# Patient Record
Sex: MALE | Race: WHITE | NOT HISPANIC OR LATINO | URBAN - METROPOLITAN AREA
[De-identification: names, ages, dates, MRNs, and addresses within clinical notes are randomized per-mention and may not be internally consistent; named-entity substitution may affect disease eponyms.]

---

## 2017-07-14 ENCOUNTER — INPATIENT (INPATIENT)
Facility: HOSPITAL | Age: 82
LOS: 3 days | Discharge: HOME HEALTH | DRG: 208 | End: 2017-07-18
Attending: INTERNAL MEDICINE | Admitting: INTERNAL MEDICINE
Payer: MEDICARE

## 2017-07-14 VITALS
DIASTOLIC BLOOD PRESSURE: 83 MMHG | HEART RATE: 92 BPM | OXYGEN SATURATION: 98 % | RESPIRATION RATE: 24 BRPM | SYSTOLIC BLOOD PRESSURE: 153 MMHG

## 2017-07-14 DIAGNOSIS — Z93.1 GASTROSTOMY STATUS: Chronic | ICD-10-CM

## 2017-07-14 DIAGNOSIS — Z98.89 OTHER SPECIFIED POSTPROCEDURAL STATES: Chronic | ICD-10-CM

## 2017-07-14 LAB — RAPID RVP RESULT: SIGNIFICANT CHANGE UP

## 2017-07-14 PROCEDURE — 99291 CRITICAL CARE FIRST HOUR: CPT | Mod: 25

## 2017-07-14 PROCEDURE — 71010: CPT | Mod: 26

## 2017-07-14 PROCEDURE — 93010 ELECTROCARDIOGRAM REPORT: CPT | Mod: 59

## 2017-07-14 PROCEDURE — 31500 INSERT EMERGENCY AIRWAY: CPT

## 2017-07-14 RX ORDER — SODIUM CHLORIDE 9 MG/ML
1000 INJECTION, SOLUTION INTRAVENOUS
Qty: 0 | Refills: 0 | Status: DISCONTINUED | OUTPATIENT
Start: 2017-07-14 | End: 2017-07-18

## 2017-07-14 RX ORDER — SUCCINYLCHOLINE CHLORIDE 100 MG/5ML
100 SYRINGE (ML) INTRAVENOUS ONCE
Qty: 0 | Refills: 0 | Status: COMPLETED | OUTPATIENT
Start: 2017-07-14 | End: 2017-07-14

## 2017-07-14 RX ORDER — IPRATROPIUM/ALBUTEROL SULFATE 18-103MCG
3 AEROSOL WITH ADAPTER (GRAM) INHALATION EVERY 6 HOURS
Qty: 0 | Refills: 0 | Status: DISCONTINUED | OUTPATIENT
Start: 2017-07-14 | End: 2017-07-18

## 2017-07-14 RX ORDER — SACCHAROMYCES BOULARDII 250 MG
1 POWDER IN PACKET (EA) ORAL
Qty: 0 | Refills: 0 | COMMUNITY

## 2017-07-14 RX ORDER — LEVETIRACETAM 250 MG/1
250 TABLET, FILM COATED ORAL
Qty: 0 | Refills: 0 | Status: DISCONTINUED | OUTPATIENT
Start: 2017-07-14 | End: 2017-07-17

## 2017-07-14 RX ORDER — INSULIN LISPRO 100/ML
VIAL (ML) SUBCUTANEOUS
Qty: 0 | Refills: 0 | Status: DISCONTINUED | OUTPATIENT
Start: 2017-07-14 | End: 2017-07-18

## 2017-07-14 RX ORDER — CEFTRIAXONE 500 MG/1
INJECTION, POWDER, FOR SOLUTION INTRAMUSCULAR; INTRAVENOUS
Qty: 0 | Refills: 0 | Status: DISCONTINUED | OUTPATIENT
Start: 2017-07-14 | End: 2017-07-15

## 2017-07-14 RX ORDER — HEPARIN SODIUM 5000 [USP'U]/ML
5000 INJECTION INTRAVENOUS; SUBCUTANEOUS EVERY 8 HOURS
Qty: 0 | Refills: 0 | Status: DISCONTINUED | OUTPATIENT
Start: 2017-07-14 | End: 2017-07-18

## 2017-07-14 RX ORDER — SODIUM CHLORIDE 9 MG/ML
1000 INJECTION INTRAMUSCULAR; INTRAVENOUS; SUBCUTANEOUS ONCE
Qty: 0 | Refills: 0 | Status: COMPLETED | OUTPATIENT
Start: 2017-07-14 | End: 2017-07-14

## 2017-07-14 RX ORDER — POTASSIUM CHLORIDE 20 MEQ
10 PACKET (EA) ORAL
Qty: 0 | Refills: 0 | Status: COMPLETED | OUTPATIENT
Start: 2017-07-14 | End: 2017-07-15

## 2017-07-14 RX ORDER — HALOPERIDOL DECANOATE 100 MG/ML
2 INJECTION INTRAMUSCULAR
Qty: 0 | Refills: 0 | Status: DISCONTINUED | OUTPATIENT
Start: 2017-07-14 | End: 2017-07-18

## 2017-07-14 RX ORDER — AZITHROMYCIN 500 MG/1
250 TABLET, FILM COATED ORAL DAILY
Qty: 0 | Refills: 0 | Status: DISCONTINUED | OUTPATIENT
Start: 2017-07-15 | End: 2017-07-15

## 2017-07-14 RX ORDER — DEXTROSE 50 % IN WATER 50 %
1 SYRINGE (ML) INTRAVENOUS ONCE
Qty: 0 | Refills: 0 | Status: DISCONTINUED | OUTPATIENT
Start: 2017-07-14 | End: 2017-07-18

## 2017-07-14 RX ORDER — PROPOFOL 10 MG/ML
10 INJECTION, EMULSION INTRAVENOUS
Qty: 1000 | Refills: 0 | Status: DISCONTINUED | OUTPATIENT
Start: 2017-07-14 | End: 2017-07-15

## 2017-07-14 RX ORDER — LEVALBUTEROL 1.25 MG/.5ML
3 SOLUTION, CONCENTRATE RESPIRATORY (INHALATION)
Qty: 0 | Refills: 0 | COMMUNITY

## 2017-07-14 RX ORDER — LEVETIRACETAM 250 MG/1
1 TABLET, FILM COATED ORAL
Qty: 0 | Refills: 0 | COMMUNITY

## 2017-07-14 RX ORDER — GLUCAGON INJECTION, SOLUTION 0.5 MG/.1ML
1 INJECTION, SOLUTION SUBCUTANEOUS ONCE
Qty: 0 | Refills: 0 | Status: DISCONTINUED | OUTPATIENT
Start: 2017-07-14 | End: 2017-07-18

## 2017-07-14 RX ORDER — PANTOPRAZOLE SODIUM 20 MG/1
40 TABLET, DELAYED RELEASE ORAL EVERY 24 HOURS
Qty: 0 | Refills: 0 | Status: DISCONTINUED | OUTPATIENT
Start: 2017-07-14 | End: 2017-07-17

## 2017-07-14 RX ORDER — FUROSEMIDE 40 MG
20 TABLET ORAL
Qty: 0 | Refills: 0 | COMMUNITY

## 2017-07-14 RX ORDER — ETOMIDATE 2 MG/ML
20 INJECTION INTRAVENOUS ONCE
Qty: 0 | Refills: 0 | Status: COMPLETED | OUTPATIENT
Start: 2017-07-14 | End: 2017-07-14

## 2017-07-14 RX ORDER — QUETIAPINE FUMARATE 200 MG/1
50 TABLET, FILM COATED ORAL
Qty: 0 | Refills: 0 | Status: DISCONTINUED | OUTPATIENT
Start: 2017-07-14 | End: 2017-07-15

## 2017-07-14 RX ORDER — DEXTROSE 50 % IN WATER 50 %
12.5 SYRINGE (ML) INTRAVENOUS ONCE
Qty: 0 | Refills: 0 | Status: DISCONTINUED | OUTPATIENT
Start: 2017-07-14 | End: 2017-07-18

## 2017-07-14 RX ORDER — FUROSEMIDE 40 MG
20 TABLET ORAL
Qty: 0 | Refills: 0 | Status: DISCONTINUED | OUTPATIENT
Start: 2017-07-14 | End: 2017-07-15

## 2017-07-14 RX ORDER — SODIUM CHLORIDE 9 MG/ML
1000 INJECTION INTRAMUSCULAR; INTRAVENOUS; SUBCUTANEOUS
Qty: 0 | Refills: 0 | Status: DISCONTINUED | OUTPATIENT
Start: 2017-07-14 | End: 2017-07-17

## 2017-07-14 RX ORDER — HALOPERIDOL DECANOATE 100 MG/ML
1 INJECTION INTRAMUSCULAR
Qty: 0 | Refills: 0 | COMMUNITY

## 2017-07-14 RX ORDER — CEFTRIAXONE 500 MG/1
1 INJECTION, POWDER, FOR SOLUTION INTRAMUSCULAR; INTRAVENOUS ONCE
Qty: 0 | Refills: 0 | Status: COMPLETED | OUTPATIENT
Start: 2017-07-14 | End: 2017-07-14

## 2017-07-14 RX ORDER — CEFTRIAXONE 500 MG/1
1 INJECTION, POWDER, FOR SOLUTION INTRAMUSCULAR; INTRAVENOUS EVERY 24 HOURS
Qty: 0 | Refills: 0 | Status: DISCONTINUED | OUTPATIENT
Start: 2017-07-15 | End: 2017-07-15

## 2017-07-14 RX ORDER — POTASSIUM CHLORIDE 20 MEQ
40 PACKET (EA) ORAL ONCE
Qty: 0 | Refills: 0 | Status: COMPLETED | OUTPATIENT
Start: 2017-07-14 | End: 2017-07-14

## 2017-07-14 RX ORDER — OXCARBAZEPINE 300 MG/1
1 TABLET, FILM COATED ORAL
Qty: 0 | Refills: 0 | COMMUNITY

## 2017-07-14 RX ORDER — IPRATROPIUM BROMIDE 0.2 MG/ML
2.5 SOLUTION, NON-ORAL INHALATION
Qty: 0 | Refills: 0 | COMMUNITY

## 2017-07-14 RX ORDER — GABAPENTIN 400 MG/1
300 CAPSULE ORAL
Qty: 0 | Refills: 0 | COMMUNITY

## 2017-07-14 RX ORDER — AZITHROMYCIN 500 MG/1
500 TABLET, FILM COATED ORAL ONCE
Qty: 0 | Refills: 0 | Status: COMPLETED | OUTPATIENT
Start: 2017-07-14 | End: 2017-07-14

## 2017-07-14 RX ORDER — BUDESONIDE, MICRONIZED 100 %
2 POWDER (GRAM) MISCELLANEOUS
Qty: 0 | Refills: 0 | COMMUNITY

## 2017-07-14 RX ORDER — OXCARBAZEPINE 300 MG/1
300 TABLET, FILM COATED ORAL DAILY
Qty: 0 | Refills: 0 | Status: DISCONTINUED | OUTPATIENT
Start: 2017-07-14 | End: 2017-07-18

## 2017-07-14 RX ORDER — BUDESONIDE, MICRONIZED 100 %
1 POWDER (GRAM) MISCELLANEOUS
Qty: 0 | Refills: 0 | Status: DISCONTINUED | OUTPATIENT
Start: 2017-07-14 | End: 2017-07-15

## 2017-07-14 RX ORDER — OXCARBAZEPINE 300 MG/1
450 TABLET, FILM COATED ORAL AT BEDTIME
Qty: 0 | Refills: 0 | Status: DISCONTINUED | OUTPATIENT
Start: 2017-07-14 | End: 2017-07-18

## 2017-07-14 RX ORDER — GABAPENTIN 400 MG/1
300 CAPSULE ORAL
Qty: 0 | Refills: 0 | Status: DISCONTINUED | OUTPATIENT
Start: 2017-07-14 | End: 2017-07-18

## 2017-07-14 RX ADMIN — SODIUM CHLORIDE 500 MILLILITER(S): 9 INJECTION INTRAMUSCULAR; INTRAVENOUS; SUBCUTANEOUS at 21:07

## 2017-07-14 RX ADMIN — ETOMIDATE 20 MILLIGRAM(S): 2 INJECTION INTRAVENOUS at 19:53

## 2017-07-14 RX ADMIN — PROPOFOL 5.15 MICROGRAM(S)/KG/MIN: 10 INJECTION, EMULSION INTRAVENOUS at 21:23

## 2017-07-14 RX ADMIN — Medication 100 MILLIGRAM(S): at 19:54

## 2017-07-14 NOTE — ED ADULT NURSE REASSESSMENT NOTE - NS ED NURSE REASSESS COMMENT FT1
Pt intubated 1955, 7.5 23 at lip.  Color verified by MD Bettencourt.
20mg etomidate 1953  100mg Succs 1954
Per MD Bettencourt, propofol dose increased to 10mg/kg.  40mg bolus given by MD Bettencourt.  Pt is now calm, resting comfortably.
Propofol 7.5mg /kg IV started at 2000.
Skin integrity intact. 24g IV in right hand prior to arrival to ED.
Vent settings (7.0 ET tube, 22 at the lips): Tidal volume 500, FIO2 80%, PEEP 5, Rate 12. Patient also presents with serna catheter in place (unable to determine Albanian size) and PEG tube. Was given on field by EMS: lasix, nitroglycerin, amikacin, rocephin, solumedrol, and vancomycin.

## 2017-07-14 NOTE — ED PROVIDER NOTE - PHYSICAL EXAMINATION
CON: obtunded, responsive to painful stimuli, HENMT: respiratory distress, clear oropharynx, no vomitus, HEAD: atraumatic, CV: rrr, RESP: dec BS throughout, labored breathing, respiratory distress noted, GI: soft abd, +peg tube noted, no distension, SKIN: no rash, MSK: no edema, moving all extremities spontaneously, NEURO: ams, limited neuro exam

## 2017-07-14 NOTE — ED ADULT NURSE NOTE - PMH
Atherosclerosis of coronary artery  CAD (coronary artery disease)  Cerebral artery occlusion with cerebral infarction  CVA (cerebral vascular accident)  Essential hypertension  HTN (hypertension)  Hyperlipidemia  HLD (hyperlipidemia)  Iron deficiency anemia  Iron deficiency anemia  Legally blind    Prostate hyperplasia with urinary obstruction    Seizure disorder Atherosclerosis of coronary artery  CAD (coronary artery disease)  Cardiomegaly    Cerebral artery occlusion with cerebral infarction  CVA (cerebral vascular accident)  Essential hypertension  HTN (hypertension)  Hyperlipidemia  HLD (hyperlipidemia)  Iron deficiency anemia  Iron deficiency anemia  Legally blind    Prostate hyperplasia with urinary obstruction    Seizure disorder

## 2017-07-14 NOTE — CONSULT NOTE ADULT - SUBJECTIVE AND OBJECTIVE BOX
HPI:     89 year old bedbound M w/ PMH of Alzheimer's dementia, HTN, CAD, CVA w/ left sided deficits, GI bleed, aspiration pneumonitis, epilepsy, w/ PEG, presenting to [facility] ED with respiratory distress and altered mental status. At baseline, patient is AAOx3 and prays everyday. Per family (Stephane Turpin), patient was not feeling well for the last few days with increasing respiratory distress and shortness of breath. He was suctioned at home with increased sputum production. They called their PMD who ordered a chest x-ray which showed bilateral infiltrates. Patient did not desaturate or have fevers. At home, peripheral IV lines were placed and his PMD ordered 1g vancomycin, 2 g ceftriaxone, and 250 mg of amikacin. He was also given solumedrol 125 mg and lasix 80 mg. Upon presentation to the ED, T 97.9, HR 92, /83, RR 24, Sat 98%. Patient was found to have increasing respiratory distress, was not able to protect his airway, had stridor and was intubated for airway protection. Per family, patient has an acute respiratory distress episode every 2 weeks, presumed to be stridor from deviated trachea (per family), and is given atropine and solumedrol with relief of symptoms. In the ED, he was given a 1L bolus and started on propofol.     PMH:   CAD  HTN  Dementia  GI bleed  Blind  Anemia  BPH  Epilepsy    Allergies:  NKDA    Meds:   Trileptal  Haldol  Duoneb  Prednisone  Keppra  Buspar  Gabapentin  Lasix  Seroquel    VITAL SIGNS:  Vital Signs Last 24 Hrs  T(C): 36.8 (14 Jul 2017 22:00), Max: 36.8 (14 Jul 2017 22:00)  T(F): 98.2 (14 Jul 2017 22:00), Max: 98.2 (14 Jul 2017 22:00)  HR: 80 (14 Jul 2017 23:00) (80 - 92)  BP: 134/76 (14 Jul 2017 23:00) (104/76 - 169/94)  BP(mean): 100 (14 Jul 2017 22:00) (100 - 100)  RR: 11 (14 Jul 2017 23:00) (11 - 24)  SpO2: 99% (14 Jul 2017 23:00) (98% - 100%)    PHYSICAL EXAM:    General: intubated, sedated, R hand tremor  HEENT: R eye cloudy, NC/AT  Cardiovascular: +S1/S2; RRR,   Respiratory: CTA b/l; no W/R/R, no stridor appreciated  Gastrointestinal: +PEG, distended,   Extremities: WWP; no edema, clubbing or cyanosis  Neurological: unable to assess    MEDICATIONS:  MEDICATIONS  (STANDING):  propofol Infusion 10 MICROgram(s)/kG/Min (5.148 mL/Hr) IV Continuous <Continuous>  heparin  Injectable 5000 Unit(s) SubCutaneous every 8 hours  insulin lispro (HumaLOG) corrective regimen sliding scale   SubCutaneous Before meals and at bedtime  dextrose 5%. 1000 milliLiter(s) (50 mL/Hr) IV Continuous <Continuous>  dextrose 50% Injectable 12.5 Gram(s) IV Push once  potassium chloride    Tablet ER 40 milliEquivalent(s) Oral once  potassium chloride  10 mEq/100 mL IVPB 10 milliEquivalent(s) IV Intermittent every 1 hour  cefTRIAXone   IVPB   IV Intermittent   azithromycin   Tablet 500 milliGRAM(s) Oral once  sodium chloride 0.9%. 1000 milliLiter(s) (110 mL/Hr) IV Continuous <Continuous>  cefTRIAXone   IVPB 1 Gram(s) IV Intermittent once  OXcarbazepine 300 milliGRAM(s) Oral two times a day  OXcarbazepine 150 milliGRAM(s) Oral at bedtime  gabapentin 300 milliGRAM(s) Oral two times a day  levETIRAcetam 250 milliGRAM(s) Oral two times a day  QUEtiapine 50 milliGRAM(s) Oral two times a day  buDESOnide  180 MICROgram(s) Inhaler 1 Puff(s) Inhalation two times a day  furosemide    Tablet 20 milliGRAM(s) Oral two times a day  pantoprazole  Injectable 40 milliGRAM(s) IV Push every 12 hours    MEDICATIONS  (PRN):  dextrose Gel 1 Dose(s) Oral once PRN Blood Glucose LESS THAN 70 milliGRAM(s)/deciliter  glucagon  Injectable 1 milliGRAM(s) IntraMuscular once PRN Glucose LESS THAN 70 milligrams/deciliter  haloperidol     Tablet 2 milliGRAM(s) Oral two times a day PRN agitation  ALBUTerol/ipratropium for Nebulization 3 milliLiter(s) Nebulizer every 6 hours PRN Shortness of Breath and/or Wheezing      ALLERGIES:  Allergies    No Known Allergies    Intolerances        LABS:                        12.0   5.4   )-----------( 168      ( 14 Jul 2017 19:59 )             36.1     07-14    134<L>  |  90<L>  |  40<H>  ----------------------------<  207<H>  3.3<L>   |  27  |  0.60    Ca    8.7      14 Jul 2017 19:59    TPro  7.6  /  Alb  3.7  /  TBili  0.2  /  DBili  x   /  AST  33  /  ALT  37  /  AlkPhos  125<H>  07-14    PT/INR - ( 14 Jul 2017 20:14 )   PT: 10.6 sec;   INR: 0.96          PTT - ( 14 Jul 2017 20:14 )  PTT:27.9 sec    CAPILLARY BLOOD GLUCOSE          RADIOLOGY & ADDITIONAL TESTS: Reviewed.    ASSESSMENT:    PLAN: HPI:     89 year old bedbound M w/ PMH of Alzheimer's dementia, HTN, CAD, CVA w/ left sided deficits, GI bleed, aspiration pneumonitis, epilepsy, w/ PEG, presenting to [facility] ED with respiratory distress and altered mental status. At baseline, patient is AAOx3 and prays everyday. Per family (Stephane Turpin), patient was not feeling well for the last few days with increasing respiratory distress and shortness of breath. He was suctioned at home with increased sputum production. They called their PMD who ordered a chest x-ray which showed bilateral infiltrates. Patient did not desaturate or have fevers. At home, peripheral IV lines were placed and his PMD ordered 1g vancomycin, 2 g ceftriaxone, and 250 mg of amikacin. He was also given solumedrol 125 mg and lasix 80 mg. Upon presentation to the ED, T 97.9, HR 92, /83, RR 24, Sat 98%. Patient was found to have increasing respiratory distress, was not able to protect his airway, had stridor and was intubated for airway protection. Per family, patient has an acute respiratory distress episode every 2 weeks, presumed to be stridor from deviated trachea (per family), and is given atropine and solumedrol with relief of symptoms. In the ED, he was given a 1L bolus and started on propofol.     PMH:   CAD  HTN  Dementia  GI bleed  Blind  Anemia  BPH  Epilepsy    Allergies:  NKDA    Meds:   Trileptal  Haldol  Duoneb  Prednisone  Keppra  Buspar  Gabapentin  Lasix  Seroquel    VITAL SIGNS:  Vital Signs Last 24 Hrs  T(C): 36.8 (14 Jul 2017 22:00), Max: 36.8 (14 Jul 2017 22:00)  T(F): 98.2 (14 Jul 2017 22:00), Max: 98.2 (14 Jul 2017 22:00)  HR: 80 (14 Jul 2017 23:00) (80 - 92)  BP: 134/76 (14 Jul 2017 23:00) (104/76 - 169/94)  BP(mean): 100 (14 Jul 2017 22:00) (100 - 100)  RR: 11 (14 Jul 2017 23:00) (11 - 24)  SpO2: 99% (14 Jul 2017 23:00) (98% - 100%)    PHYSICAL EXAM:    General: intubated, sedated, R hand tremor  HEENT: R eye cloudy, NC/AT  Cardiovascular: +S1/S2; RRR,   Respiratory: CTA b/l; no W/R/R, no stridor appreciated  Gastrointestinal: +PEG, distended,   Extremities: WWP; no edema, clubbing or cyanosis  Neurological: unable to assess    MEDICATIONS:  MEDICATIONS  (STANDING):  propofol Infusion 10 MICROgram(s)/kG/Min (5.148 mL/Hr) IV Continuous <Continuous>  heparin  Injectable 5000 Unit(s) SubCutaneous every 8 hours  insulin lispro (HumaLOG) corrective regimen sliding scale   SubCutaneous Before meals and at bedtime  dextrose 5%. 1000 milliLiter(s) (50 mL/Hr) IV Continuous <Continuous>  dextrose 50% Injectable 12.5 Gram(s) IV Push once  potassium chloride    Tablet ER 40 milliEquivalent(s) Oral once  potassium chloride  10 mEq/100 mL IVPB 10 milliEquivalent(s) IV Intermittent every 1 hour  cefTRIAXone   IVPB   IV Intermittent   azithromycin   Tablet 500 milliGRAM(s) Oral once  sodium chloride 0.9%. 1000 milliLiter(s) (110 mL/Hr) IV Continuous <Continuous>  cefTRIAXone   IVPB 1 Gram(s) IV Intermittent once  OXcarbazepine 300 milliGRAM(s) Oral two times a day  OXcarbazepine 150 milliGRAM(s) Oral at bedtime  gabapentin 300 milliGRAM(s) Oral two times a day  levETIRAcetam 250 milliGRAM(s) Oral two times a day  QUEtiapine 50 milliGRAM(s) Oral two times a day  buDESOnide  180 MICROgram(s) Inhaler 1 Puff(s) Inhalation two times a day  furosemide    Tablet 20 milliGRAM(s) Oral two times a day  pantoprazole  Injectable 40 milliGRAM(s) IV Push every 12 hours    MEDICATIONS  (PRN):  dextrose Gel 1 Dose(s) Oral once PRN Blood Glucose LESS THAN 70 milliGRAM(s)/deciliter  glucagon  Injectable 1 milliGRAM(s) IntraMuscular once PRN Glucose LESS THAN 70 milligrams/deciliter  haloperidol     Tablet 2 milliGRAM(s) Oral two times a day PRN agitation  ALBUTerol/ipratropium for Nebulization 3 milliLiter(s) Nebulizer every 6 hours PRN Shortness of Breath and/or Wheezing      ALLERGIES:  Allergies    No Known Allergies    Intolerances        LABS:                        12.0   5.4   )-----------( 168      ( 14 Jul 2017 19:59 )             36.1     07-14    134<L>  |  90<L>  |  40<H>  ----------------------------<  207<H>  3.3<L>   |  27  |  0.60    Ca    8.7      14 Jul 2017 19:59    TPro  7.6  /  Alb  3.7  /  TBili  0.2  /  DBili  x   /  AST  33  /  ALT  37  /  AlkPhos  125<H>  07-14    PT/INR - ( 14 Jul 2017 20:14 )   PT: 10.6 sec;   INR: 0.96          PTT - ( 14 Jul 2017 20:14 )  PTT:27.9 sec    CAPILLARY BLOOD GLUCOSE          RADIOLOGY & ADDITIONAL TESTS: Reviewed.    ASSESSMENT: 89 year old bedbound M w/ PMH of Alzheimer's dementia, HTN, CAD, CVA w/ left sided deficits, GI bleed, aspiration pneumonitis, epilepsy, w/ PEG, presenting to [facility] ED with respiratory distress and altered mental status.     #Neuro: currently sedated  -on high doses of AED and anti-psychotics at home: will continue at normal doses  -c/w Keppra, Trileptal, Seroquel and haldol prn  -c/w propofol for sedation    #Pulm: currently intubated and ventilated  -etiology of acute on chronic respiratory failure likely multifactorial: tracheitis vs CAP   -s/p vanc, ceftriaxone, and amikacin at home, no clear infiltrates on chest x-ray, c/w Ceftriaxone and Azithromycin for CAP  -f/u ABG as patient on ventilator  -s/p 125 mg solumedrol at home, will c/w solumedrol 40 mg q12h for tracheitis or airway edema (on prednisone 20 mg at home)    #ID: not septic, was tachypneic and tachycardic however no clear source  -f/u sputum culture, c/w ceftriaxone and azithromycin as above  -f/u blood culture  -lactate 2.2, will not continue to trend    #Cardiology  -with positive troponins in ED at 0.05, likely demand, will get official EKG, trend troponin  -f/u qTC as he is on many AED  -HTN: hold anti-HTN, not requiring pressors    #Endo  -SSI    #FEN  -HSQ for DVT prophylaxis    FULL CODE    Admit to MICU HPI:     89 year old bedbound M w/ PMH of Alzheimer's dementia, HTN, CAD, CVA w/ left sided deficits, GI bleed, aspiration pneumonitis, epilepsy, w/ PEG, presenting to [facility] ED with respiratory distress and altered mental status. At baseline, patient is AAOx3 and prays everyday. Per family (Stephane Turpin), patient was not feeling well for the last few days with increasing respiratory distress and shortness of breath. He was suctioned at home with increased sputum production. They called their PMD who ordered a chest x-ray which showed bilateral infiltrates. Patient did not desaturate or have fevers. At home, peripheral IV lines were placed and his PMD ordered 1g vancomycin, 2 g ceftriaxone, and 250 mg of amikacin. He was also given solumedrol 125 mg and lasix 80 mg. Upon presentation to the ED, T 97.9, HR 92, /83, RR 24, Sat 98%. Patient was found to have increasing respiratory distress, was not able to protect his airway, had stridor and was intubated for airway protection. Per family, patient has an acute respiratory distress episode every 2 weeks, presumed to be stridor from deviated trachea (per family), and is given atropine and solumedrol with relief of symptoms. In the ED, he was given a 1L bolus and started on propofol.     PMH:   CAD  HTN  Dementia  GI bleed  Blind  Anemia  BPH  Epilepsy    Allergies:  NKDA    Meds:   Trileptal  Haldol  Duoneb  Prednisone  Keppra  Buspar  Gabapentin  Lasix  Seroquel    VITAL SIGNS:  Vital Signs Last 24 Hrs  T(C): 36.8 (14 Jul 2017 22:00), Max: 36.8 (14 Jul 2017 22:00)  T(F): 98.2 (14 Jul 2017 22:00), Max: 98.2 (14 Jul 2017 22:00)  HR: 80 (14 Jul 2017 23:00) (80 - 92)  BP: 134/76 (14 Jul 2017 23:00) (104/76 - 169/94)  BP(mean): 100 (14 Jul 2017 22:00) (100 - 100)  RR: 11 (14 Jul 2017 23:00) (11 - 24)  SpO2: 99% (14 Jul 2017 23:00) (98% - 100%)    PHYSICAL EXAM:    General: intubated, sedated, R hand tremor  HEENT: R eye cloudy, NC/AT  Cardiovascular: +S1/S2; RRR,   Respiratory: CTA b/l; no W/R/R, no stridor appreciated  Gastrointestinal: +PEG, distended,   Extremities: WWP; no edema, clubbing or cyanosis  Neurological: unable to assess    MEDICATIONS:  MEDICATIONS  (STANDING):  propofol Infusion 10 MICROgram(s)/kG/Min (5.148 mL/Hr) IV Continuous <Continuous>  heparin  Injectable 5000 Unit(s) SubCutaneous every 8 hours  insulin lispro (HumaLOG) corrective regimen sliding scale   SubCutaneous Before meals and at bedtime  dextrose 5%. 1000 milliLiter(s) (50 mL/Hr) IV Continuous <Continuous>  dextrose 50% Injectable 12.5 Gram(s) IV Push once  potassium chloride    Tablet ER 40 milliEquivalent(s) Oral once  potassium chloride  10 mEq/100 mL IVPB 10 milliEquivalent(s) IV Intermittent every 1 hour  cefTRIAXone   IVPB   IV Intermittent   azithromycin   Tablet 500 milliGRAM(s) Oral once  sodium chloride 0.9%. 1000 milliLiter(s) (110 mL/Hr) IV Continuous <Continuous>  cefTRIAXone   IVPB 1 Gram(s) IV Intermittent once  OXcarbazepine 300 milliGRAM(s) Oral two times a day  OXcarbazepine 150 milliGRAM(s) Oral at bedtime  gabapentin 300 milliGRAM(s) Oral two times a day  levETIRAcetam 250 milliGRAM(s) Oral two times a day  QUEtiapine 50 milliGRAM(s) Oral two times a day  buDESOnide  180 MICROgram(s) Inhaler 1 Puff(s) Inhalation two times a day  furosemide    Tablet 20 milliGRAM(s) Oral two times a day  pantoprazole  Injectable 40 milliGRAM(s) IV Push every 12 hours    MEDICATIONS  (PRN):  dextrose Gel 1 Dose(s) Oral once PRN Blood Glucose LESS THAN 70 milliGRAM(s)/deciliter  glucagon  Injectable 1 milliGRAM(s) IntraMuscular once PRN Glucose LESS THAN 70 milligrams/deciliter  haloperidol     Tablet 2 milliGRAM(s) Oral two times a day PRN agitation  ALBUTerol/ipratropium for Nebulization 3 milliLiter(s) Nebulizer every 6 hours PRN Shortness of Breath and/or Wheezing      ALLERGIES:  Allergies    No Known Allergies    Intolerances        LABS:                        12.0   5.4   )-----------( 168      ( 14 Jul 2017 19:59 )             36.1     07-14    134<L>  |  90<L>  |  40<H>  ----------------------------<  207<H>  3.3<L>   |  27  |  0.60    Ca    8.7      14 Jul 2017 19:59    TPro  7.6  /  Alb  3.7  /  TBili  0.2  /  DBili  x   /  AST  33  /  ALT  37  /  AlkPhos  125<H>  07-14    PT/INR - ( 14 Jul 2017 20:14 )   PT: 10.6 sec;   INR: 0.96          PTT - ( 14 Jul 2017 20:14 )  PTT:27.9 sec    CAPILLARY BLOOD GLUCOSE          RADIOLOGY & ADDITIONAL TESTS: Reviewed.    ASSESSMENT: 89 year old bedbound M w/ PMH of Alzheimer's dementia, HTN, CAD, CVA w/ left sided deficits, GI bleed, aspiration pneumonitis, epilepsy, w/ PEG, presenting to [facility] ED with respiratory distress and altered mental status.     #Neuro: currently sedated  -on high doses of AED and anti-psychotics at home: will continue at normal doses  -c/w Keppra, Trileptal, Seroquel and haldol prn  -c/w propofol for sedation    #Pulm: currently intubated and ventilated  -etiology of acute on chronic respiratory failure likely multifactorial: tracheitis vs CAP   -s/p vanc, ceftriaxone, and amikacin at home, no clear infiltrates on chest x-ray, c/w Ceftriaxone and Azithromycin for CAP  -f/u ABG as patient on ventilator  -s/p 125 mg solumedrol at home, will c/w solumedrol 40 mg q12h for tracheitis or airway edema (on prednisone 20 mg at home)    #ID: not septic, was tachypneic and tachycardic however no clear source  -f/u sputum culture, c/w ceftriaxone and azithromycin as above  -f/u blood culture  -lactate 2.2, will not continue to trend    #Cardiology  -with positive troponins in ED at 0.05, likely demand, will get official EKG, trend troponin  -f/u qTC as he is on many AED  -HTN: hold anti-HTN, not requiring pressors    #Endo  -SSI    #Heme  -anemia: Hgb 12, higher than in past, likely concentrated, hx of MEGHA, will continue to trend  -maintain active type and screen    #FEN  -HSQ for DVT prophylaxis    FULL CODE    Admit to MICU

## 2017-07-14 NOTE — ED ADULT NURSE NOTE - OBJECTIVE STATEMENT
Pt BIBA for SOB, Per EMS current SOB started approx 4 hours ago. Per EMS pt received. 1 gm of Vancomycin, 125mg solumedrol, 80mg Lasix IV and 2mg rocephin prior to EMS arrival.  Pt currently has stridor, and using accessory muscles to breath.  Pt unable to speak in complete, clear sentences. NRB by EMS.

## 2017-07-14 NOTE — ED PROVIDER NOTE - MEDICAL DECISION MAKING DETAILS
respiratory distress, full code (verified w/ family), on NRB upon arrival, concern for potentially rapid deterioration of airway, will need emergent intubation, pt rec'd abx PTA w/ steroids and lasix, will obtain sepsis labs, xray chest, UA, MICU evaluation

## 2017-07-14 NOTE — ED PROVIDER NOTE - DIAGNOSTIC INTERPRETATION
ER Physician: Sebastien Bettencourt  CHEST XRAY INTERPRETATION: RUL opacity noted, heart shadow normal, bony structures intact

## 2017-07-14 NOTE — ED PROVIDER NOTE - PMH
Atherosclerosis of coronary artery  CAD (coronary artery disease)  Cerebral artery occlusion with cerebral infarction  CVA (cerebral vascular accident)  Essential hypertension  HTN (hypertension)  Hyperlipidemia  HLD (hyperlipidemia)  Iron deficiency anemia  Iron deficiency anemia  Legally blind    Prostate hyperplasia with urinary obstruction    Seizure disorder

## 2017-07-14 NOTE — ED ADULT TRIAGE NOTE - CHIEF COMPLAINT QUOTE
pt in resp distress. as per medics pt has confirmed bilateral lobe pneumonia. pt given Solumedrol 125mg, Lasix 80mg, Vanco 1gm, Rocephin 2gm.

## 2017-07-14 NOTE — ED PROVIDER NOTE - OBJECTIVE STATEMENT
89 yom bibems for sob and respiratory distress.  hx of HTN, CAD w/ stents, sz, hx of CVA w/ L deficit, PEG tube, serna for urinary retention, per family, baseline he's engaging and ao x 3, yesterday w/ cough, today 6-7hr ago, sx worsened.  per EMS, pt had xray done at home, w/ b/l PNA, rec'd ceftriaxone, vanco, and amikacin PTA, also 80mg steroids, and 20mg lasix via PMD. 89 yom bibems for sob and respiratory distress.  hx of HTN, CAD w/ stents, sz, hx of CVA w/ L deficit, PEG tube, serna for urinary retention, per family, baseline he's engaging and ao x 3, yesterday w/ cough, today 6-7hr ago, sx worsened.  per EMS, pt had xray done at home, w/ b/l PNA, rec'd ceftriaxone, vanco, and amikacin PTA, also 125mg steroids, and 80mg lasix via PMD.

## 2017-07-15 LAB
ANION GAP SERPL CALC-SCNC: 15 MMOL/L — SIGNIFICANT CHANGE UP (ref 5–17)
APTT BLD: 24.2 SEC — LOW (ref 27.5–37.4)
BASE EXCESS BLDA CALC-SCNC: 7.1 MMOL/L — HIGH (ref -2–3)
BUN SERPL-MCNC: 33 MG/DL — HIGH (ref 7–23)
CALCIUM SERPL-MCNC: 7.7 MG/DL — LOW (ref 8.4–10.5)
CHLORIDE SERPL-SCNC: 97 MMOL/L — SIGNIFICANT CHANGE UP (ref 96–108)
CO2 SERPL-SCNC: 26 MMOL/L — SIGNIFICANT CHANGE UP (ref 22–31)
CREAT SERPL-MCNC: 0.6 MG/DL — SIGNIFICANT CHANGE UP (ref 0.5–1.3)
CULTURE RESULTS: SIGNIFICANT CHANGE UP
GAS PNL BLDA: SIGNIFICANT CHANGE UP
GLUCOSE SERPL-MCNC: 150 MG/DL — HIGH (ref 70–99)
GRAM STN FLD: SIGNIFICANT CHANGE UP
HCO3 BLDA-SCNC: 32 MMOL/L — HIGH (ref 21–28)
HCT VFR BLD CALC: 27.9 % — LOW (ref 39–50)
HGB BLD-MCNC: 9.2 G/DL — LOW (ref 13–17)
INR BLD: 1.09 — SIGNIFICANT CHANGE UP (ref 0.88–1.16)
MAGNESIUM SERPL-MCNC: 2.2 MG/DL — SIGNIFICANT CHANGE UP (ref 1.6–2.6)
MCHC RBC-ENTMCNC: 29.9 PG — SIGNIFICANT CHANGE UP (ref 27–34)
MCHC RBC-ENTMCNC: 33 G/DL — SIGNIFICANT CHANGE UP (ref 32–36)
MCV RBC AUTO: 90.6 FL — SIGNIFICANT CHANGE UP (ref 80–100)
PCO2 BLDA: 45 MMHG — SIGNIFICANT CHANGE UP (ref 35–48)
PH BLDA: 7.46 — HIGH (ref 7.35–7.45)
PHOSPHATE SERPL-MCNC: 2.1 MG/DL — LOW (ref 2.5–4.5)
PLATELET # BLD AUTO: 141 K/UL — LOW (ref 150–400)
PO2 BLDA: 175 MMHG — HIGH (ref 83–108)
POTASSIUM SERPL-MCNC: 3.1 MMOL/L — LOW (ref 3.5–5.3)
POTASSIUM SERPL-SCNC: 3.1 MMOL/L — LOW (ref 3.5–5.3)
PROTHROM AB SERPL-ACNC: 12.1 SEC — SIGNIFICANT CHANGE UP (ref 9.8–12.7)
RBC # BLD: 3.08 M/UL — LOW (ref 4.2–5.8)
RBC # FLD: 14.4 % — SIGNIFICANT CHANGE UP (ref 10.3–16.9)
SAO2 % BLDA: 99 % — SIGNIFICANT CHANGE UP (ref 95–100)
SODIUM SERPL-SCNC: 138 MMOL/L — SIGNIFICANT CHANGE UP (ref 135–145)
SPECIMEN SOURCE: SIGNIFICANT CHANGE UP
TROPONIN T SERPL-MCNC: 0.04 NG/ML — HIGH (ref 0–0.01)
WBC # BLD: 5.6 K/UL — SIGNIFICANT CHANGE UP (ref 3.8–10.5)
WBC # FLD AUTO: 5.6 K/UL — SIGNIFICANT CHANGE UP (ref 3.8–10.5)

## 2017-07-15 PROCEDURE — 99233 SBSQ HOSP IP/OBS HIGH 50: CPT | Mod: GC

## 2017-07-15 PROCEDURE — 71010: CPT | Mod: 26

## 2017-07-15 PROCEDURE — 93010 ELECTROCARDIOGRAM REPORT: CPT

## 2017-07-15 PROCEDURE — 74000: CPT | Mod: 26

## 2017-07-15 RX ORDER — POTASSIUM PHOSPHATE, MONOBASIC POTASSIUM PHOSPHATE, DIBASIC 236; 224 MG/ML; MG/ML
15 INJECTION, SOLUTION INTRAVENOUS ONCE
Qty: 0 | Refills: 0 | Status: COMPLETED | OUTPATIENT
Start: 2017-07-15 | End: 2017-07-15

## 2017-07-15 RX ORDER — QUETIAPINE FUMARATE 200 MG/1
150 TABLET, FILM COATED ORAL THREE TIMES A DAY
Qty: 0 | Refills: 0 | Status: DISCONTINUED | OUTPATIENT
Start: 2017-07-15 | End: 2017-07-15

## 2017-07-15 RX ORDER — SODIUM CHLORIDE 9 MG/ML
1000 INJECTION INTRAMUSCULAR; INTRAVENOUS; SUBCUTANEOUS ONCE
Qty: 0 | Refills: 0 | Status: COMPLETED | OUTPATIENT
Start: 2017-07-15 | End: 2017-07-15

## 2017-07-15 RX ORDER — BUDESONIDE, MICRONIZED 100 %
0.5 POWDER (GRAM) MISCELLANEOUS
Qty: 0 | Refills: 0 | Status: DISCONTINUED | OUTPATIENT
Start: 2017-07-15 | End: 2017-07-18

## 2017-07-15 RX ORDER — QUETIAPINE FUMARATE 200 MG/1
100 TABLET, FILM COATED ORAL ONCE
Qty: 0 | Refills: 0 | Status: COMPLETED | OUTPATIENT
Start: 2017-07-15 | End: 2017-07-15

## 2017-07-15 RX ORDER — QUETIAPINE FUMARATE 200 MG/1
50 TABLET, FILM COATED ORAL
Qty: 0 | Refills: 0 | Status: DISCONTINUED | OUTPATIENT
Start: 2017-07-15 | End: 2017-07-16

## 2017-07-15 RX ORDER — POTASSIUM CHLORIDE 20 MEQ
40 PACKET (EA) ORAL EVERY 4 HOURS
Qty: 0 | Refills: 0 | Status: COMPLETED | OUTPATIENT
Start: 2017-07-15 | End: 2017-07-15

## 2017-07-15 RX ORDER — NOREPINEPHRINE BITARTRATE/D5W 8 MG/250ML
0.01 PLASTIC BAG, INJECTION (ML) INTRAVENOUS
Qty: 8 | Refills: 0 | Status: DISCONTINUED | OUTPATIENT
Start: 2017-07-15 | End: 2017-07-16

## 2017-07-15 RX ORDER — NOREPINEPHRINE BITARTRATE/D5W 8 MG/250ML
0.01 PLASTIC BAG, INJECTION (ML) INTRAVENOUS
Qty: 16 | Refills: 0 | Status: DISCONTINUED | OUTPATIENT
Start: 2017-07-15 | End: 2017-07-15

## 2017-07-15 RX ADMIN — Medication 100 MILLIEQUIVALENT(S): at 01:59

## 2017-07-15 RX ADMIN — PANTOPRAZOLE SODIUM 40 MILLIGRAM(S): 20 TABLET, DELAYED RELEASE ORAL at 22:57

## 2017-07-15 RX ADMIN — Medication 100 MILLIEQUIVALENT(S): at 00:37

## 2017-07-15 RX ADMIN — Medication 40 MILLIGRAM(S): at 21:49

## 2017-07-15 RX ADMIN — POTASSIUM PHOSPHATE, MONOBASIC POTASSIUM PHOSPHATE, DIBASIC 62.5 MILLIMOLE(S): 236; 224 INJECTION, SOLUTION INTRAVENOUS at 06:11

## 2017-07-15 RX ADMIN — QUETIAPINE FUMARATE 150 MILLIGRAM(S): 200 TABLET, FILM COATED ORAL at 17:18

## 2017-07-15 RX ADMIN — HEPARIN SODIUM 5000 UNIT(S): 5000 INJECTION INTRAVENOUS; SUBCUTANEOUS at 21:50

## 2017-07-15 RX ADMIN — OXCARBAZEPINE 300 MILLIGRAM(S): 300 TABLET, FILM COATED ORAL at 11:54

## 2017-07-15 RX ADMIN — Medication 40 MILLIEQUIVALENT(S): at 10:00

## 2017-07-15 RX ADMIN — PROPOFOL 5.15 MICROGRAM(S)/KG/MIN: 10 INJECTION, EMULSION INTRAVENOUS at 00:11

## 2017-07-15 RX ADMIN — GABAPENTIN 300 MILLIGRAM(S): 400 CAPSULE ORAL at 06:10

## 2017-07-15 RX ADMIN — SODIUM CHLORIDE 110 MILLILITER(S): 9 INJECTION INTRAMUSCULAR; INTRAVENOUS; SUBCUTANEOUS at 00:36

## 2017-07-15 RX ADMIN — LEVETIRACETAM 250 MILLIGRAM(S): 250 TABLET, FILM COATED ORAL at 17:17

## 2017-07-15 RX ADMIN — Medication 4: at 00:11

## 2017-07-15 RX ADMIN — QUETIAPINE FUMARATE 50 MILLIGRAM(S): 200 TABLET, FILM COATED ORAL at 06:09

## 2017-07-15 RX ADMIN — HEPARIN SODIUM 5000 UNIT(S): 5000 INJECTION INTRAVENOUS; SUBCUTANEOUS at 14:36

## 2017-07-15 RX ADMIN — GABAPENTIN 300 MILLIGRAM(S): 400 CAPSULE ORAL at 17:18

## 2017-07-15 RX ADMIN — CEFTRIAXONE 100 GRAM(S): 500 INJECTION, POWDER, FOR SOLUTION INTRAMUSCULAR; INTRAVENOUS at 00:11

## 2017-07-15 RX ADMIN — SODIUM CHLORIDE 2000 MILLILITER(S): 9 INJECTION INTRAMUSCULAR; INTRAVENOUS; SUBCUTANEOUS at 20:23

## 2017-07-15 RX ADMIN — LEVETIRACETAM 250 MILLIGRAM(S): 250 TABLET, FILM COATED ORAL at 00:36

## 2017-07-15 RX ADMIN — Medication 40 MILLIEQUIVALENT(S): at 00:11

## 2017-07-15 RX ADMIN — HEPARIN SODIUM 5000 UNIT(S): 5000 INJECTION INTRAVENOUS; SUBCUTANEOUS at 06:10

## 2017-07-15 RX ADMIN — Medication 100 MILLIEQUIVALENT(S): at 00:10

## 2017-07-15 RX ADMIN — QUETIAPINE FUMARATE 100 MILLIGRAM(S): 200 TABLET, FILM COATED ORAL at 11:55

## 2017-07-15 RX ADMIN — Medication 10 MILLIGRAM(S): at 11:55

## 2017-07-15 RX ADMIN — OXCARBAZEPINE 450 MILLIGRAM(S): 300 TABLET, FILM COATED ORAL at 00:36

## 2017-07-15 RX ADMIN — Medication 40 MILLIGRAM(S): at 10:52

## 2017-07-15 RX ADMIN — AZITHROMYCIN 500 MILLIGRAM(S): 500 TABLET, FILM COATED ORAL at 00:11

## 2017-07-15 RX ADMIN — LEVETIRACETAM 250 MILLIGRAM(S): 250 TABLET, FILM COATED ORAL at 06:10

## 2017-07-15 RX ADMIN — PANTOPRAZOLE SODIUM 40 MILLIGRAM(S): 20 TABLET, DELAYED RELEASE ORAL at 00:10

## 2017-07-15 RX ADMIN — OXCARBAZEPINE 450 MILLIGRAM(S): 300 TABLET, FILM COATED ORAL at 21:49

## 2017-07-15 RX ADMIN — Medication 40 MILLIEQUIVALENT(S): at 06:10

## 2017-07-15 NOTE — CONSULT NOTE ADULT - SUBJECTIVE AND OBJECTIVE BOX
89 year old bedbound M w/ multiple co-morbidites presenting to ED with respiratory distress and altered mental status.  As per family, PMD was notified a few days prior and patient had CXR consistent with pneumonia and was started on IV antibiotics w/o improvement. From chart review, patient developed stridor in ED and was unable to tolerate secretions or protect airway and was intubated and transferred to MICU.  As per family patient has history of similar episodes, which occasionally require intubation but often respond well to atropine and steroids.  ENT consulted to be available for extubation and r/o tracheal or laryngeal pathology.  As per MICU team, intubation was not difficult.    PMH:   CAD  HTN  Dementia  GI bleed  Blind  Anemia  BPH  Epilepsy    PE:  Gen: elderly male, minimally responsive, opens eyes spontaneously but does follow commands  Resp: orotracheally intubated w/ 7.0 cuffed tube on mechanical ventilation, minimal vent settings, no leak  Neck: soft and flat, no evidence of prior tracheostomy placement    A/P:  89M w/ multiple co-morbidites including recurrent aspiration pneumonitis and recent PEG requirement, now w/ recurrent episodes of respiratory distress and stridor that respond well to steroids and atropine.  ENT consulted to be present for extubation and to rule out laryngeal or tracheal pathology as cause of respiratory distress, currently being treated for community acquired pneumonia, unable to evaluate for stridor at this time as patient remains intubated.  -agree with continuing steroids  -ENT will be present for extubation once extubation parameters for MICU have been met  -Call ENT with questions  -discussed with Chief resident

## 2017-07-15 NOTE — DIETITIAN INITIAL EVALUATION ADULT. - OTHER INFO
Respiratory  distress / NPO , intubated , no food allergys noted , Skin - intact , page scale 14 + constipated as per staff , Jevity 1.5  via peg feeds taken and kory as per staff

## 2017-07-15 NOTE — H&P ADULT - NSHPPHYSICALEXAM_GEN_ALL_CORE
VITAL SIGNS:  Vital Signs Last 24 Hrs  T(C): 36.8 (14 Jul 2017 22:00), Max: 36.8 (14 Jul 2017 22:00)  T(F): 98.2 (14 Jul 2017 22:00), Max: 98.2 (14 Jul 2017 22:00)  HR: 80 (14 Jul 2017 23:00) (80 - 92)  BP: 134/76 (14 Jul 2017 23:00) (104/76 - 169/94)  BP(mean): 100 (14 Jul 2017 22:00) (100 - 100)  RR: 11 (14 Jul 2017 23:00) (11 - 24)  SpO2: 99% (14 Jul 2017 23:00) (98% - 100%)    PHYSICAL EXAM:    General: intubated, sedated, R hand tremor  HEENT: R eye cloudy, NC/AT  Cardiovascular: +S1/S2; RRR,   Respiratory: CTA b/l; no W/R/R, no stridor appreciated  Gastrointestinal: +PEG, distended,   Extremities: WWP; no edema, clubbing or cyanosis  Neurological: unable to assess

## 2017-07-15 NOTE — H&P ADULT - NSHPLABSRESULTS_GEN_ALL_CORE
LABS:                        12.0   5.4   )-----------( 168      ( 14 Jul 2017 19:59 )             36.1     07-14    134<L>  |  90<L>  |  40<H>  ----------------------------<  207<H>  3.3<L>   |  27  |  0.60    Ca    8.7      14 Jul 2017 19:59    TPro  7.6  /  Alb  3.7  /  TBili  0.2  /  DBili  x   /  AST  33  /  ALT  37  /  AlkPhos  125<H>  07-14    PT/INR - ( 14 Jul 2017 20:14 )   PT: 10.6 sec;   INR: 0.96          PTT - ( 14 Jul 2017 20:14 )  PTT:27.9 sec    CAPILLARY BLOOD GLUCOSE    ASSESSMENT: 89 year old bedbound M w/ PMH of Alzheimer's dementia, HTN, CAD, CVA w/ left sided deficits, GI bleed, aspiration pneumonitis, epilepsy, w/ PEG, presenting to [facility] ED with respiratory distress and altered mental status.     #Neuro: currently sedated  -on high doses of AED and anti-psychotics at home: will continue at normal doses  -c/w Keppra, Trileptal, Seroquel and haldol prn  -c/w propofol for sedation    #Pulm: currently intubated and ventilated  -etiology of acute on chronic respiratory failure likely multifactorial: tracheitis vs CAP   -s/p vanc, ceftriaxone, and amikacin at home, no clear infiltrates on chest x-ray, c/w Ceftriaxone and Azithromycin for CAP  -f/u ABG as patient on ventilator  -s/p 125 mg solumedrol at home, will c/w solumedrol 40 mg q12h for tracheitis or airway edema (on prednisone 20 mg at home)    #ID: not septic, was tachypneic and tachycardic however no clear source  -f/u sputum culture, c/w ceftriaxone and azithromycin as above  -f/u blood culture  -lactate 2.2, will not continue to trend    #Cardiology  -with positive troponins in ED at 0.05, likely demand, will get official EKG, trend troponin  -f/u qTC as he is on many AED  -HTN: hold anti-HTN, not requiring pressors    #Endo  -SSI    #Heme  -anemia: Hgb 12, higher than in past, likely concentrated, hx of MEGHA, will continue to trend  -maintain active type and screen    #FEN  -HSQ for DVT prophylaxis    FULL CODE    Admit to MICU

## 2017-07-15 NOTE — H&P ADULT - ATTENDING COMMENTS
Patient seen and examined with house-staff during bedside rounds.  Resident note read, including vitals, physical findings, laboratory data, and radiological reports.   Revisions included below.  Direct personal management at bed side and extensive interpretation of the data.  Plan was outlined and discussed in details with the housestaff.  Decision making of high complexity  I discussed the case in detail with the family. Patient has respiratory failure secondary to tracheobronchitis, stool impaction/ileus, CAD, HTN.  the patient is not a candidate for weaning. Try her pressure support ventilation was unsuccessful. The patient started to become tachypneic within seconds from being on pressure support support.  abdominal x-ray revealed ileus with fecal impaction associated with dilated bowel loops.  patient will require manual disimpaction followed by laxative. Patient is paced on Rocephin and azithromycin for community-acquired pneumonia. Patient most likely is aspiration with tracheobronchitis. The chest x-ray does not reveal any infiltrate suggestive of pneumonia. Send sputum culture, follow the Gram stain, and adjust antibiotic.  hold tube feeds for now.

## 2017-07-15 NOTE — H&P ADULT - PMH
Atherosclerosis of coronary artery  CAD (coronary artery disease)  Cardiomegaly    Cerebral artery occlusion with cerebral infarction  CVA (cerebral vascular accident)  Essential hypertension  HTN (hypertension)  Hyperlipidemia  HLD (hyperlipidemia)  Iron deficiency anemia  Iron deficiency anemia  Legally blind    Prostate hyperplasia with urinary obstruction    Seizure disorder

## 2017-07-16 LAB
ANION GAP SERPL CALC-SCNC: 14 MMOL/L — SIGNIFICANT CHANGE UP (ref 5–17)
APTT BLD: 27.5 SEC — SIGNIFICANT CHANGE UP (ref 27.5–37.4)
BUN SERPL-MCNC: 26 MG/DL — HIGH (ref 7–23)
CALCIUM SERPL-MCNC: 8.5 MG/DL — SIGNIFICANT CHANGE UP (ref 8.4–10.5)
CHLORIDE SERPL-SCNC: 105 MMOL/L — SIGNIFICANT CHANGE UP (ref 96–108)
CK MB CFR SERPL CALC: 2.7 NG/ML — SIGNIFICANT CHANGE UP (ref 0–6.7)
CK SERPL-CCNC: 78 U/L — SIGNIFICANT CHANGE UP (ref 30–200)
CO2 SERPL-SCNC: 21 MMOL/L — LOW (ref 22–31)
CREAT SERPL-MCNC: 0.6 MG/DL — SIGNIFICANT CHANGE UP (ref 0.5–1.3)
GLUCOSE SERPL-MCNC: 126 MG/DL — HIGH (ref 70–99)
GRAM STN FLD: SIGNIFICANT CHANGE UP
HCT VFR BLD CALC: 32.6 % — LOW (ref 39–50)
HGB BLD-MCNC: 10.9 G/DL — LOW (ref 13–17)
MAGNESIUM SERPL-MCNC: 2.4 MG/DL — SIGNIFICANT CHANGE UP (ref 1.6–2.6)
MCHC RBC-ENTMCNC: 30.6 PG — SIGNIFICANT CHANGE UP (ref 27–34)
MCHC RBC-ENTMCNC: 33.4 G/DL — SIGNIFICANT CHANGE UP (ref 32–36)
MCV RBC AUTO: 91.6 FL — SIGNIFICANT CHANGE UP (ref 80–100)
PLATELET # BLD AUTO: 154 K/UL — SIGNIFICANT CHANGE UP (ref 150–400)
POTASSIUM SERPL-MCNC: 4.1 MMOL/L — SIGNIFICANT CHANGE UP (ref 3.5–5.3)
POTASSIUM SERPL-SCNC: 4.1 MMOL/L — SIGNIFICANT CHANGE UP (ref 3.5–5.3)
PROCALCITONIN SERPL-MCNC: 0.54 NG/ML — HIGH (ref 0–0.04)
RBC # BLD: 3.56 M/UL — LOW (ref 4.2–5.8)
RBC # FLD: 15.3 % — SIGNIFICANT CHANGE UP (ref 10.3–16.9)
SODIUM SERPL-SCNC: 140 MMOL/L — SIGNIFICANT CHANGE UP (ref 135–145)
SPECIMEN SOURCE: SIGNIFICANT CHANGE UP
TROPONIN T SERPL-MCNC: 0.07 NG/ML — CRITICAL HIGH (ref 0–0.01)
WBC # BLD: 5.1 K/UL — SIGNIFICANT CHANGE UP (ref 3.8–10.5)
WBC # FLD AUTO: 5.1 K/UL — SIGNIFICANT CHANGE UP (ref 3.8–10.5)

## 2017-07-16 PROCEDURE — 99233 SBSQ HOSP IP/OBS HIGH 50: CPT | Mod: GC

## 2017-07-16 PROCEDURE — 74000: CPT | Mod: 26

## 2017-07-16 RX ORDER — ACETAMINOPHEN 500 MG
650 TABLET ORAL EVERY 6 HOURS
Qty: 0 | Refills: 0 | Status: DISCONTINUED | OUTPATIENT
Start: 2017-07-16 | End: 2017-07-18

## 2017-07-16 RX ORDER — LACTULOSE 10 G/15ML
10 SOLUTION ORAL
Qty: 0 | Refills: 0 | Status: DISCONTINUED | OUTPATIENT
Start: 2017-07-16 | End: 2017-07-18

## 2017-07-16 RX ORDER — QUETIAPINE FUMARATE 200 MG/1
150 TABLET, FILM COATED ORAL
Qty: 0 | Refills: 0 | Status: DISCONTINUED | OUTPATIENT
Start: 2017-07-16 | End: 2017-07-17

## 2017-07-16 RX ORDER — POLYETHYLENE GLYCOL 3350 17 G/17G
17 POWDER, FOR SOLUTION ORAL
Qty: 0 | Refills: 0 | Status: DISCONTINUED | OUTPATIENT
Start: 2017-07-16 | End: 2017-07-18

## 2017-07-16 RX ORDER — SENNA PLUS 8.6 MG/1
2 TABLET ORAL AT BEDTIME
Qty: 0 | Refills: 0 | Status: DISCONTINUED | OUTPATIENT
Start: 2017-07-16 | End: 2017-07-18

## 2017-07-16 RX ORDER — QUETIAPINE FUMARATE 200 MG/1
100 TABLET, FILM COATED ORAL ONCE
Qty: 0 | Refills: 0 | Status: COMPLETED | OUTPATIENT
Start: 2017-07-16 | End: 2017-07-16

## 2017-07-16 RX ORDER — PROPOFOL 10 MG/ML
5 INJECTION, EMULSION INTRAVENOUS
Qty: 1000 | Refills: 0 | Status: DISCONTINUED | OUTPATIENT
Start: 2017-07-16 | End: 2017-07-16

## 2017-07-16 RX ADMIN — GABAPENTIN 300 MILLIGRAM(S): 400 CAPSULE ORAL at 06:04

## 2017-07-16 RX ADMIN — Medication 650 MILLIGRAM(S): at 06:04

## 2017-07-16 RX ADMIN — LACTULOSE 10 GRAM(S): 10 SOLUTION ORAL at 10:40

## 2017-07-16 RX ADMIN — HEPARIN SODIUM 5000 UNIT(S): 5000 INJECTION INTRAVENOUS; SUBCUTANEOUS at 21:07

## 2017-07-16 RX ADMIN — LACTULOSE 10 GRAM(S): 10 SOLUTION ORAL at 17:28

## 2017-07-16 RX ADMIN — LEVETIRACETAM 250 MILLIGRAM(S): 250 TABLET, FILM COATED ORAL at 06:05

## 2017-07-16 RX ADMIN — PANTOPRAZOLE SODIUM 40 MILLIGRAM(S): 20 TABLET, DELAYED RELEASE ORAL at 22:39

## 2017-07-16 RX ADMIN — LEVETIRACETAM 250 MILLIGRAM(S): 250 TABLET, FILM COATED ORAL at 17:28

## 2017-07-16 RX ADMIN — Medication 3 MILLILITER(S): at 16:38

## 2017-07-16 RX ADMIN — HALOPERIDOL DECANOATE 2 MILLIGRAM(S): 100 INJECTION INTRAMUSCULAR at 14:27

## 2017-07-16 RX ADMIN — HEPARIN SODIUM 5000 UNIT(S): 5000 INJECTION INTRAVENOUS; SUBCUTANEOUS at 06:04

## 2017-07-16 RX ADMIN — Medication 650 MILLIGRAM(S): at 14:27

## 2017-07-16 RX ADMIN — Medication 0.5 MILLIGRAM(S): at 05:41

## 2017-07-16 RX ADMIN — Medication 40 MILLIGRAM(S): at 09:23

## 2017-07-16 RX ADMIN — Medication 650 MILLIGRAM(S): at 15:00

## 2017-07-16 RX ADMIN — OXCARBAZEPINE 300 MILLIGRAM(S): 300 TABLET, FILM COATED ORAL at 10:40

## 2017-07-16 RX ADMIN — Medication 10 MILLIGRAM(S): at 10:40

## 2017-07-16 RX ADMIN — Medication 0.5 MILLIGRAM(S): at 16:38

## 2017-07-16 RX ADMIN — POLYETHYLENE GLYCOL 3350 17 GRAM(S): 17 POWDER, FOR SOLUTION ORAL at 17:28

## 2017-07-16 RX ADMIN — OXCARBAZEPINE 450 MILLIGRAM(S): 300 TABLET, FILM COATED ORAL at 21:08

## 2017-07-16 RX ADMIN — SENNA PLUS 2 TABLET(S): 8.6 TABLET ORAL at 21:07

## 2017-07-16 RX ADMIN — GABAPENTIN 300 MILLIGRAM(S): 400 CAPSULE ORAL at 17:28

## 2017-07-16 RX ADMIN — Medication 650 MILLIGRAM(S): at 08:59

## 2017-07-16 RX ADMIN — QUETIAPINE FUMARATE 150 MILLIGRAM(S): 200 TABLET, FILM COATED ORAL at 21:13

## 2017-07-16 RX ADMIN — SODIUM CHLORIDE 110 MILLILITER(S): 9 INJECTION INTRAMUSCULAR; INTRAVENOUS; SUBCUTANEOUS at 22:39

## 2017-07-16 RX ADMIN — QUETIAPINE FUMARATE 100 MILLIGRAM(S): 200 TABLET, FILM COATED ORAL at 10:41

## 2017-07-16 RX ADMIN — HEPARIN SODIUM 5000 UNIT(S): 5000 INJECTION INTRAVENOUS; SUBCUTANEOUS at 14:27

## 2017-07-16 RX ADMIN — SODIUM CHLORIDE 110 MILLILITER(S): 9 INJECTION INTRAMUSCULAR; INTRAVENOUS; SUBCUTANEOUS at 13:25

## 2017-07-16 RX ADMIN — QUETIAPINE FUMARATE 50 MILLIGRAM(S): 200 TABLET, FILM COATED ORAL at 06:05

## 2017-07-16 NOTE — PROGRESS NOTE ADULT - ATTENDING COMMENTS
Patient seen and examined with house-staff during bedside rounds.  Resident note read, including vitals, physical findings, laboratory data, and radiological reports.   Revisions included below.  Direct personal management at bed side and extensive interpretation of the data.  Plan was outlined and discussed in details with the housestaff.  Decision making of high complexity  respiratory failure aspiration pneumonia coronary artery disease patient is clinically stable. Patient was extubated with ENT at the bedside. Postextubation pulmonary status is adequate. Increase Seroquel to decrease agitation.  continue antibiotic. Continue to feed. I discussed the case in details with the family

## 2017-07-16 NOTE — PROGRESS NOTE ADULT - SUBJECTIVE AND OBJECTIVE BOX
INTERVAL HPI/OVERNIGHT EVENTS:    SUBJECTIVE: Patient seen and examined at bedside.    OBJECTIVE:    VITAL SIGNS:  ICU Vital Signs Last 24 Hrs  T(C): 36.5 (16 Jul 2017 10:45), Max: 37.4 (15 Jul 2017 15:15)  T(F): 97.7 (16 Jul 2017 10:45), Max: 99.3 (15 Jul 2017 15:15)  HR: 64 (16 Jul 2017 13:00) (42 - 84)  BP: 162/81 (16 Jul 2017 13:00) (81/61 - 183/86)  BP(mean): 116 (16 Jul 2017 13:00) (58 - 129)  ABP: --  ABP(mean): --  RR: 25 (16 Jul 2017 13:00) (11 - 41)  SpO2: 99% (16 Jul 2017 13:00) (96% - 100%)    Mode: CPAP with PS, FiO2: 40, PEEP: 5, PS: 10, MAP: 8, PIP: 16    07-15 @ 07:01  -  07-16 @ 07:00  --------------------------------------------------------  IN: 2680.2 mL / OUT: 726 mL / NET: 1954.2 mL    07-16 @ 07:01  -  07-16 @ 13:51  --------------------------------------------------------  IN: 860 mL / OUT: 240 mL / NET: 620 mL      CAPILLARY BLOOD GLUCOSE  150 (15 Jul 2017 16:51)          PHYSICAL EXAM:    General: NAD  HEENT: NC/AT; PERRL, clear conjunctiva  Neck: supple  Respiratory: CTA b/l  Cardiovascular: +S1/S2; RRR  Abdomen: soft, NT/ND; +BS x4  Extremities: WWP, 2+ peripheral pulses b/l; no LE edema  Skin: normal color and turgor; no rash  Neurological:     MEDICATIONS:  MEDICATIONS  (STANDING):  heparin  Injectable 5000 Unit(s) SubCutaneous every 8 hours  insulin lispro (HumaLOG) corrective regimen sliding scale   SubCutaneous Before meals and at bedtime  dextrose 5%. 1000 milliLiter(s) (50 mL/Hr) IV Continuous <Continuous>  dextrose 50% Injectable 12.5 Gram(s) IV Push once  sodium chloride 0.9%. 1000 milliLiter(s) (110 mL/Hr) IV Continuous <Continuous>  OXcarbazepine 300 milliGRAM(s) Oral daily  OXcarbazepine 450 milliGRAM(s) Oral at bedtime  gabapentin 300 milliGRAM(s) Oral two times a day  levETIRAcetam 250 milliGRAM(s) Oral two times a day  busPIRone 10 milliGRAM(s) Oral daily  pantoprazole  Injectable 40 milliGRAM(s) IV Push every 24 hours  buDESOnide   0.5 milliGRAM(s) Respule 0.5 milliGRAM(s) Nebulizer two times a day  senna 2 Tablet(s) Oral at bedtime  polyethylene glycol 3350 17 Gram(s) Oral two times a day  lactulose Syrup 10 Gram(s) Oral two times a day  QUEtiapine 150 milliGRAM(s) Oral two times a day    MEDICATIONS  (PRN):  dextrose Gel 1 Dose(s) Oral once PRN Blood Glucose LESS THAN 70 milliGRAM(s)/deciliter  glucagon  Injectable 1 milliGRAM(s) IntraMuscular once PRN Glucose LESS THAN 70 milligrams/deciliter  haloperidol     Tablet 2 milliGRAM(s) Oral two times a day PRN agitation  ALBUTerol/ipratropium for Nebulization 3 milliLiter(s) Nebulizer every 6 hours PRN Shortness of Breath and/or Wheezing  acetaminophen   Tablet. 650 milliGRAM(s) Oral every 6 hours PRN Moderate Pain (4 - 6)      ALLERGIES:  Allergies    No Known Allergies    Intolerances        LABS:                        10.9   5.1   )-----------( 154      ( 16 Jul 2017 05:00 )             32.6     07-16    140  |  105  |  26<H>  ----------------------------<  126<H>  4.1   |  21<L>  |  0.60    Ca    8.5      16 Jul 2017 05:02  Phos  2.1     07-15  Mg     2.4     07-16    TPro  7.6  /  Alb  3.7  /  TBili  0.2  /  DBili  x   /  AST  33  /  ALT  37  /  AlkPhos  125<H>  07-14    PT/INR - ( 15 Jul 2017 03:09 )   PT: 12.1 sec;   INR: 1.09          PTT - ( 16 Jul 2017 05:00 )  PTT:27.5 sec      RADIOLOGY & ADDITIONAL TESTS: Reviewed. INTERVAL HPI/OVERNIGHT EVENTS:    SUBJECTIVE: Patient seen and examined at bedside.    OBJECTIVE:    VITAL SIGNS:  ICU Vital Signs Last 24 Hrs  T(C): 36.5 (16 Jul 2017 10:45), Max: 37.4 (15 Jul 2017 15:15)  T(F): 97.7 (16 Jul 2017 10:45), Max: 99.3 (15 Jul 2017 15:15)  HR: 64 (16 Jul 2017 13:00) (42 - 84)  BP: 162/81 (16 Jul 2017 13:00) (81/61 - 183/86)  BP(mean): 116 (16 Jul 2017 13:00) (58 - 129)  ABP: --  ABP(mean): --  RR: 25 (16 Jul 2017 13:00) (11 - 41)  SpO2: 99% (16 Jul 2017 13:00) (96% - 100%)    Mode: CPAP with PS, FiO2: 40, PEEP: 5, PS: 10, MAP: 8, PIP: 16    07-15 @ 07:01  -  07-16 @ 07:00  --------------------------------------------------------  IN: 2680.2 mL / OUT: 726 mL / NET: 1954.2 mL    07-16 @ 07:01  -  07-16 @ 13:51  --------------------------------------------------------  IN: 860 mL / OUT: 240 mL / NET: 620 mL      CAPILLARY BLOOD GLUCOSE  150 (15 Jul 2017 16:51)          PHYSICAL EXAM:    General: NAD  HEENT: NC/AT; cloudy R eye  Neck: supple  Respiratory: CTA b/l  Cardiovascular: +S1/S2; RRR  Abdomen: soft, nontender +Distended abdomen; +PEG; +BS  Extremities: WWP, 2+ peripheral pulses b/l; no LE edema  Skin: normal color and turgor; no rash    MEDICATIONS:  MEDICATIONS  (STANDING):  heparin  Injectable 5000 Unit(s) SubCutaneous every 8 hours  insulin lispro (HumaLOG) corrective regimen sliding scale   SubCutaneous Before meals and at bedtime  dextrose 5%. 1000 milliLiter(s) (50 mL/Hr) IV Continuous <Continuous>  dextrose 50% Injectable 12.5 Gram(s) IV Push once  sodium chloride 0.9%. 1000 milliLiter(s) (110 mL/Hr) IV Continuous <Continuous>  OXcarbazepine 300 milliGRAM(s) Oral daily  OXcarbazepine 450 milliGRAM(s) Oral at bedtime  gabapentin 300 milliGRAM(s) Oral two times a day  levETIRAcetam 250 milliGRAM(s) Oral two times a day  busPIRone 10 milliGRAM(s) Oral daily  pantoprazole  Injectable 40 milliGRAM(s) IV Push every 24 hours  buDESOnide   0.5 milliGRAM(s) Respule 0.5 milliGRAM(s) Nebulizer two times a day  senna 2 Tablet(s) Oral at bedtime  polyethylene glycol 3350 17 Gram(s) Oral two times a day  lactulose Syrup 10 Gram(s) Oral two times a day  QUEtiapine 150 milliGRAM(s) Oral two times a day    MEDICATIONS  (PRN):  dextrose Gel 1 Dose(s) Oral once PRN Blood Glucose LESS THAN 70 milliGRAM(s)/deciliter  glucagon  Injectable 1 milliGRAM(s) IntraMuscular once PRN Glucose LESS THAN 70 milligrams/deciliter  haloperidol     Tablet 2 milliGRAM(s) Oral two times a day PRN agitation  ALBUTerol/ipratropium for Nebulization 3 milliLiter(s) Nebulizer every 6 hours PRN Shortness of Breath and/or Wheezing  acetaminophen   Tablet. 650 milliGRAM(s) Oral every 6 hours PRN Moderate Pain (4 - 6)      ALLERGIES:  Allergies    No Known Allergies    Intolerances        LABS:                        10.9   5.1   )-----------( 154      ( 16 Jul 2017 05:00 )             32.6     07-16    140  |  105  |  26<H>  ----------------------------<  126<H>  4.1   |  21<L>  |  0.60    Ca    8.5      16 Jul 2017 05:02  Phos  2.1     07-15  Mg     2.4     07-16    TPro  7.6  /  Alb  3.7  /  TBili  0.2  /  DBili  x   /  AST  33  /  ALT  37  /  AlkPhos  125<H>  07-14    PT/INR - ( 15 Jul 2017 03:09 )   PT: 12.1 sec;   INR: 1.09          PTT - ( 16 Jul 2017 05:00 )  PTT:27.5 sec      RADIOLOGY & ADDITIONAL TESTS: Reviewed.

## 2017-07-16 NOTE — PROGRESS NOTE ADULT - ASSESSMENT
89 yoM w/ PMH of Alzheimer's dementia, HTN, CAD, CVA w/ left sided deficits, GI bleed, aspiration pneumonitis, epilepsy, w/ PEG, presenting to [facility] ED with respiratory distress and altered mental status.     #Neuro: awake and alert  -on high doses of AED and anti-psychotics at home: will continue at normal doses  -c/w Keppra, Trileptal, Seroquel and haldol prn  - off propofol   - s/p extubation with ENT, no stridor  - haldol 2mg PO BID prn for agitation    #Pulm: s/p extubation  -etiology of acute on chronic respiratory failure likely multifactorial: tracheitis vs CAP   -s/p vanc, ceftriaxone, and amikacin at home, no clear infiltrates on chest x-ray, DC'd ceftriaxone and azithromycin  -DC'd solumedrol     #ID: not septic, was tachypneic and tachycardic however no clear source  -f/u sputum culture (7/14) - rejected due to contamination  -f/u blood culture (7/14) - NG at 1 day  -lactate 2.2, will not continue to trend    #Cardiology  -with positive troponins 0.07, likely demand, will not repeat  - , wnl  -HTN: hold anti-HTN, not requiring pressors    #GI  - distended bowel loops on Xray abdomen  - 1 large BM s/p enema  - PEG on suction  - rpt Xray abdomen (7/16) showing retained fecal material  - on senna, colace, lactulose      #Endo  -SSI    #Heme  -anemia: Hgb 10.9, stable at baseline  -monitor H/H, continue to trend  -maintain active type and screen    #FEN  -NS  - HSQ for DVT prophylaxis    FULL CODE    Admit to MICU

## 2017-07-16 NOTE — CONSULT NOTE ADULT - SUBJECTIVE AND OBJECTIVE BOX
Patient had positive leak test and met all MICU extubation criteria.  ENT present at bedside for extubation.     PE:  Gen: arousable, repsonsive, inconsistently following commands  Resp: +leak test, cuff deflated, ET tube removed  No stridor s/p extubation, satting 100% on face tent w/ humidified air  no tachypnea or retractions  NPL: np/op/hp clear, piriforms open- no pooling of secretions, TVC mobile bilaterally    A/P:  89M w/ respiratory distress and stridor requiring intubation, now s/p extubation, doing well, no obvious pathology on exam, no stridor s/p extubation  -recommend continued steroids x2 doses  -pulmonary toilet  -reconsult ENT for further evaluation if stridor develops   -Call ENT with questions  -ENT chief resident present throughout evaluation, agrees with plan above

## 2017-07-17 LAB
ALBUMIN SERPL ELPH-MCNC: 3.3 G/DL — SIGNIFICANT CHANGE UP (ref 3.3–5)
ALP SERPL-CCNC: 83 U/L — SIGNIFICANT CHANGE UP (ref 40–120)
ALT FLD-CCNC: 29 U/L — SIGNIFICANT CHANGE UP (ref 10–45)
ANION GAP SERPL CALC-SCNC: 12 MMOL/L — SIGNIFICANT CHANGE UP (ref 5–17)
AST SERPL-CCNC: 22 U/L — SIGNIFICANT CHANGE UP (ref 10–40)
BASE EXCESS BLDA CALC-SCNC: -2.1 MMOL/L — LOW (ref -2–3)
BILIRUB SERPL-MCNC: 0.3 MG/DL — SIGNIFICANT CHANGE UP (ref 0.2–1.2)
BUN SERPL-MCNC: 17 MG/DL — SIGNIFICANT CHANGE UP (ref 7–23)
CALCIUM SERPL-MCNC: 8.4 MG/DL — SIGNIFICANT CHANGE UP (ref 8.4–10.5)
CHLORIDE SERPL-SCNC: 108 MMOL/L — SIGNIFICANT CHANGE UP (ref 96–108)
CO2 SERPL-SCNC: 24 MMOL/L — SIGNIFICANT CHANGE UP (ref 22–31)
CREAT SERPL-MCNC: 0.5 MG/DL — SIGNIFICANT CHANGE UP (ref 0.5–1.3)
GAS PNL BLDA: SIGNIFICANT CHANGE UP
GLUCOSE SERPL-MCNC: 77 MG/DL — SIGNIFICANT CHANGE UP (ref 70–99)
HCO3 BLDA-SCNC: 24 MMOL/L — SIGNIFICANT CHANGE UP (ref 21–28)
HCT VFR BLD CALC: 32.1 % — LOW (ref 39–50)
HGB BLD-MCNC: 10.4 G/DL — LOW (ref 13–17)
MAGNESIUM SERPL-MCNC: 2.3 MG/DL — SIGNIFICANT CHANGE UP (ref 1.6–2.6)
MCHC RBC-ENTMCNC: 30.2 PG — SIGNIFICANT CHANGE UP (ref 27–34)
MCHC RBC-ENTMCNC: 32.4 G/DL — SIGNIFICANT CHANGE UP (ref 32–36)
MCV RBC AUTO: 93.3 FL — SIGNIFICANT CHANGE UP (ref 80–100)
PCO2 BLDA: 47 MMHG — SIGNIFICANT CHANGE UP (ref 35–48)
PH BLDA: 7.33 — LOW (ref 7.35–7.45)
PHOSPHATE SERPL-MCNC: 2.9 MG/DL — SIGNIFICANT CHANGE UP (ref 2.5–4.5)
PLATELET # BLD AUTO: 128 K/UL — LOW (ref 150–400)
PO2 BLDA: 187 MMHG — HIGH (ref 83–108)
POTASSIUM SERPL-MCNC: 4.3 MMOL/L — SIGNIFICANT CHANGE UP (ref 3.5–5.3)
POTASSIUM SERPL-SCNC: 4.3 MMOL/L — SIGNIFICANT CHANGE UP (ref 3.5–5.3)
PROT SERPL-MCNC: 6.3 G/DL — SIGNIFICANT CHANGE UP (ref 6–8.3)
RBC # BLD: 3.44 M/UL — LOW (ref 4.2–5.8)
RBC # FLD: 15.2 % — SIGNIFICANT CHANGE UP (ref 10.3–16.9)
SAO2 % BLDA: 100 % — SIGNIFICANT CHANGE UP (ref 95–100)
SODIUM SERPL-SCNC: 144 MMOL/L — SIGNIFICANT CHANGE UP (ref 135–145)
WBC # BLD: 6.1 K/UL — SIGNIFICANT CHANGE UP (ref 3.8–10.5)
WBC # FLD AUTO: 6.1 K/UL — SIGNIFICANT CHANGE UP (ref 3.8–10.5)

## 2017-07-17 PROCEDURE — 99233 SBSQ HOSP IP/OBS HIGH 50: CPT | Mod: GC

## 2017-07-17 PROCEDURE — 71010: CPT | Mod: 26

## 2017-07-17 RX ORDER — SODIUM CHLORIDE 9 MG/ML
1000 INJECTION, SOLUTION INTRAVENOUS
Qty: 0 | Refills: 0 | Status: DISCONTINUED | OUTPATIENT
Start: 2017-07-17 | End: 2017-07-18

## 2017-07-17 RX ORDER — LEVETIRACETAM 250 MG/1
250 TABLET, FILM COATED ORAL
Qty: 0 | Refills: 0 | Status: DISCONTINUED | OUTPATIENT
Start: 2017-07-17 | End: 2017-07-18

## 2017-07-17 RX ORDER — QUETIAPINE FUMARATE 200 MG/1
100 TABLET, FILM COATED ORAL EVERY 6 HOURS
Qty: 0 | Refills: 0 | Status: DISCONTINUED | OUTPATIENT
Start: 2017-07-17 | End: 2017-07-17

## 2017-07-17 RX ORDER — HALOPERIDOL DECANOATE 100 MG/ML
2 INJECTION INTRAMUSCULAR ONCE
Qty: 0 | Refills: 0 | Status: COMPLETED | OUTPATIENT
Start: 2017-07-17 | End: 2017-07-17

## 2017-07-17 RX ORDER — QUETIAPINE FUMARATE 200 MG/1
150 TABLET, FILM COATED ORAL EVERY 8 HOURS
Qty: 0 | Refills: 0 | Status: DISCONTINUED | OUTPATIENT
Start: 2017-07-17 | End: 2017-07-17

## 2017-07-17 RX ORDER — QUETIAPINE FUMARATE 200 MG/1
150 TABLET, FILM COATED ORAL EVERY 8 HOURS
Qty: 0 | Refills: 0 | Status: DISCONTINUED | OUTPATIENT
Start: 2017-07-17 | End: 2017-07-18

## 2017-07-17 RX ORDER — LOSARTAN POTASSIUM 100 MG/1
50 TABLET, FILM COATED ORAL DAILY
Qty: 0 | Refills: 0 | Status: DISCONTINUED | OUTPATIENT
Start: 2017-07-17 | End: 2017-07-18

## 2017-07-17 RX ORDER — PANTOPRAZOLE SODIUM 20 MG/1
40 TABLET, DELAYED RELEASE ORAL
Qty: 0 | Refills: 0 | Status: DISCONTINUED | OUTPATIENT
Start: 2017-07-17 | End: 2017-07-18

## 2017-07-17 RX ADMIN — LEVETIRACETAM 250 MILLIGRAM(S): 250 TABLET, FILM COATED ORAL at 07:09

## 2017-07-17 RX ADMIN — Medication 0.5 MILLIGRAM(S): at 06:16

## 2017-07-17 RX ADMIN — QUETIAPINE FUMARATE 150 MILLIGRAM(S): 200 TABLET, FILM COATED ORAL at 07:10

## 2017-07-17 RX ADMIN — HEPARIN SODIUM 5000 UNIT(S): 5000 INJECTION INTRAVENOUS; SUBCUTANEOUS at 14:44

## 2017-07-17 RX ADMIN — LACTULOSE 10 GRAM(S): 10 SOLUTION ORAL at 17:27

## 2017-07-17 RX ADMIN — POLYETHYLENE GLYCOL 3350 17 GRAM(S): 17 POWDER, FOR SOLUTION ORAL at 07:11

## 2017-07-17 RX ADMIN — HEPARIN SODIUM 5000 UNIT(S): 5000 INJECTION INTRAVENOUS; SUBCUTANEOUS at 07:10

## 2017-07-17 RX ADMIN — LEVETIRACETAM 250 MILLIGRAM(S): 250 TABLET, FILM COATED ORAL at 17:28

## 2017-07-17 RX ADMIN — Medication 0.5 MILLIGRAM(S): at 18:50

## 2017-07-17 RX ADMIN — HALOPERIDOL DECANOATE 2 MILLIGRAM(S): 100 INJECTION INTRAMUSCULAR at 14:45

## 2017-07-17 RX ADMIN — SODIUM CHLORIDE 80 MILLILITER(S): 9 INJECTION, SOLUTION INTRAVENOUS at 19:50

## 2017-07-17 RX ADMIN — QUETIAPINE FUMARATE 150 MILLIGRAM(S): 200 TABLET, FILM COATED ORAL at 21:23

## 2017-07-17 RX ADMIN — LACTULOSE 10 GRAM(S): 10 SOLUTION ORAL at 07:10

## 2017-07-17 RX ADMIN — SENNA PLUS 2 TABLET(S): 8.6 TABLET ORAL at 21:22

## 2017-07-17 RX ADMIN — HALOPERIDOL DECANOATE 2 MILLIGRAM(S): 100 INJECTION INTRAMUSCULAR at 01:41

## 2017-07-17 RX ADMIN — Medication 2: at 22:26

## 2017-07-17 RX ADMIN — HEPARIN SODIUM 5000 UNIT(S): 5000 INJECTION INTRAVENOUS; SUBCUTANEOUS at 21:22

## 2017-07-17 RX ADMIN — QUETIAPINE FUMARATE 100 MILLIGRAM(S): 200 TABLET, FILM COATED ORAL at 15:08

## 2017-07-17 RX ADMIN — HALOPERIDOL DECANOATE 2 MILLIGRAM(S): 100 INJECTION INTRAMUSCULAR at 07:11

## 2017-07-17 RX ADMIN — Medication 650 MILLIGRAM(S): at 01:41

## 2017-07-17 RX ADMIN — Medication 10 MILLIGRAM(S): at 12:16

## 2017-07-17 RX ADMIN — OXCARBAZEPINE 300 MILLIGRAM(S): 300 TABLET, FILM COATED ORAL at 12:16

## 2017-07-17 RX ADMIN — Medication 650 MILLIGRAM(S): at 02:36

## 2017-07-17 RX ADMIN — GABAPENTIN 300 MILLIGRAM(S): 400 CAPSULE ORAL at 07:09

## 2017-07-17 RX ADMIN — GABAPENTIN 300 MILLIGRAM(S): 400 CAPSULE ORAL at 17:27

## 2017-07-17 RX ADMIN — OXCARBAZEPINE 450 MILLIGRAM(S): 300 TABLET, FILM COATED ORAL at 21:30

## 2017-07-17 RX ADMIN — POLYETHYLENE GLYCOL 3350 17 GRAM(S): 17 POWDER, FOR SOLUTION ORAL at 17:29

## 2017-07-17 RX ADMIN — Medication 20 MILLIGRAM(S): at 14:45

## 2017-07-17 RX ADMIN — SODIUM CHLORIDE 80 MILLILITER(S): 9 INJECTION, SOLUTION INTRAVENOUS at 06:37

## 2017-07-17 RX ADMIN — PANTOPRAZOLE SODIUM 40 MILLIGRAM(S): 20 TABLET, DELAYED RELEASE ORAL at 17:29

## 2017-07-17 NOTE — PROGRESS NOTE ADULT - ASSESSMENT
89 yoM w/ PMH of Alzheimer's dementia, HTN, CAD, CVA w/ left sided deficits, GI bleed, aspiration pneumonitis, epilepsy, w/ PEG, presenting to [facility] ED with respiratory distress and altered mental status.     #Neuro: awake and alert  -on high doses of AED and anti-psychotics at home: will continue at normal doses  -c/w Keppra, Trileptal, Seroquel and haldol prn  - off propofol   - s/p extubation with ENT, no stridor  - haldol 2mg PO BID prn for agitation    #Pulm: s/p extubation  -etiology of acute on chronic respiratory failure likely multifactorial: tracheitis vs CAP   -s/p vanc, ceftriaxone, and amikacin at home, no clear infiltrates on chest x-ray, DC'd ceftriaxone and azithromycin  -DC'd solumedrol     #ID: not septic, was tachypneic and tachycardic however no clear source  -f/u sputum culture (7/14) - rejected due to contamination  -f/u blood culture (7/14) - NG at 1 day  -lactate 2.2, will not continue to trend    #Cardiology  -with positive troponins 0.07, likely demand, will not repeat  - , wnl  -HTN: hold anti-HTN, not requiring pressors    #GI  - distended bowel loops on Xray abdomen  - 1 large BM s/p enema  - PEG on suction  - rpt Xray abdomen (7/16) showing retained fecal material  - on senna, colace, lactulose      #Endo  -SSI    #Heme  -anemia: Hgb 10.9, stable at baseline  -monitor H/H, continue to trend  -maintain active type and screen    #FEN  -NS  - HSQ for DVT prophylaxis 89 yoM w/ PMH of Alzheimer's dementia, HTN, CAD, CVA w/ left sided deficits, GI bleed, aspiration pneumonitis, epilepsy, w/ PEG, presenting to [facility] ED with respiratory distress and altered mental status.     #Neuro: awake and alert  -on high doses of AED and anti-psychotics at home: will continue at normal doses  -c/w Keppra, Trileptal, Seroquel and haldol prn  - off propofol   - s/p extubation with ENT, no stridor  - haldol 2mg PO BID prn for agitation    #Pulm: s/p extubation  -etiology of acute on chronic respiratory failure likely multifactorial: tracheitis vs CAP   -s/p vanc, ceftriaxone, and amikacin at home, no clear infiltrates on chest x-ray, DC'd ceftriaxone and azithromycin  -DC'd solumedrol     #ID: not septic, was tachypneic and tachycardic however no clear source  -sputum culture shows gram negative rods  -blood cultures negative    #Cardiology  -with positive troponins 0.07, likely demand, will not repeat  - , wnl  -HTN: hold anti-HTN, not requiring pressors    #GI  - distended bowel loops on Xray abdomen  - 1 large BM s/p enema  - PEG on suction  - rpt Xray abdomen (7/16) showing retained fecal material  - on senna, miralax, lactulose      #Endo  -SSI    #Heme  -anemia: Hgb 10.9, stable at baseline  -monitor H/H, continue to trend  -maintain active type and screen    #FEN  -NS, Replete lytes K>4, Mg>2  - HSQ for DVT prophylaxis 89 yoM w/ PMH of Alzheimer's dementia, HTN, CAD, CVA w/ left sided deficits, GI bleed, aspiration pneumonitis, epilepsy, w/ PEG, presenting to [facility] ED with respiratory distress and altered mental status.     #Neuro: awake and alert  -on high doses of AED and anti-psychotics at home: will continue at normal doses  -c/w Keppra, Trileptal, Seroquel and haldol prn  - off propofol   - s/p extubation with ENT, no stridor  - haldol 2mg PO BID prn for agitation  -seroquel 100 mg q6    #Pulm: s/p extubation  -etiology of acute on chronic respiratory failure likely multifactorial: tracheitis vs CAP   -s/p vanc, ceftriaxone, and amikacin at home, no clear infiltrates on chest x-ray, DC'd ceftriaxone and azithromycin  -DC'd solumedrol   -prednisone 20 mg once daily started  #ID: not septic, was tachypneic and tachycardic however no clear source  -sputum culture shows gram negative rods  -blood cultures negative    #Cardiology  -with positive troponins 0.07, likely demand, will not repeat  - , wnl  -HTN: hold anti-HTN, not requiring pressors    #GI  - distended bowel loops on Xray abdomen  - 1 large BM s/p enema  - PEG on suction  - rpt Xray abdomen (7/16) showing retained fecal material  - on senna, miralax, lactulose      #Endo  -SSI    #Heme  -anemia: Hgb 10.4, stable at baseline  -monitor H/H, continue to trend  -maintain active type and screen    #FEN  -NS, Replete lytes K>4, Mg>2  - HSQ for DVT prophylaxis

## 2017-07-17 NOTE — PROGRESS NOTE ADULT - SUBJECTIVE AND OBJECTIVE BOX
Overnight Events :FS 80, started on D5NS @ 80. Grunting while breathing. Extubated yesterday.       Subjective: Patient seen and examined at bedside. Grunting, in distress.    [OBJECTIVE]:    Vital Signs:  T(F): , Max: 98.3 (07-17-17 @ 14:03)  HR:  (50 - 72)  BP:  (132/67 - 181/79)  BP(mean):  (90 - 132)  RR:  (19 - 65)  SpO2:  (95% - 100%)  Wt(kg): --  CVP(cm H2O): --  Mode: standby    07-16 @ 07:01  -  07-17 @ 07:00  --------------------------------------------------------  IN: 2930 mL / OUT: 1097 mL / NET: 1833 mL    07-17 @ 07:01  -  07-17 @ 14:44  --------------------------------------------------------  IN: 560 mL / OUT: 380 mL / NET: 180 mL      CAPILLARY BLOOD GLUCOSE  98 (17 Jul 2017 11:00)          Physcial Exam:  T(F): 98.3 (07-17-17 @ 14:03)  HR: 64 (07-17-17 @ 14:00)  BP: 181/79 (07-17-17 @ 14:00)  RR: 38 (07-17-17 @ 14:00)  SpO2: 97% (07-17-17 @ 14:00)  Wt(kg): --    General: grunting, in distress.  HEENT: NC/AT; cloudy R eye  Neck: supple  Respiratory: CTA b/l  Cardiovascular: +S1/S2; RRR  Abdomen: soft, nontender +Distended abdomen; +PEG; +BS  Extremities: WWP, 2+ peripheral pulses b/l; no LE edema  Skin: normal color and turgor; no rash      Medications:  MEDICATIONS  (STANDING):  heparin  Injectable 5000 Unit(s) SubCutaneous every 8 hours  insulin lispro (HumaLOG) corrective regimen sliding scale   SubCutaneous Before meals and at bedtime  dextrose 5%. 1000 milliLiter(s) (50 mL/Hr) IV Continuous <Continuous>  dextrose 50% Injectable 12.5 Gram(s) IV Push once  OXcarbazepine 300 milliGRAM(s) Oral daily  OXcarbazepine 450 milliGRAM(s) Oral at bedtime  gabapentin 300 milliGRAM(s) Oral two times a day  busPIRone 10 milliGRAM(s) Oral daily  buDESOnide   0.5 milliGRAM(s) Respule 0.5 milliGRAM(s) Nebulizer two times a day  senna 2 Tablet(s) Oral at bedtime  polyethylene glycol 3350 17 Gram(s) Oral two times a day  lactulose Syrup 10 Gram(s) Oral two times a day  dextrose 5% + sodium chloride 0.9%. 1000 milliLiter(s) (80 mL/Hr) IV Continuous <Continuous>  pantoprazole   Suspension 40 milliGRAM(s) Oral two times a day before meals  levETIRAcetam  Solution 250 milliGRAM(s) Oral two times a day  predniSONE   Tablet 20 milliGRAM(s) Oral daily  QUEtiapine 100 milliGRAM(s) Oral every 6 hours    MEDICATIONS  (PRN):  dextrose Gel 1 Dose(s) Oral once PRN Blood Glucose LESS THAN 70 milliGRAM(s)/deciliter  glucagon  Injectable 1 milliGRAM(s) IntraMuscular once PRN Glucose LESS THAN 70 milligrams/deciliter  haloperidol     Tablet 2 milliGRAM(s) Oral two times a day PRN agitation  ALBUTerol/ipratropium for Nebulization 3 milliLiter(s) Nebulizer every 6 hours PRN Shortness of Breath and/or Wheezing  acetaminophen   Tablet. 650 milliGRAM(s) Oral every 6 hours PRN Moderate Pain (4 - 6)      Allergies:  Allergies    No Known Allergies    Intolerances        Labs:                        10.4   6.1   )-----------( 128      ( 17 Jul 2017 04:55 )             32.1     07-17    144  |  108  |  17  ----------------------------<  77  4.3   |  24  |  0.50    Ca    8.4      17 Jul 2017 04:55  Phos  2.9     07-17  Mg     2.3     07-17    TPro  6.3  /  Alb  3.3  /  TBili  0.3  /  DBili  x   /  AST  22  /  ALT  29  /  AlkPhos  83  07-17    PTT - ( 16 Jul 2017 05:00 )  PTT:27.5 sec      Radiology and other tests: Reviewed

## 2017-07-18 ENCOUNTER — TRANSCRIPTION ENCOUNTER (OUTPATIENT)
Age: 82
End: 2017-07-18

## 2017-07-18 VITALS
DIASTOLIC BLOOD PRESSURE: 130 MMHG | OXYGEN SATURATION: 96 % | RESPIRATION RATE: 22 BRPM | SYSTOLIC BLOOD PRESSURE: 175 MMHG | HEART RATE: 78 BPM

## 2017-07-18 LAB
-  AMIKACIN: SIGNIFICANT CHANGE UP
-  AMPICILLIN/SULBACTAM: SIGNIFICANT CHANGE UP
-  AMPICILLIN: SIGNIFICANT CHANGE UP
-  CEFAZOLIN: SIGNIFICANT CHANGE UP
-  CEFTRIAXONE: SIGNIFICANT CHANGE UP
-  CIPROFLOXACIN: SIGNIFICANT CHANGE UP
-  GENTAMICIN: SIGNIFICANT CHANGE UP
-  IMIPENEM: SIGNIFICANT CHANGE UP
-  PIPERACILLIN/TAZOBACTAM: SIGNIFICANT CHANGE UP
-  TOBRAMYCIN: SIGNIFICANT CHANGE UP
-  TRIMETHOPRIM/SULFAMETHOXAZOLE: SIGNIFICANT CHANGE UP
ANION GAP SERPL CALC-SCNC: 12 MMOL/L — SIGNIFICANT CHANGE UP (ref 5–17)
APTT BLD: 31 SEC — SIGNIFICANT CHANGE UP (ref 27.5–37.4)
BUN SERPL-MCNC: 9 MG/DL — SIGNIFICANT CHANGE UP (ref 7–23)
CALCIUM SERPL-MCNC: 8.5 MG/DL — SIGNIFICANT CHANGE UP (ref 8.4–10.5)
CHLORIDE SERPL-SCNC: 107 MMOL/L — SIGNIFICANT CHANGE UP (ref 96–108)
CO2 SERPL-SCNC: 24 MMOL/L — SIGNIFICANT CHANGE UP (ref 22–31)
CREAT SERPL-MCNC: 0.5 MG/DL — SIGNIFICANT CHANGE UP (ref 0.5–1.3)
CULTURE RESULTS: SIGNIFICANT CHANGE UP
GLUCOSE SERPL-MCNC: 109 MG/DL — HIGH (ref 70–99)
HCT VFR BLD CALC: 34.3 % — LOW (ref 39–50)
HGB BLD-MCNC: 11.3 G/DL — LOW (ref 13–17)
MAGNESIUM SERPL-MCNC: 2.2 MG/DL — SIGNIFICANT CHANGE UP (ref 1.6–2.6)
MCHC RBC-ENTMCNC: 30.4 PG — SIGNIFICANT CHANGE UP (ref 27–34)
MCHC RBC-ENTMCNC: 32.9 G/DL — SIGNIFICANT CHANGE UP (ref 32–36)
MCV RBC AUTO: 92.2 FL — SIGNIFICANT CHANGE UP (ref 80–100)
METHOD TYPE: SIGNIFICANT CHANGE UP
ORGANISM # SPEC MICROSCOPIC CNT: SIGNIFICANT CHANGE UP
ORGANISM # SPEC MICROSCOPIC CNT: SIGNIFICANT CHANGE UP
PLATELET # BLD AUTO: 121 K/UL — LOW (ref 150–400)
POTASSIUM SERPL-MCNC: 4.1 MMOL/L — SIGNIFICANT CHANGE UP (ref 3.5–5.3)
POTASSIUM SERPL-SCNC: 4.1 MMOL/L — SIGNIFICANT CHANGE UP (ref 3.5–5.3)
RBC # BLD: 3.72 M/UL — LOW (ref 4.2–5.8)
RBC # FLD: 14.7 % — SIGNIFICANT CHANGE UP (ref 10.3–16.9)
SODIUM SERPL-SCNC: 143 MMOL/L — SIGNIFICANT CHANGE UP (ref 135–145)
SPECIMEN SOURCE: SIGNIFICANT CHANGE UP
WBC # BLD: 4.3 K/UL — SIGNIFICANT CHANGE UP (ref 3.8–10.5)
WBC # FLD AUTO: 4.3 K/UL — SIGNIFICANT CHANGE UP (ref 3.8–10.5)

## 2017-07-18 PROCEDURE — 93005 ELECTROCARDIOGRAM TRACING: CPT

## 2017-07-18 PROCEDURE — 31500 INSERT EMERGENCY AIRWAY: CPT

## 2017-07-18 PROCEDURE — 94640 AIRWAY INHALATION TREATMENT: CPT

## 2017-07-18 PROCEDURE — 87798 DETECT AGENT NOS DNA AMP: CPT

## 2017-07-18 PROCEDURE — 94003 VENT MGMT INPAT SUBQ DAY: CPT

## 2017-07-18 PROCEDURE — 93306 TTE W/DOPPLER COMPLETE: CPT

## 2017-07-18 PROCEDURE — 84100 ASSAY OF PHOSPHORUS: CPT

## 2017-07-18 PROCEDURE — 83735 ASSAY OF MAGNESIUM: CPT

## 2017-07-18 PROCEDURE — 82803 BLOOD GASES ANY COMBINATION: CPT

## 2017-07-18 PROCEDURE — 85730 THROMBOPLASTIN TIME PARTIAL: CPT

## 2017-07-18 PROCEDURE — 85025 COMPLETE CBC W/AUTO DIFF WBC: CPT

## 2017-07-18 PROCEDURE — 99291 CRITICAL CARE FIRST HOUR: CPT | Mod: 25

## 2017-07-18 PROCEDURE — 74018 RADEX ABDOMEN 1 VIEW: CPT

## 2017-07-18 PROCEDURE — 85027 COMPLETE CBC AUTOMATED: CPT

## 2017-07-18 PROCEDURE — 82553 CREATINE MB FRACTION: CPT

## 2017-07-18 PROCEDURE — 94002 VENT MGMT INPAT INIT DAY: CPT

## 2017-07-18 PROCEDURE — 84484 ASSAY OF TROPONIN QUANT: CPT

## 2017-07-18 PROCEDURE — 80048 BASIC METABOLIC PNL TOTAL CA: CPT

## 2017-07-18 PROCEDURE — 87581 M.PNEUMON DNA AMP PROBE: CPT

## 2017-07-18 PROCEDURE — 87633 RESP VIRUS 12-25 TARGETS: CPT

## 2017-07-18 PROCEDURE — 87070 CULTURE OTHR SPECIMN AEROBIC: CPT

## 2017-07-18 PROCEDURE — 80053 COMPREHEN METABOLIC PANEL: CPT

## 2017-07-18 PROCEDURE — 87040 BLOOD CULTURE FOR BACTERIA: CPT

## 2017-07-18 PROCEDURE — 83605 ASSAY OF LACTIC ACID: CPT

## 2017-07-18 PROCEDURE — 83880 ASSAY OF NATRIURETIC PEPTIDE: CPT

## 2017-07-18 PROCEDURE — 87186 SC STD MICRODIL/AGAR DIL: CPT

## 2017-07-18 PROCEDURE — 71045 X-RAY EXAM CHEST 1 VIEW: CPT

## 2017-07-18 PROCEDURE — 84145 PROCALCITONIN (PCT): CPT

## 2017-07-18 PROCEDURE — 87486 CHLMYD PNEUM DNA AMP PROBE: CPT

## 2017-07-18 PROCEDURE — 85610 PROTHROMBIN TIME: CPT

## 2017-07-18 PROCEDURE — 36415 COLL VENOUS BLD VENIPUNCTURE: CPT

## 2017-07-18 PROCEDURE — 82550 ASSAY OF CK (CPK): CPT

## 2017-07-18 RX ORDER — HALOPERIDOL DECANOATE 100 MG/ML
2 INJECTION INTRAMUSCULAR ONCE
Qty: 0 | Refills: 0 | Status: COMPLETED | OUTPATIENT
Start: 2017-07-18 | End: 2017-07-18

## 2017-07-18 RX ADMIN — LACTULOSE 10 GRAM(S): 10 SOLUTION ORAL at 05:03

## 2017-07-18 RX ADMIN — HALOPERIDOL DECANOATE 2 MILLIGRAM(S): 100 INJECTION INTRAMUSCULAR at 03:45

## 2017-07-18 RX ADMIN — Medication 10 MILLIGRAM(S): at 11:43

## 2017-07-18 RX ADMIN — SODIUM CHLORIDE 80 MILLILITER(S): 9 INJECTION, SOLUTION INTRAVENOUS at 07:31

## 2017-07-18 RX ADMIN — GABAPENTIN 300 MILLIGRAM(S): 400 CAPSULE ORAL at 05:02

## 2017-07-18 RX ADMIN — LOSARTAN POTASSIUM 50 MILLIGRAM(S): 100 TABLET, FILM COATED ORAL at 01:44

## 2017-07-18 RX ADMIN — PANTOPRAZOLE SODIUM 40 MILLIGRAM(S): 20 TABLET, DELAYED RELEASE ORAL at 06:25

## 2017-07-18 RX ADMIN — LEVETIRACETAM 250 MILLIGRAM(S): 250 TABLET, FILM COATED ORAL at 06:24

## 2017-07-18 RX ADMIN — HEPARIN SODIUM 5000 UNIT(S): 5000 INJECTION INTRAVENOUS; SUBCUTANEOUS at 05:03

## 2017-07-18 RX ADMIN — POLYETHYLENE GLYCOL 3350 17 GRAM(S): 17 POWDER, FOR SOLUTION ORAL at 05:02

## 2017-07-18 RX ADMIN — QUETIAPINE FUMARATE 150 MILLIGRAM(S): 200 TABLET, FILM COATED ORAL at 11:44

## 2017-07-18 RX ADMIN — QUETIAPINE FUMARATE 150 MILLIGRAM(S): 200 TABLET, FILM COATED ORAL at 04:44

## 2017-07-18 RX ADMIN — Medication 20 MILLIGRAM(S): at 05:02

## 2017-07-18 RX ADMIN — Medication 0.5 MILLIGRAM(S): at 06:14

## 2017-07-18 RX ADMIN — OXCARBAZEPINE 300 MILLIGRAM(S): 300 TABLET, FILM COATED ORAL at 11:43

## 2017-07-18 RX ADMIN — Medication 3 MILLILITER(S): at 03:16

## 2017-07-18 NOTE — DISCHARGE NOTE ADULT - MEDICATION SUMMARY - MEDICATIONS TO STOP TAKING
I will STOP taking the medications listed below when I get home from the hospital:  None I will STOP taking the medications listed below when I get home from the hospital:    SEROquel 25 mg oral tablet  -- 1 tab(s) by mouth 3 times a day

## 2017-07-18 NOTE — DISCHARGE NOTE ADULT - CARE PROVIDER_API CALL
Veronica Yuen), Critical Care Medicine; Pulmonary Disease  [address] [place] [address]  Phone: [phone]  Fax: [phone]

## 2017-07-18 NOTE — DISCHARGE NOTE ADULT - PATIENT PORTAL LINK FT
“You can access the [employer] Patient Portal, offered by [facility], by registering with the following website: [email or web address]”

## 2017-07-18 NOTE — DISCHARGE NOTE ADULT - HOSPITAL COURSE
89 year old bedbound M w/ PMH of Alzheimer's dementia, HTN, CAD, CVA w/ left sided deficits, GI bleed, aspiration pneumonitis, epilepsy, w/ PEG, presented to [facility] ED on 7/14/2017 with respiratory distress and altered mental status. At baseline, patient is AAOx3 and prays everyday. Per family (Stephane Turpin), patient was not feeling well for the last few days with increasing respiratory distress and shortness of breath. He was suctioned at home with increased sputum production. They called their PMD who ordered a chest x-ray which showed bilateral infiltrates. Patient did not desaturate or have fevers. At home, peripheral IV lines were placed and his PMD ordered 1g vancomycin, 2 g ceftriaxone, and 250 mg of amikacin. He was also given solumedrol 125 mg and lasix 80 mg. Upon presentation to the ED, T 97.9, HR 92, /83, RR 24, Sat 98%. Patient was found to have increasing respiratory distress, was not able to protect his airway, had stridor and was intubated for airway protection. Per family, patient has an acute respiratory distress episode every 2 weeks, presumed to be stridor from deviated trachea (per family), and is given atropine and solumedrol with relief of symptoms. In the ED, he was given a 1L bolus and started on propofol. Patient was extubated on 7/16 with ENT present at bedside, w/no abnormalities noted on exam and no stridor post extubation. Patient has been grunting since the morning of 7/17 and seems to have shallow breathing but according to the family this is his baseline condition and they would like to take him home.

## 2017-07-18 NOTE — DISCHARGE NOTE ADULT - MEDICATION SUMMARY - MEDICATIONS TO TAKE
I will START or STAY ON the medications listed below when I get home from the hospital:    predniSONE 20 mg oral tablet  -- 1 tab(s) by mouth once a day  -- Indication: For RESPIRATORY DISTRESS    budesonide 3 mg oral capsule, extended release  -- 2 cap(s) by mouth once a day  -- Indication: For RESPIRATORY DISTRESS    losartan 50 mg oral tablet  -- 1 tab(s) by mouth once a day  -- Indication: For HYPERTENSION    Trileptal 150 mg oral tablet  -- 1 tab(s) by mouth once a day (at bedtime)  -- Indication: For EPILEPSY    Trileptal 300 mg oral tablet  -- 1 tab(s) by mouth 2 times a day  -- Indication: For EPILEPSY    Keppra 250 mg oral tablet  -- 1 tab(s) by mouth 2 times a day  -- Indication: For EPILEPSY    levETIRAcetam 100 mg/mL oral solution  -- 1.25 milliliter(s) by mouth 2 times a day  -- Indication: For EPILEPSY    OXcarbazepine 300 mg oral tablet  -- 1 tab(s) by mouth 2 times a day  -- Indication: For EPILEPSY    gabapentin 300 mg oral capsule  -- 300 milligram(s) by mouth 2 times a day  -- Indication: For EPILEPSY    CeleXA 20 mg oral tablet  -- 1 tab(s) by mouth once a day  -- Indication: For DEPRESSION    Lipitor 40 mg oral tablet  -- 1 tab(s) by mouth once a day (at bedtime)  -- Indication: For HYPERLIPIDEMIA    SEROquel 25 mg oral tablet  -- 1 tab(s) by mouth 3 times a day  -- Indication: For PSYCHOSIS    haloperidol 2 mg oral tablet  -- 1 tab(s) by mouth 2 times a day  -- Indication: For PSYCHOSIS    SEROquel  -- 2 tab(s) by gastrostomy tube once a day (at bedtime)  -- Indication: For PSYCHOSIS    busPIRone 10 mg oral tablet  -- 10 milligram(s) by mouth once a day  -- Indication: For ANXIETY    levalbuterol 0.31 mg/3 mL inhalation solution  -- 3 milliliter(s) inhaled 3 times a day  -- Indication: For RESPIRATORY DISTRESS    ipratropium 500 mcg/2.5 mL inhalation solution  -- 2.5 milliliter(s) inhaled 3 times a day  -- Indication: For RESPIRATORY DISTRESS    Lasix 20 mg oral tablet  -- 20 milligram(s) by mouth 2 times a day  -- Indication: For HYPERTENSION    Florastor 250 mg oral capsule  -- 1 cap(s) by mouth once a day  -- Indication: For GASTROINTESTINAL MOTILITY    pantoprazole  -- 40 milligram(s) enteral 2 times a day  -- Indication: For GERD I will START or STAY ON the medications listed below when I get home from the hospital:    SEROquel 150 mg oral tablet  -- 150 milligram(s) by gastrostomy tube every 8 hours  -- Indication: For PSYCHOSIS    predniSONE 20 mg oral tablet  -- 1 tab(s) by mouth once a day  -- Indication: For RESPIRATORY DISTRESS    budesonide 3 mg oral capsule, extended release  -- 2 cap(s) by mouth once a day  -- Indication: For RESPIRATORY DISTRESS    losartan 50 mg oral tablet  -- 1 tab(s) by mouth once a day  -- Indication: For HYPERTENSION    levETIRAcetam 100 mg/mL oral solution  -- 1.25 milliliter(s) by mouth 2 times a day  -- Indication: For EPILEPSY    OXcarbazepine 300 mg oral tablet  -- 1 tab(s) by mouth 2 times a day  -- Indication: For EPILEPSY    Trileptal 150 mg oral tablet  -- 1 tab(s) by mouth once a day (at bedtime)  -- Indication: For EPILEPSY    Trileptal 300 mg oral tablet  -- 1 tab(s) by mouth 2 times a day  -- Indication: For EPILEPSY    Keppra 250 mg oral tablet  -- 1 tab(s) by mouth 2 times a day  -- Indication: For EPILEPSY    gabapentin 300 mg oral capsule  -- 300 milligram(s) by mouth 2 times a day  -- Indication: For EPILEPSY    CeleXA 20 mg oral tablet  -- 1 tab(s) by mouth once a day  -- Indication: For DEPRESSION    Lipitor 40 mg oral tablet  -- 1 tab(s) by mouth once a day (at bedtime)  -- Indication: For HYPERLIPIDEMIA    haloperidol 2 mg oral tablet  -- 1 tab(s) by mouth 2 times a day  -- Indication: For PSYCHOSIS    SEROquel  -- 2 tab(s) by gastrostomy tube once a day (at bedtime)  -- Indication: For PSYCHOSIS    busPIRone 10 mg oral tablet  -- 10 milligram(s) by mouth once a day  -- Indication: For ANXIETY    levalbuterol 0.31 mg/3 mL inhalation solution  -- 3 milliliter(s) inhaled 3 times a day  -- Indication: For RESPIRATORY DISTRESS    ipratropium 500 mcg/2.5 mL inhalation solution  -- 2.5 milliliter(s) inhaled 3 times a day  -- Indication: For RESPIRATORY DISTRESS    Lasix 20 mg oral tablet  -- 20 milligram(s) by mouth 2 times a day  -- Indication: For HYPERTENSION    Florastor 250 mg oral capsule  -- 1 cap(s) by mouth once a day  -- Indication: For GASTROINTESTINAL MOTILITY    pantoprazole  -- 40 milligram(s) enteral 2 times a day  -- Indication: For GERD

## 2017-07-18 NOTE — DISCHARGE NOTE ADULT - PLAN OF CARE
To achieve better control of your respiratory status - -You presented to the ED with respiratory distress and were intubated for airway protection  -According to your family you get episodes of respiratory distress every 2 weeks  -You were extubated of 7/16 with ENT present at bedside during extubation and no abnormalities were noted  -Please follow up with your pulmonologist for further management  -In case of any respiratory distress please come to the ED for evaluation

## 2017-07-18 NOTE — DISCHARGE NOTE ADULT - CARE PLAN
Principal Discharge DX:	Respiratory distress  Goal:	To achieve better control of your respiratory status  Instructions for follow-up, activity and diet:	- Principal Discharge DX:	Respiratory distress  Goal:	To achieve better control of your respiratory status  Instructions for follow-up, activity and diet:	-You presented to the ED with respiratory distress and were intubated for airway protection  -According to your family you get episodes of respiratory distress every 2 weeks  -You were extubated of 7/16 with ENT present at bedside during extubation and no abnormalities were noted  -Please follow up with your pulmonologist for further management  -In case of any respiratory distress please come to the ED for evaluation

## 2017-07-20 DIAGNOSIS — K56.41 FECAL IMPACTION: ICD-10-CM

## 2017-07-20 DIAGNOSIS — G30.9 ALZHEIMER'S DISEASE, UNSPECIFIED: ICD-10-CM

## 2017-07-20 DIAGNOSIS — Z93.1 GASTROSTOMY STATUS: ICD-10-CM

## 2017-07-20 DIAGNOSIS — G40.909 EPILEPSY, UNSPECIFIED, NOT INTRACTABLE, WITHOUT STATUS EPILEPTICUS: ICD-10-CM

## 2017-07-20 DIAGNOSIS — H54.8 LEGAL BLINDNESS, AS DEFINED IN USA: ICD-10-CM

## 2017-07-20 DIAGNOSIS — N40.0 BENIGN PROSTATIC HYPERPLASIA WITHOUT LOWER URINARY TRACT SYMPTOMS: ICD-10-CM

## 2017-07-20 DIAGNOSIS — J96.20 ACUTE AND CHRONIC RESPIRATORY FAILURE, UNSPECIFIED WHETHER WITH HYPOXIA OR HYPERCAPNIA: ICD-10-CM

## 2017-07-20 DIAGNOSIS — J40 BRONCHITIS, NOT SPECIFIED AS ACUTE OR CHRONIC: ICD-10-CM

## 2017-07-20 DIAGNOSIS — I69.354 HEMIPLEGIA AND HEMIPARESIS FOLLOWING CEREBRAL INFARCTION AFFECTING LEFT NON-DOMINANT SIDE: ICD-10-CM

## 2017-07-20 DIAGNOSIS — R06.02 SHORTNESS OF BREATH: ICD-10-CM

## 2017-07-20 DIAGNOSIS — I10 ESSENTIAL (PRIMARY) HYPERTENSION: ICD-10-CM

## 2017-07-20 DIAGNOSIS — F02.80 DEMENTIA IN OTHER DISEASES CLASSIFIED ELSEWHERE, UNSPECIFIED SEVERITY, WITHOUT BEHAVIORAL DISTURBANCE, PSYCHOTIC DISTURBANCE, MOOD DISTURBANCE, AND ANXIETY: ICD-10-CM

## 2017-07-20 DIAGNOSIS — I25.10 ATHEROSCLEROTIC HEART DISEASE OF NATIVE CORONARY ARTERY WITHOUT ANGINA PECTORIS: ICD-10-CM

## 2017-08-16 ENCOUNTER — INPATIENT (INPATIENT)
Facility: HOSPITAL | Age: 82
LOS: 7 days | Discharge: HOME HEALTH | DRG: 870 | End: 2017-08-24
Payer: MEDICARE

## 2017-08-16 VITALS
HEART RATE: 98 BPM | TEMPERATURE: 99 F | OXYGEN SATURATION: 95 % | DIASTOLIC BLOOD PRESSURE: 61 MMHG | SYSTOLIC BLOOD PRESSURE: 98 MMHG | RESPIRATION RATE: 28 BRPM

## 2017-08-16 DIAGNOSIS — R73.9 HYPERGLYCEMIA, UNSPECIFIED: ICD-10-CM

## 2017-08-16 DIAGNOSIS — J96.01 ACUTE RESPIRATORY FAILURE WITH HYPOXIA: ICD-10-CM

## 2017-08-16 DIAGNOSIS — E87.0 HYPEROSMOLALITY AND HYPERNATREMIA: ICD-10-CM

## 2017-08-16 DIAGNOSIS — Z98.89 OTHER SPECIFIED POSTPROCEDURAL STATES: Chronic | ICD-10-CM

## 2017-08-16 DIAGNOSIS — Z93.1 GASTROSTOMY STATUS: Chronic | ICD-10-CM

## 2017-08-16 DIAGNOSIS — A41.9 SEPSIS, UNSPECIFIED ORGANISM: ICD-10-CM

## 2017-08-16 LAB
ALBUMIN SERPL ELPH-MCNC: 3.2 G/DL — LOW (ref 3.3–5)
ALP SERPL-CCNC: 164 U/L — HIGH (ref 40–120)
ALT FLD-CCNC: 86 U/L — HIGH (ref 10–45)
ANION GAP SERPL CALC-SCNC: 12 MMOL/L — SIGNIFICANT CHANGE UP (ref 5–17)
APPEARANCE UR: (no result)
APPEARANCE UR: CLEAR — SIGNIFICANT CHANGE UP
APPEARANCE UR: CLEAR — SIGNIFICANT CHANGE UP
APTT BLD: 26.7 SEC — LOW (ref 27.5–37.4)
AST SERPL-CCNC: 40 U/L — SIGNIFICANT CHANGE UP (ref 10–40)
BACTERIA # UR AUTO: PRESENT /HPF
BASE EXCESS BLDV CALC-SCNC: 17.6 MMOL/L — SIGNIFICANT CHANGE UP
BASOPHILS NFR BLD AUTO: 0 % — SIGNIFICANT CHANGE UP (ref 0–2)
BILIRUB SERPL-MCNC: 0.3 MG/DL — SIGNIFICANT CHANGE UP (ref 0.2–1.2)
BILIRUB UR-MCNC: NEGATIVE — SIGNIFICANT CHANGE UP
BUN SERPL-MCNC: 71 MG/DL — HIGH (ref 7–23)
CALCIUM SERPL-MCNC: 9.7 MG/DL — SIGNIFICANT CHANGE UP (ref 8.4–10.5)
CHLORIDE SERPL-SCNC: 95 MMOL/L — LOW (ref 96–108)
CHLORIDE UR-SCNC: 41 MMOL/L — SIGNIFICANT CHANGE UP
CK MB CFR SERPL CALC: 1.2 NG/ML — SIGNIFICANT CHANGE UP (ref 0–6.7)
CK SERPL-CCNC: 23 U/L — LOW (ref 30–200)
CO2 SERPL-SCNC: 44 MMOL/L — HIGH (ref 22–31)
COLOR SPEC: YELLOW — SIGNIFICANT CHANGE UP
COMMENT - URINE: SIGNIFICANT CHANGE UP
COMMENT - URINE: SIGNIFICANT CHANGE UP
CREAT SERPL-MCNC: 1.1 MG/DL — SIGNIFICANT CHANGE UP (ref 0.5–1.3)
DIFF PNL FLD: (no result)
EOSINOPHIL NFR BLD AUTO: 0 % — SIGNIFICANT CHANGE UP (ref 0–6)
EPI CELLS # UR: SIGNIFICANT CHANGE UP /HPF
GAS PNL BLDV: SIGNIFICANT CHANGE UP
GAS PNL BLDV: SIGNIFICANT CHANGE UP
GLUCOSE SERPL-MCNC: 658 MG/DL — CRITICAL HIGH (ref 70–99)
GLUCOSE UR QL: >=1000
HCO3 BLDV-SCNC: 44 MMOL/L — HIGH (ref 20–27)
HCT VFR BLD CALC: 36 % — LOW (ref 39–50)
HGB BLD-MCNC: 11.1 G/DL — LOW (ref 13–17)
INR BLD: 1.1 — SIGNIFICANT CHANGE UP (ref 0.88–1.16)
KETONES UR-MCNC: NEGATIVE — SIGNIFICANT CHANGE UP
LACTATE SERPL-SCNC: 3.5 MMOL/L — HIGH (ref 0.5–2)
LEUKOCYTE ESTERASE UR-ACNC: (no result)
LYMPHOCYTES # BLD AUTO: 6.7 % — LOW (ref 13–44)
MCHC RBC-ENTMCNC: 29.9 PG — SIGNIFICANT CHANGE UP (ref 27–34)
MCHC RBC-ENTMCNC: 30.8 G/DL — LOW (ref 32–36)
MCV RBC AUTO: 97 FL — SIGNIFICANT CHANGE UP (ref 80–100)
MONOCYTES NFR BLD AUTO: 2.2 % — SIGNIFICANT CHANGE UP (ref 2–14)
NEUTROPHILS NFR BLD AUTO: 91.1 % — HIGH (ref 43–77)
NITRITE UR-MCNC: NEGATIVE — SIGNIFICANT CHANGE UP
OSMOLALITY UR: 451 MOSMOL/KG — SIGNIFICANT CHANGE UP (ref 100–650)
PCO2 BLDV: 67 MMHG — HIGH (ref 41–51)
PH BLDV: 7.44 — HIGH (ref 7.32–7.43)
PH UR: 6 — SIGNIFICANT CHANGE UP (ref 5–8)
PLATELET # BLD AUTO: 144 K/UL — LOW (ref 150–400)
PO2 BLDV: 76 MMHG — SIGNIFICANT CHANGE UP
POTASSIUM SERPL-MCNC: 3.4 MMOL/L — LOW (ref 3.5–5.3)
POTASSIUM SERPL-SCNC: 3.4 MMOL/L — LOW (ref 3.5–5.3)
POTASSIUM UR-SCNC: 25 MMOL/L — SIGNIFICANT CHANGE UP
PROT SERPL-MCNC: 7.6 G/DL — SIGNIFICANT CHANGE UP (ref 6–8.3)
PROT UR-MCNC: (no result) MG/DL
PROT UR-MCNC: (no result) MG/DL
PROT UR-MCNC: 30 MG/DL
PROTHROM AB SERPL-ACNC: 12.2 SEC — SIGNIFICANT CHANGE UP (ref 9.8–12.7)
RBC # BLD: 3.71 M/UL — LOW (ref 4.2–5.8)
RBC # FLD: 14.4 % — SIGNIFICANT CHANGE UP (ref 10.3–16.9)
RBC CASTS # UR COMP ASSIST: (no result) /HPF
SAO2 % BLDV: 95 % — SIGNIFICANT CHANGE UP
SODIUM SERPL-SCNC: 151 MMOL/L — HIGH (ref 135–145)
SODIUM UR-SCNC: 38 MMOL/L — SIGNIFICANT CHANGE UP
SP GR SPEC: <=1.005 — SIGNIFICANT CHANGE UP (ref 1–1.03)
TROPONIN T SERPL-MCNC: 0.12 NG/ML — CRITICAL HIGH (ref 0–0.01)
UROBILINOGEN FLD QL: 0.2 E.U./DL — SIGNIFICANT CHANGE UP
WBC # BLD: 8 K/UL — SIGNIFICANT CHANGE UP (ref 3.8–10.5)
WBC # FLD AUTO: 8 K/UL — SIGNIFICANT CHANGE UP (ref 3.8–10.5)
WBC UR QL: > 10 /HPF

## 2017-08-16 PROCEDURE — 99291 CRITICAL CARE FIRST HOUR: CPT | Mod: 25

## 2017-08-16 PROCEDURE — 99291 CRITICAL CARE FIRST HOUR: CPT

## 2017-08-16 PROCEDURE — 71010: CPT | Mod: 26

## 2017-08-16 PROCEDURE — 99292 CRITICAL CARE ADDL 30 MIN: CPT | Mod: 25

## 2017-08-16 PROCEDURE — 93010 ELECTROCARDIOGRAM REPORT: CPT | Mod: 59

## 2017-08-16 PROCEDURE — 31500 INSERT EMERGENCY AIRWAY: CPT

## 2017-08-16 RX ORDER — INSULIN HUMAN 100 [IU]/ML
7 INJECTION, SOLUTION SUBCUTANEOUS
Qty: 100 | Refills: 0 | Status: DISCONTINUED | OUTPATIENT
Start: 2017-08-16 | End: 2017-08-17

## 2017-08-16 RX ORDER — CITALOPRAM 10 MG/1
20 TABLET, FILM COATED ORAL DAILY
Qty: 0 | Refills: 0 | Status: DISCONTINUED | OUTPATIENT
Start: 2017-08-16 | End: 2017-08-19

## 2017-08-16 RX ORDER — GABAPENTIN 400 MG/1
300 CAPSULE ORAL
Qty: 0 | Refills: 0 | Status: DISCONTINUED | OUTPATIENT
Start: 2017-08-16 | End: 2017-08-24

## 2017-08-16 RX ORDER — ATORVASTATIN CALCIUM 80 MG/1
40 TABLET, FILM COATED ORAL AT BEDTIME
Qty: 0 | Refills: 0 | Status: DISCONTINUED | OUTPATIENT
Start: 2017-08-16 | End: 2017-08-24

## 2017-08-16 RX ORDER — SODIUM CHLORIDE 9 MG/ML
1000 INJECTION INTRAMUSCULAR; INTRAVENOUS; SUBCUTANEOUS ONCE
Qty: 0 | Refills: 0 | Status: COMPLETED | OUTPATIENT
Start: 2017-08-16 | End: 2017-08-16

## 2017-08-16 RX ORDER — VANCOMYCIN HCL 1 G
1000 VIAL (EA) INTRAVENOUS EVERY 24 HOURS
Qty: 0 | Refills: 0 | Status: DISCONTINUED | OUTPATIENT
Start: 2017-08-17 | End: 2017-08-18

## 2017-08-16 RX ORDER — OXCARBAZEPINE 300 MG/1
300 TABLET, FILM COATED ORAL
Qty: 0 | Refills: 0 | Status: DISCONTINUED | OUTPATIENT
Start: 2017-08-16 | End: 2017-08-17

## 2017-08-16 RX ORDER — CHLORHEXIDINE GLUCONATE 213 G/1000ML
15 SOLUTION TOPICAL
Qty: 0 | Refills: 0 | Status: DISCONTINUED | OUTPATIENT
Start: 2017-08-16 | End: 2017-08-24

## 2017-08-16 RX ORDER — LEVETIRACETAM 250 MG/1
125 TABLET, FILM COATED ORAL
Qty: 0 | Refills: 0 | Status: DISCONTINUED | OUTPATIENT
Start: 2017-08-16 | End: 2017-08-17

## 2017-08-16 RX ORDER — IMIPENEM AND CILASTATIN 250; 250 MG/100ML; MG/100ML
300 INJECTION, POWDER, FOR SOLUTION INTRAVENOUS EVERY 6 HOURS
Qty: 0 | Refills: 0 | Status: DISCONTINUED | OUTPATIENT
Start: 2017-08-17 | End: 2017-08-17

## 2017-08-16 RX ORDER — QUETIAPINE FUMARATE 200 MG/1
100 TABLET, FILM COATED ORAL EVERY 8 HOURS
Qty: 0 | Refills: 0 | Status: DISCONTINUED | OUTPATIENT
Start: 2017-08-16 | End: 2017-08-17

## 2017-08-16 RX ORDER — PROPOFOL 10 MG/ML
5 INJECTION, EMULSION INTRAVENOUS
Qty: 500 | Refills: 0 | Status: DISCONTINUED | OUTPATIENT
Start: 2017-08-16 | End: 2017-08-17

## 2017-08-16 RX ORDER — IPRATROPIUM/ALBUTEROL SULFATE 18-103MCG
3 AEROSOL WITH ADAPTER (GRAM) INHALATION EVERY 4 HOURS
Qty: 0 | Refills: 0 | Status: DISCONTINUED | OUTPATIENT
Start: 2017-08-16 | End: 2017-08-24

## 2017-08-16 RX ORDER — PANTOPRAZOLE SODIUM 20 MG/1
40 TABLET, DELAYED RELEASE ORAL DAILY
Qty: 0 | Refills: 0 | Status: DISCONTINUED | OUTPATIENT
Start: 2017-08-16 | End: 2017-08-23

## 2017-08-16 RX ORDER — SODIUM CHLORIDE 9 MG/ML
1000 INJECTION, SOLUTION INTRAVENOUS
Qty: 0 | Refills: 0 | Status: DISCONTINUED | OUTPATIENT
Start: 2017-08-16 | End: 2017-08-17

## 2017-08-16 RX ORDER — ETOMIDATE 2 MG/ML
20 INJECTION INTRAVENOUS ONCE
Qty: 0 | Refills: 0 | Status: COMPLETED | OUTPATIENT
Start: 2017-08-16 | End: 2017-08-16

## 2017-08-16 RX ORDER — IMIPENEM AND CILASTATIN 250; 250 MG/100ML; MG/100ML
500 INJECTION, POWDER, FOR SOLUTION INTRAVENOUS ONCE
Qty: 0 | Refills: 0 | Status: COMPLETED | OUTPATIENT
Start: 2017-08-16 | End: 2017-08-16

## 2017-08-16 RX ORDER — LEVETIRACETAM 250 MG/1
250 TABLET, FILM COATED ORAL
Qty: 0 | Refills: 0 | Status: DISCONTINUED | OUTPATIENT
Start: 2017-08-16 | End: 2017-08-17

## 2017-08-16 RX ORDER — HALOPERIDOL DECANOATE 100 MG/ML
2 INJECTION INTRAMUSCULAR
Qty: 0 | Refills: 0 | Status: DISCONTINUED | OUTPATIENT
Start: 2017-08-16 | End: 2017-08-21

## 2017-08-16 RX ORDER — INSULIN HUMAN 100 [IU]/ML
8 INJECTION, SOLUTION SUBCUTANEOUS ONCE
Qty: 0 | Refills: 0 | Status: COMPLETED | OUTPATIENT
Start: 2017-08-16 | End: 2017-08-16

## 2017-08-16 RX ORDER — VANCOMYCIN HCL 1 G
1000 VIAL (EA) INTRAVENOUS ONCE
Qty: 0 | Refills: 0 | Status: COMPLETED | OUTPATIENT
Start: 2017-08-16 | End: 2017-08-16

## 2017-08-16 RX ADMIN — ETOMIDATE 20 MILLIGRAM(S): 2 INJECTION INTRAVENOUS at 19:45

## 2017-08-16 RX ADMIN — PROPOFOL 2.27 MICROGRAM(S)/KG/MIN: 10 INJECTION, EMULSION INTRAVENOUS at 21:14

## 2017-08-16 RX ADMIN — INSULIN HUMAN 7 UNIT(S)/HR: 100 INJECTION, SOLUTION SUBCUTANEOUS at 23:49

## 2017-08-16 RX ADMIN — SODIUM CHLORIDE 1000 MILLILITER(S): 9 INJECTION INTRAMUSCULAR; INTRAVENOUS; SUBCUTANEOUS at 20:15

## 2017-08-16 RX ADMIN — SODIUM CHLORIDE 125 MILLILITER(S): 9 INJECTION, SOLUTION INTRAVENOUS at 21:09

## 2017-08-16 RX ADMIN — INSULIN HUMAN 8 UNIT(S): 100 INJECTION, SOLUTION SUBCUTANEOUS at 20:55

## 2017-08-16 RX ADMIN — IMIPENEM AND CILASTATIN 100 MILLIGRAM(S): 250; 250 INJECTION, POWDER, FOR SOLUTION INTRAVENOUS at 21:29

## 2017-08-16 RX ADMIN — Medication 250 MILLIGRAM(S): at 20:22

## 2017-08-16 RX ADMIN — SODIUM CHLORIDE 1000 MILLILITER(S): 9 INJECTION INTRAMUSCULAR; INTRAVENOUS; SUBCUTANEOUS at 20:00

## 2017-08-16 NOTE — H&P ADULT - ASSESSMENT
Patient is a 89 yo M with PMH of Alzheimer's disease, Epilepsy, Aspiration Pneumonitis now s/p PEG placement, CVA with L sided deficits, GIB, HTN, CAD, who presented with respiratory distress, fevers and AMS, noted to be in respiratory distress in ED, now s/p intubation for airway protection, with underlying etiology likely secondary to Pneumonia. Patient admitted to MICU for continued management. Patient is a 89 yo M with PMH of Alzheimer's disease, Epilepsy, Aspiration Pneumonitis now s/p PEG placement, CVA with L sided deficits, GIB, HTN, CAD, who presented with respiratory distress, fevers and AMS, noted to be in respiratory distress in ED, now s/p intubation for airway protection, with underlying etiology likely secondary to Pneumonia. Patient admitted to MICU for continued management.     NEURO  #AMS likely sepsis in combination with baseline Alzheimer's/CVA hx. Family reports that at baseline patient is alert and oriented.  CT not performed because of similar episodes that were due to sepeis  -treat underlying infection  #Seizures does not appear to be having seizures  -keppra  -trileptal    CARDIOVASCULAR  #Severe Sepsis 2/2 PNA/UTI, elevated lactate and AMS, afebrile, no leukocytosis.  Otherwise hemodynamically stable  -IVF  -tx infection  -trend lactate  -maintain MAP >65    PULMONARY  #Hypercapnic Respiratory Failure s/p intubation 2/2 inability to protect airway in setting of PNA.  Repeat VBG shows improvement of hypercapnia and oxygenation.    -continue on ventilator until mental status improves  -will need to evaluate ability to to protect airway with questionable hx of tracheal deviation    GASTROINTESTINAL  #Constipation/Diarrhea-patient has been constipated but has been taking miralax and is now experiencing diarrhea   -r/o c.diff    INFECTIOUS DISEASE  #PNA/UTI patient with history of aspiration pneumonitis and ESBL UTI.  CXR shows pleural effusions, urine sent for culture. No leukocytosis or fever but patient AMS with lactate  -f/u blood cx, urine cx  -imipenem because of hx of ESBL  -vancomycin for HAP coverage    ENDOCRINE  #HHS- 2/2 steroid use. Glucose of 685 on admission, BHB, no ketones, slightly alkalotic on blood gas. No diabetes medications listed on home meds.    -insulin drip   -bmp to monitor electrolytes  -IVF hydration    FLUIDS/ELECTROLYTES/NUTRITION  -1/2NS 125cc/h  -Monitor, Replete to K>4 and Mg>2  -NPO  PROPHYLAXIS  -SQH  -Protonix    DISPO MICU  FULL CODE Patient is a 89 yo M with PMH of Alzheimer's disease, Epilepsy, Aspiration Pneumonitis now s/p PEG placement, CVA with L sided deficits, GIB, HTN, CAD, who presented with respiratory distress, fevers and AMS, noted to be in respiratory distress in ED, now s/p intubation for airway protection, with underlying etiology likely secondary to Pneumonia. Patient admitted to MICU for continued management.     NEURO  #AMS likely sepsis in combination with baseline Alzheimer's/CVA hx. Family reports that at baseline patient is alert and oriented.  CT not performed because of similar episodes that were due to sepeis  -treat underlying infection  #Seizure history, does not appear to be having seizures  -keppra  -trileptal    CARDIOVASCULAR  #Severe Sepsis 2/2 PNA/UTI, elevated lactate and AMS, afebrile, no leukocytosis.  Otherwise hemodynamically stable  -IVF  -tx infection  -trend lactate  -maintain MAP >65    PULMONARY  #Hypercapnic Respiratory Failure s/p intubation 2/2 inability to protect airway in setting of PNA.  Repeat VBG shows improvement of hypercapnia and oxygenation.    -continue on ventilator until mental status improves  -will need to evaluate ability to to protect airway with questionable hx of tracheal deviation    GASTROINTESTINAL  #Constipation/Diarrhea-patient has been constipated but has been taking miralax and is now experiencing diarrhea   -r/o c.diff    INFECTIOUS DISEASE  #PNA/UTI patient with history of aspiration pneumonitis and ESBL UTI.  CXR shows pleural effusions, urine sent for culture. No leukocytosis or fever but patient AMS with lactate  -f/u blood cx, urine cx  -imipenem because of hx of ESBL  -vancomycin for HAP coverage    ENDOCRINE  #HHS- 2/2 steroid use. Glucose of 685 on admission, BHB, no ketones, slightly alkalotic on blood gas. No diabetes medications listed on home meds.    -insulin drip   -bmp to monitor electrolytes  -IVF hydration    FLUIDS/ELECTROLYTES/NUTRITION  -1/2NS 125cc/h  -Monitor, Replete to K>4 and Mg>2  -NPO  PROPHYLAXIS  -SQH  -Protonix    DISPO MICU  FULL CODE

## 2017-08-16 NOTE — ED ADULT NURSE REASSESSMENT NOTE - NS ED NURSE REASSESS COMMENT FT1
pt received in resus room from day shift, pt arrived with resp distress and fever x days, wazs intubated by MD Brand with 7.5 ETT 23 at the lip vent settings 12 over 5 o2 80 with inline suction set up. Pt with 18 g PIV to right hand infusing propfol at 2.5ml/hr and 1 L NS infusing on pressure bag 18G to left hand infusing 2nd NS liter on pressure bag. NSR is noted to continuous cardiac monitor. EKG was performed. Pt with PEG tube noted dressing clean dry intact abd soft nontender nondistended. Pt arrived with indwelling serna cath, this was pt received in resus room from day shift, pt arrived with resp distress and fever x days, wazs intubated by MD Brand with 7.5 ETT 23 at the lip vent settings 12 over 5 o2 80 with inline suction set up. Pt with 18 g PIV to right hand infusing propfol at 2.5ml/hr and 1 L NS infusing on pressure bag 18G to left hand infusing 2nd NS liter on pressure bag. Pt right wrist is in a soft wrist restraint as extremity is contracted and there is concern for possible ETT dislodge. NSR is noted to continuous cardiac monitor. EKG was performed. Pt with PEG tube noted dressing clean dry intact abd soft nontender nondistended. Pt arrived with indwelling serna cath with fred though non purulent output noted, this was removed per orders. New 16F serna cath was placed using sterile technique, minimal output of clear yellow urine present(total 50ml 30mins after placement). Pt was rectally afebrile at 99.6 though family at bedside note last giving Tylenol around 4pm today. Upon rectal temp no sacral decubs were noted, but redness still blanchable is present. Pt FS assessed noted at 556, Md israel made aware will medicate per orders monitor and reassess very closely pt received in resus room from day shift, pt arrived with resp distress and fever x days, wazs intubated by MD Brand with 7.5 ETT 23 at the lip vent settings 12 over 5 o2 80 with inline suction set up. Pt with 18 g PIV to right hand infusing propfol at 2.5ml/hr and 1 L NS infusing on pressure bag 18G to left hand infusing 2nd NS liter on pressure bag. Pt right wrist is in a soft wrist restraint as extremity is contracted and there is concern for possible ETT dislodge. NSR is noted to continuous cardiac monitor. EKG was performed. Pt with PEG tube noted dressing clean dry intact abd soft nontender nondistended. Pt arrived with indwelling serna cath with fred though non purulent output noted, this was removed per orders. New 16F serna cath was placed using sterile technique, minimal output of clear yellow urine present(total 50ml 30mins after placement). Pt was rectally afebrile at 99.6 though family at bedside note last giving Tylenol around 4pm today. Upon rectal temp, sacral area has healed pressure ulcer not skin breakdown noted but area appears red, though blanchable. Pt FS assessed noted at 556, Md israel made aware will medicate per orders monitor and reassess very closely

## 2017-08-16 NOTE — H&P ADULT - ATTENDING COMMENTS
This patient was evaluated with the residents and fellow and management decisions were made.  I agree with the above A/P  -AMS  -acute on chronic respiratory failure now on the ventilator  -HCAP

## 2017-08-16 NOTE — ED ADULT NURSE REASSESSMENT NOTE - NS ED NURSE REASSESS COMMENT FT1
pt resting on stretcher appears comfortable calm. Status is unchanged, NSR remains on continuous cardiac monitor, respirations even and unlabored on ventilator setting 10/5 o2 80. Propofol infusing via 18G R wrist at 2.5ml/hr. 0.45% NS infusing 18G PIV left hand at 125ml/hr. Blood glucose was repeated remains elevated at 483, MD Mccarthy made aware, will medicate per orders monitor and reassess. Family at bedside informed and agreeable to plan and pt status

## 2017-08-16 NOTE — H&P ADULT - HISTORY OF PRESENT ILLNESS
Patient is a 89 yo M with PMH of Alzheimer's disease, Epilepsy, Aspiration Pneumonitis now s/p PEG placement, CVA with L sided deficits, GIB, HTN, CAD, who presented with respiratory distress, fevers and AMS. Per family and charting, patient has been more lethargic recently with fevers present at home (Tm 101), which began about 2 days ago; prior to this patient was in his normal state of health. Following these fevers, patient was given Amikacin and Ceftriaxone at home (has IV placed at home, unclear from who), without improvement in symptoms, prompting his arrival to ED. Upon arrival to ED, patient was unable to protect airway so he was intubated. Of note, patient was admitted to [facility] 7/15-7/18 for similar presenting symptoms (respiratory distress and AMS); it was noted at that time per family that he has acute respiratory distress episodes every 2 weeks, presumed to be stridor from deviated trachea (per family), and is given atropine and solumedrol with relief of symptoms. Since that hospitalization, patient has been taking Prednisone 20mg daily for treatment of his deviated trachea (per family at bedside today). Also of note, patient has been given Miralax recently to avoid constipation, as patient had been suffering from constipation in the past, now experiencing several loose bowel movements daily.     In the ED, vitals Tm 99.6, HR 70s, BP 91//69, RR 12, O2 100% on ventilator. Patient was given Vancomycin 1g IV @ 20:00, Imipenem 500mg IV @ 21:00, 2L NS.

## 2017-08-16 NOTE — ED PROVIDER NOTE - OBJECTIVE STATEMENT
Pt with PMHx dementia (baseline is alert and oriented), CAD, CVA w/ L sided weakness, HTN, epilepsy, GI bleed, aspiration PNA, BIBA from home. Pt had fever 101 yesterday. Pt started on Ceftriaxone (6 am) and Amikacin (3:30pm). Pt lasted received Tylenol at 4pm. Pt still having fever. Approx 30 min PTA, pt noted to have SOB. UTO further hx 2/2 pt's clinical status.     Pt admitted to [facility] 7/14-7/18/17. Discharge summary from the admission -   89 year old bedbound M w/ PMH of Alzheimer's dementia, HTN, CAD, CVA w/ left sided deficits, GI bleed, aspiration pneumonitis, epilepsy, w/ PEG, presented to [facility] ED on 7/14/2017 with respiratory distress and altered mental status. At baseline, patient is AAOx3 and prays everyday. Per family (Stephane Turpin), patient was not feeling well for the last few days with increasing respiratory distress and shortness of breath. He was suctioned at home with increased sputum production. They called their PMD who ordered a chest x-ray which showed bilateral infiltrates. Patient did not desaturate or have fevers. At home, peripheral IV lines were placed and his PMD ordered 1g vancomycin, 2 g ceftriaxone, and 250 mg of amikacin. He was also given solumedrol 125 mg and lasix 80 mg. Upon presentation to the ED, T 97.9, HR 92, /83, RR 24, Sat 98%. Patient was found to have increasing respiratory distress, was not able to protect his airway, had stridor and was intubated for airway protection. Per family, patient has an acute respiratory distress episode every 2 weeks, presumed to be stridor from deviated trachea (per family), and is given atropine and solumedrol with relief of symptoms. In the ED, he was given a 1L bolus and started on propofol. Patient was extubated on 7/16 with ENT present at bedside, w/no abnormalities noted on exam and no stridor post extubation. Patient has been grunting since the morning of 7/17 and seems to have shallow breathing but according to the family this is his baseline condition and they would like to take him home. Pt with PMHx dementia (baseline is alert and oriented), CAD, CVA w/ L sided weakness, HTN, epilepsy, GI bleed, aspiration PNA, BIBA from home. Pt had fever 101 yesterday. Pt started on Ceftriaxone (6 am) and Amikacin (3:30pm). Pt lasted received Tylenol at 4pm. Pt still having fever. Approx 30 min PTA, pt noted to have SOB. Pt has been getting daily Miralax to go to the bathroom because previously he had been so constipated, resulting in aspiration. Pt with resultant watery stools. Pt has also been on steroids since last hospitalization. UTO further hx 2/2 pt's clinical status.     Pt admitted to [facility] 7/14-7/18/17. Discharge summary from the admission -   89 year old bedbound M w/ PMH of Alzheimer's dementia, HTN, CAD, CVA w/ left sided deficits, GI bleed, aspiration pneumonitis, epilepsy, w/ PEG, presented to [facility] ED on 7/14/2017 with respiratory distress and altered mental status. At baseline, patient is AAOx3 and prays everyday. Per family (Stephane Dyana), patient was not feeling well for the last few days with increasing respiratory distress and shortness of breath. He was suctioned at home with increased sputum production. They called their PMD who ordered a chest x-ray which showed bilateral infiltrates. Patient did not desaturate or have fevers. At home, peripheral IV lines were placed and his PMD ordered 1g vancomycin, 2 g ceftriaxone, and 250 mg of amikacin. He was also given solumedrol 125 mg and lasix 80 mg. Upon presentation to the ED, T 97.9, HR 92, /83, RR 24, Sat 98%. Patient was found to have increasing respiratory distress, was not able to protect his airway, had stridor and was intubated for airway protection. Per family, patient has an acute respiratory distress episode every 2 weeks, presumed to be stridor from deviated trachea (per family), and is given atropine and solumedrol with relief of symptoms. In the ED, he was given a 1L bolus and started on propofol. Patient was extubated on 7/16 with ENT present at bedside, w/no abnormalities noted on exam and no stridor post extubation. Patient has been grunting since the morning of 7/17 and seems to have shallow breathing but according to the family this is his baseline condition and they would like to take him home.

## 2017-08-16 NOTE — CONSULT NOTE ADULT - ATTENDING COMMENTS
Patient seen and examined with house-staff during bedside rounds.  Resident note read, including vitals, physical findings, laboratory data, and radiological reports.   Revisions included below.  Direct personal management at bed side and extensive interpretation of the data.  Plan was outlined and discussed in details with the housestaff.  Decision making of high complexity.  Respiratory failure secondary to pneumonia.. Possible bronchoscopy. Continue antibiotic. Follow cultures.

## 2017-08-16 NOTE — ED ADULT NURSE REASSESSMENT NOTE - NS ED NURSE REASSESS COMMENT FT1
pt with clean bed on 5east ccu RNs not able to take report at this time state they do not have staff to board the patient and are waitign to hear from ADN

## 2017-08-16 NOTE — ED PROVIDER NOTE - MEDICAL DECISION MAKING DETAILS
Pt BIBA for fever, noted to have resp distress, depressed mental status. Pt intubated on arrival for impending resp failure. + thick sputum. Sepsis w/u. Broad spectrum abx, recent ESBL E coli in sputum. IVF. Insulin for BS (likely steroid-induced). Anticipate admission to MICU

## 2017-08-16 NOTE — CONSULT NOTE ADULT - PROBLEM SELECTOR RECOMMENDATION 2
- Na 151 with elevated BUN/Cr, hypochloremia  - Likely hypovolemic hypernatremia 2/2 lasix diuretic + excessive BM with miralax  - Pt with a significant metabolic alkalosis, likely a contraction alkalosis  - Given glucose > 600, actual Na is more likely > 160 - s/p 2 L NS, free water deficit 2.75 L   - F/U UA with lytes and urine osmolarity  - C/W NS @ 125, repeat BMP and follow sodium q4 hrs, goal Na 145 within 12 hrs. Can switch to 1/2 NS if Na not responding to fluid resuscitation - Likely 2/2 pneumonia as above  - on VC//10/50  - Given significant metabolic alkalosis, pt at risk for arrhythmias  - Will check serial VBG's and aim for pH < 7.5 by adjusting TV/RR  - Will set ventilator to rate of 10 to assist with respiratory compensation; if pt over breathing vent can decrease TV to drop minute ventilation.

## 2017-08-16 NOTE — ED ADULT NURSE NOTE - OBJECTIVE STATEMENT
patient BIBA for difficulty breathing. Breathing labored on arrival, tachypnic, diaphoretic. 90% on NRBFM.  upgraded to MD. PEG tube, leg bag noted on arrival. Itubated by MD. Respiratory at bedside. EMS reports hx of fever, was given tylenol approximately 4 hours ago.

## 2017-08-16 NOTE — CONSULT NOTE ADULT - ASSESSMENT
89 yo M with PMH CVA, aspiration pneumonitis s/p PEG, epilepsy, presents with AMS, respiratory distress, fever, associated with lactic acidosis, hypernatremia, and hyperglycemia, noted to have thick sputum upon suctioning likely 2/2 sepsis due to tracheobronchitis vs. pneumonia

## 2017-08-16 NOTE — ED PROVIDER NOTE - DIAGNOSTIC INTERPRETATION
ER Physician: Betzaida Tan,   CHEST XRAY INTERPRETATION: mild congestion. lungs clear, heart shadow normal, bony structures intact. ETT satisfactory position

## 2017-08-16 NOTE — CONSULT NOTE ADULT - PROBLEM SELECTOR RECOMMENDATION 4
- Possibly HHS given severe dehydration and elevated glucose with no metabolic acidosis. No hx of diabetes, but has been on standing steroids 20mg prednisone  - Check beta hydroxybutyrate and urinary ketones  - check hgb a1c  - Start insulin gtt per [facility] protocol  - Pt currently hypokalemic, but given excessive metabolic alkalosis likely due to cellular shift and would not repleat. Expect K to normalize with pH normalization; however, f/u BMP and if < 3.3 replete

## 2017-08-16 NOTE — ED PROVIDER NOTE - CRITICAL CARE PROVIDED
direct patient care (not related to procedure)/interpretation of diagnostic studies/consult w/ pt's family directly relating to pts condition/consultation with other physicians/documentation/additional history taking

## 2017-08-16 NOTE — CONSULT NOTE ADULT - PROBLEM SELECTOR RECOMMENDATION 9
- + fever to 101 at home associated with tachypnea, hypoxia, and AMS likely due to sepsis. Pt s/p ceftriaxone + amikacin from PMD prior to ER presentation  - Most likely source is respiratory given pt with thick purulent secretions from ET tube  - F/U UA to assess for nitrite/LE  - No obvious skin breakdown or cellulitis  - Previous sputum cx from 7/17 with + ESBL E coli resistant to zosyn - will give vancomycin + imipenim and check blood cx, sputum cx  - Will follow from pulmonary team for possible bronchoscopy tomorrow pending improvement in respiratory status  - Admit to ICU, d/w Dr. Bradford - + fever to 101 at home associated with tachypnea, hypoxia, and AMS likely due to sepsis. Pt s/p ceftriaxone + amikacin from PMD prior to ER presentation  - Most likely source is respiratory given pt with thick purulent secretions from ET tube  - F/U UA to assess for nitrite/LE  - No obvious skin breakdown or cellulitis  - Previous sputum cx from 7/17 with + ESBL E coli resistant to zosyn - will give vancomycin + imipenim and check blood cx, sputum cx  - BP currently stable s/p IVF, if pt becomes hypotensive consider stress steroids as pt has been on 20mg of prednisone for 4 weeks  - Will follow from pulmonary team for possible bronchoscopy tomorrow pending improvement in respiratory status  - Admit to ICU, d/w Dr. Bradford

## 2017-08-16 NOTE — CONSULT NOTE ADULT - SUBJECTIVE AND OBJECTIVE BOX
Patient is a 89y old  Male who presents with a chief complaint of shortness of breath    HPI: 89 year old bedbound M w/ PMH of Alzheimer's dementia, HTN, CAD, CVA w/ left sided deficits, GI bleed, aspiration pneumonitis, epilepsy, w/ PEG, presenting to [facility] ED with respiratory distress, fever, and altered mental status. Pt previously admitted 1 month ago for similar complaints requiring intubation 2/2 ? stridor?, but was extubated with ENT at bedside and no difficulties. He was discharged s/p extubation and sent home with no antibiotics. Pt was back to his usual state of health until 1-2 days ago when the family  noticed he was more lethargic and spiked a fever to 101. Pt was given amikacin and ceftriaxone at home, but his mental status continued to deteriorate and pt was brought to the ED. At the time of this interview the pt is sedated and intubated, history obtained from family ad bedside and ER physician. Per family, pt has trouble coughing up his secretions. Prior to these last few days he was in no respiratory distress. He has been receiving jevity 1.5 @ 3 cans/day and tolerating well with multiple loose bowel movements a day which the family attributes to daily miralax in order to avoid constipation.     ROS otherwise negative    Upon presentation to ED, pt was found to be tachypneic to the 30's, unresponsive to verbal stimuli, and hypoxic to 90% on a NRB. Intubated in the ER s/p etomidate and propofol.       PAST MEDICAL & SURGICAL HISTORY:  Cardiomegaly  Legally blind  Seizure disorder  Prostate hyperplasia with urinary obstruction  Atherosclerosis of coronary artery: CAD (coronary artery disease)  Essential hypertension: HTN (hypertension)  Cerebral artery occlusion with cerebral infarction: CVA (cerebral vascular accident)  Hyperlipidemia: HLD (hyperlipidemia)  Iron deficiency anemia: Iron deficiency anemia  S/P percutaneous endoscopic gastrostomy (PEG) tube placement  Grubbs catheter in place      FAMILY HISTORY:      SOCIAL HISTORY:  Smoking Status: [ ] Current, [ ] Former, [ ] Never  Pack Years:    MEDICATIONS:  Pulmonary:            Vital Signs Last 24 Hrs  T(C): 37.6 (16 Aug 2017 20:13), Max: 37.6 (16 Aug 2017 20:13)  T(F): 99.6 (16 Aug 2017 20:13), Max: 99.6 (16 Aug 2017 20:13)  HR: 73 (16 Aug 2017 20:35) (73 - 100)  BP: 115/60 (16 Aug 2017 20:35) (98/61 - 133/69)  BP(mean): --  RR: 12 (16 Aug 2017 20:35) (12 - 30)  SpO2: 100% (16 Aug 2017 20:35) (94% - 100%)    Physical exam:  General - Contracted extremities, unresponsive to verbal stimuli,   HEENT - Dry MMM  Neck - no JVD  Resp - + Rhonchi CHARMAINE, RLL, no rales  CV - RRR, no M/R/G  Abd - + peg tube, site clean/dry/intact, soft, nondistended  Ext - WWP, no C/C/E, mild dependent edema      LABS:      CBC Full  -  ( 16 Aug 2017 19:50 )  WBC Count : 8.0 K/uL  Hemoglobin : 11.1 g/dL  Hematocrit : 36.0 %  Platelet Count - Automated : 144 K/uL  Mean Cell Volume : 97.0 fL  Mean Cell Hemoglobin : 29.9 pg  Mean Cell Hemoglobin Concentration : 30.8 g/dL  Auto Neutrophil % : 91.1 %  Auto Lymphocyte % : 6.7 %  Auto Monocyte % : 2.2 %  Auto Eosinophil % : 0.0 %  Auto Basophil % : 0.0 %    08-16    151<H>  |  95<L>  |  71<H>  ----------------------------<  658<HH>  3.4<L>   |  44<H>  |  1.10    Ca    9.7      16 Aug 2017 19:50    TPro  7.6  /  Alb  3.2<L>  /  TBili  0.3  /  DBili  x   /  AST  40  /  ALT  86<H>  /  AlkPhos  164<H>  08-16    PT/INR - ( 16 Aug 2017 19:50 )   PT: 12.2 sec;   INR: 1.10          PTT - ( 16 Aug 2017 19:50 )  PTT:26.7 sec                  RADIOLOGY & ADDITIONAL STUDIES (The following images were personally reviewed):    CXR - Chronic interstitial changes, ? RML infiltrate Patient is a 89y old  Male who presents with a chief complaint of shortness of breath    HPI: 89 year old bedbound M w/ PMH of Alzheimer's dementia, HTN, CAD, CVA w/ left sided deficits, GI bleed, aspiration pneumonitis, epilepsy, w/ PEG, presenting to [facility] ED with respiratory distress, fever, and altered mental status. Pt previously admitted 1 month ago for similar complaints requiring intubation 2/2 ? stridor?, but was extubated with ENT at bedside and no difficulties. He was discharged s/p extubation and sent home with no antibiotics. Pt was back to his usual state of health until 1-2 days ago when the family  noticed he was more lethargic and spiked a fever to 101. Pt was given amikacin and ceftriaxone at home, but his mental status continued to deteriorate and pt was brought to the ED. At the time of this interview the pt is sedated and intubated, history obtained from family ad bedside and ER physician. Per family, pt has trouble coughing up his secretions. Prior to these last few days he was in no respiratory distress. He has been receiving jevity 1.5 @ 3 cans/day and tolerating well with multiple loose bowel movements a day which the family attributes to daily miralax in order to avoid constipation. Pt has been on 20mg of prednisone since last admission for possible tracheal stenosis.    ROS otherwise negative    Upon presentation to ED, pt was found to be tachypneic to the 30's, unresponsive to verbal stimuli, and hypoxic to 90% on a NRB. Intubated in the ER s/p etomidate and propofol.       PAST MEDICAL & SURGICAL HISTORY:  Cardiomegaly  Legally blind  Seizure disorder  Prostate hyperplasia with urinary obstruction  Atherosclerosis of coronary artery: CAD (coronary artery disease)  Essential hypertension: HTN (hypertension)  Cerebral artery occlusion with cerebral infarction: CVA (cerebral vascular accident)  Hyperlipidemia: HLD (hyperlipidemia)  Iron deficiency anemia: Iron deficiency anemia  S/P percutaneous endoscopic gastrostomy (PEG) tube placement  Grubbs catheter in place      FAMILY HISTORY:      SOCIAL HISTORY:  Smoking Status: [ ] Current, [ ] Former, [ ] Never  Pack Years:    MEDICATIONS:  Pulmonary:            Vital Signs Last 24 Hrs  T(C): 37.6 (16 Aug 2017 20:13), Max: 37.6 (16 Aug 2017 20:13)  T(F): 99.6 (16 Aug 2017 20:13), Max: 99.6 (16 Aug 2017 20:13)  HR: 73 (16 Aug 2017 20:35) (73 - 100)  BP: 115/60 (16 Aug 2017 20:35) (98/61 - 133/69)  BP(mean): --  RR: 12 (16 Aug 2017 20:35) (12 - 30)  SpO2: 100% (16 Aug 2017 20:35) (94% - 100%)    Physical exam:  General - Contracted extremities, unresponsive to verbal stimuli,   HEENT - Dry MMM  Neck - no JVD  Resp - + Rhonchi CHARMAINE, RLL, no rales  CV - RRR, no M/R/G  Abd - + peg tube, site clean/dry/intact, soft, nondistended  Ext - WWP, no C/C/E, mild dependent edema  Skin - Intact, no sacral decub      LABS:      CBC Full  -  ( 16 Aug 2017 19:50 )  WBC Count : 8.0 K/uL  Hemoglobin : 11.1 g/dL  Hematocrit : 36.0 %  Platelet Count - Automated : 144 K/uL  Mean Cell Volume : 97.0 fL  Mean Cell Hemoglobin : 29.9 pg  Mean Cell Hemoglobin Concentration : 30.8 g/dL  Auto Neutrophil % : 91.1 %  Auto Lymphocyte % : 6.7 %  Auto Monocyte % : 2.2 %  Auto Eosinophil % : 0.0 %  Auto Basophil % : 0.0 %    08-16    151<H>  |  95<L>  |  71<H>  ----------------------------<  658<HH>  3.4<L>   |  44<H>  |  1.10    Ca    9.7      16 Aug 2017 19:50    TPro  7.6  /  Alb  3.2<L>  /  TBili  0.3  /  DBili  x   /  AST  40  /  ALT  86<H>  /  AlkPhos  164<H>  08-16    PT/INR - ( 16 Aug 2017 19:50 )   PT: 12.2 sec;   INR: 1.10          PTT - ( 16 Aug 2017 19:50 )  PTT:26.7 sec                  RADIOLOGY & ADDITIONAL STUDIES (The following images were personally reviewed):    CXR - Chronic interstitial changes, ? RML infiltrate

## 2017-08-16 NOTE — ED PROVIDER NOTE - CARE PLAN
Principal Discharge DX:	Acute respiratory failure, unspecified whether with hypoxia or hypercapnia  Secondary Diagnosis:	Sepsis, due to unspecified organism  Secondary Diagnosis:	Metabolic alkalosis

## 2017-08-16 NOTE — H&P ADULT - NSHPLABSRESULTS_GEN_ALL_CORE
11.1   8.0   )-----------( 144      ( 16 Aug 2017 19:50 )             36.0     08-16    153<H>  |  102  |  66<H>  ----------------------------<  505<HH>  3.0<L>   |  40<H>  |  1.10    Ca    8.6      16 Aug 2017 23:24  Phos  2.2     08-16  Mg     2.8     08-16    TPro  7.6  /  Alb  3.2<L>  /  TBili  0.3  /  DBili  x   /  AST  40  /  ALT  86<H>  /  AlkPhos  164<H>  08-16    PT/INR - ( 16 Aug 2017 19:50 )   PT: 12.2 sec;   INR: 1.10     PTT - ( 16 Aug 2017 19:50 )  PTT:26.7 sec    Urinalysis Basic - ( 16 Aug 2017 20:52 )    Color: Yellow / Appearance: Cloudy / SG: <=1.005 / pH: x  Gluc: x / Ketone: NEGATIVE  / Bili: NEGATIVE / Urobili: 0.2 E.U./dL   Blood: x / Protein: 30 mg/dL / Nitrite: NEGATIVE   Leuk Esterase: Moderate / RBC: > 10 /HPF / WBC Many /HPF   Sq Epi: x / Non Sq Epi: Few /HPF / Bacteria: Present /HPF      CARDIAC MARKERS ( 16 Aug 2017 19:50 )  x     / 0.12 ng/mL / 23 U/L / x     / 1.2 ng/mL        Lactate, Blood: 3.5 mmoL/L (08-16 @ 19:48)      RADIOLOGY, EKG & ADDITIONAL TESTS: Reviewed.

## 2017-08-16 NOTE — ED ADULT NURSE NOTE - CHIEF COMPLAINT QUOTE
Pt coming from home w/ fever, increased respiratory effort. Pt managed at home w/ peg tube  antipyretics and IM antibiotics.

## 2017-08-16 NOTE — CONSULT NOTE ADULT - PROBLEM SELECTOR RECOMMENDATION 3
- Possibly HHS given severe dehydration and elevated glucose with no metabolic acidosis  - Check beta hydroxybutyrate and urinary ketones  - Start insulin gtt per [facility] protocol  - Pt currently hypokalemic, but given excessive metabolic alkalosis likely due to cellular shift and would not repleat. Expect K to normalize with pH normalization; however, f/u BMP and if < 3.3 replete   - Will set ventilator to rate of 8 to assist with respiratory compensation; if pt over breathing vent can decrease TV to drop minute ventilation. - Na 151 with elevated BUN/Cr, hypochloremia  - Likely hypovolemic hypernatremia 2/2 lasix diuretic + excessive BM with miralax  - Pt with a significant metabolic alkalosis, likely a contraction alkalosis  - s/p 2 L NS and started on 1/2 NS @ 125 cc/hr in ER  - Given glucose > 600, actual Na is more likely > 160   - Free water deficit 2.75 L   - C/W NS @ 125, repeat BMP and follow sodium q4 hrs, goal Na 145 within 12 hrs. Can switch to 1/2 NS if Na not responding to fluid resuscitation  - F/U UA with lytes and urine osmolarity

## 2017-08-16 NOTE — H&P ADULT - NSHPSOCIALHISTORY_GEN_ALL_CORE
lives with family    lifelong nonsmoker, no substance abuse lives with family  lifelong nonsmoker, no substance abuse

## 2017-08-16 NOTE — H&P ADULT - NSHPPHYSICALEXAM_GEN_ALL_CORE
Gen: intubated and sedated, does not appear to be in any distress  HEENT: intubated, dry mucus membranes, PERRLA  CV: regular rate and rhythm, +S1/S2, no murmurs  Resp: diffuse rhonchi in both lung fields lower lobes > upper lobes  Abd: soft,  +PEG in pace appears clean and dry  Extr: warm and well perfused, LUE contracted, nonresponsive to painful stimuli  Neuro: intubated and sedated, not responding to painful stimuli in any extremity, cranial nerves -PERRLA, face symmetric  Skin: no skin breakdown present

## 2017-08-17 PROBLEM — I51.7 CARDIOMEGALY: Chronic | Status: ACTIVE | Noted: 2017-07-14

## 2017-08-17 LAB
-  K. PNEUMONIAE GROUP: SIGNIFICANT CHANGE UP
ANION GAP SERPL CALC-SCNC: 10 MMOL/L — SIGNIFICANT CHANGE UP (ref 5–17)
ANION GAP SERPL CALC-SCNC: 11 MMOL/L — SIGNIFICANT CHANGE UP (ref 5–17)
ANION GAP SERPL CALC-SCNC: 11 MMOL/L — SIGNIFICANT CHANGE UP (ref 5–17)
ANION GAP SERPL CALC-SCNC: 8 MMOL/L — SIGNIFICANT CHANGE UP (ref 5–17)
ANION GAP SERPL CALC-SCNC: 9 MMOL/L — SIGNIFICANT CHANGE UP (ref 5–17)
B-OH-BUTYR SERPL-SCNC: 0.1 MMOL/L — SIGNIFICANT CHANGE UP
BASE EXCESS BLDV CALC-SCNC: 14.6 MMOL/L — SIGNIFICANT CHANGE UP
BASE EXCESS BLDV CALC-SCNC: 19 MMOL/L — SIGNIFICANT CHANGE UP
BUN SERPL-MCNC: 49 MG/DL — HIGH (ref 7–23)
BUN SERPL-MCNC: 57 MG/DL — HIGH (ref 7–23)
BUN SERPL-MCNC: 59 MG/DL — HIGH (ref 7–23)
BUN SERPL-MCNC: 61 MG/DL — HIGH (ref 7–23)
BUN SERPL-MCNC: 66 MG/DL — HIGH (ref 7–23)
CALCIUM SERPL-MCNC: 8 MG/DL — LOW (ref 8.4–10.5)
CALCIUM SERPL-MCNC: 8.1 MG/DL — LOW (ref 8.4–10.5)
CALCIUM SERPL-MCNC: 8.6 MG/DL — SIGNIFICANT CHANGE UP (ref 8.4–10.5)
CALCIUM SERPL-MCNC: 8.7 MG/DL — SIGNIFICANT CHANGE UP (ref 8.4–10.5)
CALCIUM SERPL-MCNC: 8.9 MG/DL — SIGNIFICANT CHANGE UP (ref 8.4–10.5)
CHLORIDE SERPL-SCNC: 102 MMOL/L — SIGNIFICANT CHANGE UP (ref 96–108)
CHLORIDE SERPL-SCNC: 104 MMOL/L — SIGNIFICANT CHANGE UP (ref 96–108)
CHLORIDE SERPL-SCNC: 106 MMOL/L — SIGNIFICANT CHANGE UP (ref 96–108)
CHLORIDE SERPL-SCNC: 107 MMOL/L — SIGNIFICANT CHANGE UP (ref 96–108)
CHLORIDE SERPL-SCNC: 110 MMOL/L — HIGH (ref 96–108)
CO2 SERPL-SCNC: 31 MMOL/L — SIGNIFICANT CHANGE UP (ref 22–31)
CO2 SERPL-SCNC: 33 MMOL/L — HIGH (ref 22–31)
CO2 SERPL-SCNC: 40 MMOL/L — HIGH (ref 22–31)
CREAT SERPL-MCNC: 0.8 MG/DL — SIGNIFICANT CHANGE UP (ref 0.5–1.3)
CREAT SERPL-MCNC: 0.9 MG/DL — SIGNIFICANT CHANGE UP (ref 0.5–1.3)
CREAT SERPL-MCNC: 1 MG/DL — SIGNIFICANT CHANGE UP (ref 0.5–1.3)
CREAT SERPL-MCNC: 1 MG/DL — SIGNIFICANT CHANGE UP (ref 0.5–1.3)
CREAT SERPL-MCNC: 1.1 MG/DL — SIGNIFICANT CHANGE UP (ref 0.5–1.3)
GAS PNL BLDV: SIGNIFICANT CHANGE UP
GAS PNL BLDV: SIGNIFICANT CHANGE UP
GLUCOSE SERPL-MCNC: 123 MG/DL — HIGH (ref 70–99)
GLUCOSE SERPL-MCNC: 146 MG/DL — HIGH (ref 70–99)
GLUCOSE SERPL-MCNC: 197 MG/DL — HIGH (ref 70–99)
GLUCOSE SERPL-MCNC: 330 MG/DL — HIGH (ref 70–99)
GLUCOSE SERPL-MCNC: 505 MG/DL — CRITICAL HIGH (ref 70–99)
GRAM STN FLD: SIGNIFICANT CHANGE UP
GRAM STN FLD: SIGNIFICANT CHANGE UP
HCO3 BLDV-SCNC: 41 MMOL/L — HIGH (ref 20–27)
HCO3 BLDV-SCNC: 44 MMOL/L — HIGH (ref 20–27)
HCT VFR BLD CALC: 26.6 % — LOW (ref 39–50)
HCT VFR BLD CALC: 30 % — LOW (ref 39–50)
HGB BLD-MCNC: 8.4 G/DL — LOW (ref 13–17)
HGB BLD-MCNC: 9.3 G/DL — LOW (ref 13–17)
LACTATE SERPL-SCNC: 2 MMOL/L — SIGNIFICANT CHANGE UP (ref 0.5–2)
MAGNESIUM SERPL-MCNC: 2.5 MG/DL — SIGNIFICANT CHANGE UP (ref 1.6–2.6)
MAGNESIUM SERPL-MCNC: 2.6 MG/DL — SIGNIFICANT CHANGE UP (ref 1.6–2.6)
MAGNESIUM SERPL-MCNC: 2.8 MG/DL — HIGH (ref 1.6–2.6)
MCHC RBC-ENTMCNC: 29.8 PG — SIGNIFICANT CHANGE UP (ref 27–34)
MCHC RBC-ENTMCNC: 30.1 PG — SIGNIFICANT CHANGE UP (ref 27–34)
MCHC RBC-ENTMCNC: 31 G/DL — LOW (ref 32–36)
MCHC RBC-ENTMCNC: 31.6 G/DL — LOW (ref 32–36)
MCV RBC AUTO: 95.3 FL — SIGNIFICANT CHANGE UP (ref 80–100)
MCV RBC AUTO: 96.2 FL — SIGNIFICANT CHANGE UP (ref 80–100)
METHOD TYPE: SIGNIFICANT CHANGE UP
PCO2 BLDV: 55 MMHG — HIGH (ref 41–51)
PCO2 BLDV: 56 MMHG — HIGH (ref 41–51)
PH BLDV: 7.49 — HIGH (ref 7.32–7.43)
PH BLDV: 7.51 — HIGH (ref 7.32–7.43)
PHOSPHATE SERPL-MCNC: 1.3 MG/DL — LOW (ref 2.5–4.5)
PHOSPHATE SERPL-MCNC: 1.4 MG/DL — LOW (ref 2.5–4.5)
PHOSPHATE SERPL-MCNC: 2.2 MG/DL — LOW (ref 2.5–4.5)
PLATELET # BLD AUTO: 133 K/UL — LOW (ref 150–400)
PLATELET # BLD AUTO: 138 K/UL — LOW (ref 150–400)
PO2 BLDV: 78 MMHG — SIGNIFICANT CHANGE UP
PO2 BLDV: 96 MMHG — SIGNIFICANT CHANGE UP
POTASSIUM SERPL-MCNC: 3 MMOL/L — LOW (ref 3.5–5.3)
POTASSIUM SERPL-MCNC: 3.3 MMOL/L — LOW (ref 3.5–5.3)
POTASSIUM SERPL-MCNC: 3.4 MMOL/L — LOW (ref 3.5–5.3)
POTASSIUM SERPL-MCNC: 4 MMOL/L — SIGNIFICANT CHANGE UP (ref 3.5–5.3)
POTASSIUM SERPL-MCNC: 4.7 MMOL/L — SIGNIFICANT CHANGE UP (ref 3.5–5.3)
POTASSIUM SERPL-SCNC: 3 MMOL/L — LOW (ref 3.5–5.3)
POTASSIUM SERPL-SCNC: 3.3 MMOL/L — LOW (ref 3.5–5.3)
POTASSIUM SERPL-SCNC: 3.4 MMOL/L — LOW (ref 3.5–5.3)
POTASSIUM SERPL-SCNC: 4 MMOL/L — SIGNIFICANT CHANGE UP (ref 3.5–5.3)
POTASSIUM SERPL-SCNC: 4.7 MMOL/L — SIGNIFICANT CHANGE UP (ref 3.5–5.3)
RBC # BLD: 2.79 M/UL — LOW (ref 4.2–5.8)
RBC # BLD: 3.12 M/UL — LOW (ref 4.2–5.8)
RBC # FLD: 14.2 % — SIGNIFICANT CHANGE UP (ref 10.3–16.9)
RBC # FLD: 14.3 % — SIGNIFICANT CHANGE UP (ref 10.3–16.9)
SAO2 % BLDV: 96 % — SIGNIFICANT CHANGE UP
SAO2 % BLDV: 98 % — SIGNIFICANT CHANGE UP
SODIUM SERPL-SCNC: 149 MMOL/L — HIGH (ref 135–145)
SODIUM SERPL-SCNC: 152 MMOL/L — HIGH (ref 135–145)
SODIUM SERPL-SCNC: 153 MMOL/L — HIGH (ref 135–145)
SODIUM SERPL-SCNC: 154 MMOL/L — HIGH (ref 135–145)
SODIUM SERPL-SCNC: 154 MMOL/L — HIGH (ref 135–145)
TROPONIN T SERPL-MCNC: 0.09 NG/ML — CRITICAL HIGH (ref 0–0.01)
WBC # BLD: 7.8 K/UL — SIGNIFICANT CHANGE UP (ref 3.8–10.5)
WBC # BLD: 8 K/UL — SIGNIFICANT CHANGE UP (ref 3.8–10.5)
WBC # FLD AUTO: 7.8 K/UL — SIGNIFICANT CHANGE UP (ref 3.8–10.5)
WBC # FLD AUTO: 8 K/UL — SIGNIFICANT CHANGE UP (ref 3.8–10.5)

## 2017-08-17 PROCEDURE — 99291 CRITICAL CARE FIRST HOUR: CPT

## 2017-08-17 PROCEDURE — 71010: CPT | Mod: 26

## 2017-08-17 RX ORDER — HYDROCORTISONE 20 MG
50 TABLET ORAL EVERY 6 HOURS
Qty: 0 | Refills: 0 | Status: DISCONTINUED | OUTPATIENT
Start: 2017-08-17 | End: 2017-08-19

## 2017-08-17 RX ORDER — MEROPENEM 1 G/30ML
1000 INJECTION INTRAVENOUS EVERY 12 HOURS
Qty: 0 | Refills: 0 | Status: DISCONTINUED | OUTPATIENT
Start: 2017-08-17 | End: 2017-08-24

## 2017-08-17 RX ORDER — INSULIN LISPRO 100/ML
VIAL (ML) SUBCUTANEOUS EVERY 6 HOURS
Qty: 0 | Refills: 0 | Status: DISCONTINUED | OUTPATIENT
Start: 2017-08-17 | End: 2017-08-24

## 2017-08-17 RX ORDER — DEXTROSE 50 % IN WATER 50 %
25 SYRINGE (ML) INTRAVENOUS ONCE
Qty: 0 | Refills: 0 | Status: DISCONTINUED | OUTPATIENT
Start: 2017-08-17 | End: 2017-08-24

## 2017-08-17 RX ORDER — SODIUM CHLORIDE 9 MG/ML
1000 INJECTION, SOLUTION INTRAVENOUS
Qty: 0 | Refills: 0 | Status: DISCONTINUED | OUTPATIENT
Start: 2017-08-17 | End: 2017-08-17

## 2017-08-17 RX ORDER — PROPOFOL 10 MG/ML
5 INJECTION, EMULSION INTRAVENOUS
Qty: 1000 | Refills: 0 | Status: DISCONTINUED | OUTPATIENT
Start: 2017-08-17 | End: 2017-08-19

## 2017-08-17 RX ORDER — GLUCAGON INJECTION, SOLUTION 0.5 MG/.1ML
1 INJECTION, SOLUTION SUBCUTANEOUS ONCE
Qty: 0 | Refills: 0 | Status: DISCONTINUED | OUTPATIENT
Start: 2017-08-17 | End: 2017-08-24

## 2017-08-17 RX ORDER — NOREPINEPHRINE BITARTRATE/D5W 8 MG/250ML
0.01 PLASTIC BAG, INJECTION (ML) INTRAVENOUS
Qty: 8 | Refills: 0 | Status: DISCONTINUED | OUTPATIENT
Start: 2017-08-17 | End: 2017-08-17

## 2017-08-17 RX ORDER — POTASSIUM PHOSPHATE, MONOBASIC POTASSIUM PHOSPHATE, DIBASIC 236; 224 MG/ML; MG/ML
15 INJECTION, SOLUTION INTRAVENOUS ONCE
Qty: 0 | Refills: 0 | Status: COMPLETED | OUTPATIENT
Start: 2017-08-17 | End: 2017-08-17

## 2017-08-17 RX ORDER — SODIUM CHLORIDE 9 MG/ML
500 INJECTION INTRAMUSCULAR; INTRAVENOUS; SUBCUTANEOUS ONCE
Qty: 0 | Refills: 0 | Status: COMPLETED | OUTPATIENT
Start: 2017-08-17 | End: 2017-08-17

## 2017-08-17 RX ORDER — POTASSIUM CHLORIDE 20 MEQ
40 PACKET (EA) ORAL ONCE
Qty: 0 | Refills: 0 | Status: COMPLETED | OUTPATIENT
Start: 2017-08-17 | End: 2017-08-17

## 2017-08-17 RX ORDER — HEPARIN SODIUM 5000 [USP'U]/ML
5000 INJECTION INTRAVENOUS; SUBCUTANEOUS EVERY 8 HOURS
Qty: 0 | Refills: 0 | Status: DISCONTINUED | OUTPATIENT
Start: 2017-08-17 | End: 2017-08-24

## 2017-08-17 RX ORDER — DEXTROSE 50 % IN WATER 50 %
1 SYRINGE (ML) INTRAVENOUS ONCE
Qty: 0 | Refills: 0 | Status: DISCONTINUED | OUTPATIENT
Start: 2017-08-17 | End: 2017-08-24

## 2017-08-17 RX ORDER — DEXTROSE 50 % IN WATER 50 %
12.5 SYRINGE (ML) INTRAVENOUS ONCE
Qty: 0 | Refills: 0 | Status: DISCONTINUED | OUTPATIENT
Start: 2017-08-17 | End: 2017-08-24

## 2017-08-17 RX ORDER — QUETIAPINE FUMARATE 200 MG/1
150 TABLET, FILM COATED ORAL EVERY 8 HOURS
Qty: 0 | Refills: 0 | Status: DISCONTINUED | OUTPATIENT
Start: 2017-08-17 | End: 2017-08-21

## 2017-08-17 RX ORDER — LEVETIRACETAM 250 MG/1
1500 TABLET, FILM COATED ORAL
Qty: 0 | Refills: 0 | Status: DISCONTINUED | OUTPATIENT
Start: 2017-08-17 | End: 2017-08-24

## 2017-08-17 RX ORDER — OXCARBAZEPINE 300 MG/1
450 TABLET, FILM COATED ORAL AT BEDTIME
Qty: 0 | Refills: 0 | Status: DISCONTINUED | OUTPATIENT
Start: 2017-08-17 | End: 2017-08-24

## 2017-08-17 RX ORDER — NOREPINEPHRINE BITARTRATE/D5W 8 MG/250ML
0.01 PLASTIC BAG, INJECTION (ML) INTRAVENOUS
Qty: 8 | Refills: 0 | Status: DISCONTINUED | OUTPATIENT
Start: 2017-08-17 | End: 2017-08-19

## 2017-08-17 RX ORDER — SODIUM CHLORIDE 9 MG/ML
1000 INJECTION, SOLUTION INTRAVENOUS
Qty: 0 | Refills: 0 | Status: DISCONTINUED | OUTPATIENT
Start: 2017-08-17 | End: 2017-08-24

## 2017-08-17 RX ORDER — INSULIN GLARGINE 100 [IU]/ML
10 INJECTION, SOLUTION SUBCUTANEOUS AT BEDTIME
Qty: 0 | Refills: 0 | Status: DISCONTINUED | OUTPATIENT
Start: 2017-08-17 | End: 2017-08-19

## 2017-08-17 RX ORDER — MAGNESIUM SULFATE 500 MG/ML
1 VIAL (ML) INJECTION ONCE
Qty: 0 | Refills: 0 | Status: DISCONTINUED | OUTPATIENT
Start: 2017-08-17 | End: 2017-08-17

## 2017-08-17 RX ORDER — OXCARBAZEPINE 300 MG/1
300 TABLET, FILM COATED ORAL DAILY
Qty: 0 | Refills: 0 | Status: DISCONTINUED | OUTPATIENT
Start: 2017-08-17 | End: 2017-08-24

## 2017-08-17 RX ADMIN — OXCARBAZEPINE 450 MILLIGRAM(S): 300 TABLET, FILM COATED ORAL at 22:07

## 2017-08-17 RX ADMIN — GABAPENTIN 300 MILLIGRAM(S): 400 CAPSULE ORAL at 18:16

## 2017-08-17 RX ADMIN — HEPARIN SODIUM 5000 UNIT(S): 5000 INJECTION INTRAVENOUS; SUBCUTANEOUS at 15:03

## 2017-08-17 RX ADMIN — Medication 50 MILLIGRAM(S): at 21:07

## 2017-08-17 RX ADMIN — Medication 3 MILLILITER(S): at 22:55

## 2017-08-17 RX ADMIN — OXCARBAZEPINE 300 MILLIGRAM(S): 300 TABLET, FILM COATED ORAL at 11:57

## 2017-08-17 RX ADMIN — PROPOFOL 2.11 MICROGRAM(S)/KG/MIN: 10 INJECTION, EMULSION INTRAVENOUS at 14:13

## 2017-08-17 RX ADMIN — QUETIAPINE FUMARATE 150 MILLIGRAM(S): 200 TABLET, FILM COATED ORAL at 22:08

## 2017-08-17 RX ADMIN — Medication 2: at 18:20

## 2017-08-17 RX ADMIN — Medication 40 MILLIEQUIVALENT(S): at 09:44

## 2017-08-17 RX ADMIN — Medication 50 MILLIGRAM(S): at 15:03

## 2017-08-17 RX ADMIN — Medication 3 MILLILITER(S): at 18:29

## 2017-08-17 RX ADMIN — Medication 1.32 MICROGRAM(S)/KG/MIN: at 13:41

## 2017-08-17 RX ADMIN — GABAPENTIN 300 MILLIGRAM(S): 400 CAPSULE ORAL at 06:20

## 2017-08-17 RX ADMIN — Medication 10 MILLIGRAM(S): at 11:57

## 2017-08-17 RX ADMIN — SODIUM CHLORIDE 2000 MILLILITER(S): 9 INJECTION INTRAMUSCULAR; INTRAVENOUS; SUBCUTANEOUS at 11:08

## 2017-08-17 RX ADMIN — HEPARIN SODIUM 5000 UNIT(S): 5000 INJECTION INTRAVENOUS; SUBCUTANEOUS at 05:55

## 2017-08-17 RX ADMIN — Medication 1.32 MICROGRAM(S)/KG/MIN: at 20:00

## 2017-08-17 RX ADMIN — SODIUM CHLORIDE 125 MILLILITER(S): 9 INJECTION, SOLUTION INTRAVENOUS at 05:57

## 2017-08-17 RX ADMIN — MEROPENEM 200 MILLIGRAM(S): 1 INJECTION INTRAVENOUS at 21:07

## 2017-08-17 RX ADMIN — HALOPERIDOL DECANOATE 2 MILLIGRAM(S): 100 INJECTION INTRAMUSCULAR at 06:20

## 2017-08-17 RX ADMIN — QUETIAPINE FUMARATE 150 MILLIGRAM(S): 200 TABLET, FILM COATED ORAL at 15:03

## 2017-08-17 RX ADMIN — POTASSIUM PHOSPHATE, MONOBASIC POTASSIUM PHOSPHATE, DIBASIC 62.5 MILLIMOLE(S): 236; 224 INJECTION, SOLUTION INTRAVENOUS at 10:42

## 2017-08-17 RX ADMIN — PANTOPRAZOLE SODIUM 40 MILLIGRAM(S): 20 TABLET, DELAYED RELEASE ORAL at 11:57

## 2017-08-17 RX ADMIN — CHLORHEXIDINE GLUCONATE 15 MILLILITER(S): 213 SOLUTION TOPICAL at 05:57

## 2017-08-17 RX ADMIN — CHLORHEXIDINE GLUCONATE 15 MILLILITER(S): 213 SOLUTION TOPICAL at 17:01

## 2017-08-17 RX ADMIN — SODIUM CHLORIDE 2000 MILLILITER(S): 9 INJECTION INTRAMUSCULAR; INTRAVENOUS; SUBCUTANEOUS at 09:39

## 2017-08-17 RX ADMIN — Medication 3 MILLILITER(S): at 05:04

## 2017-08-17 RX ADMIN — MEROPENEM 200 MILLIGRAM(S): 1 INJECTION INTRAVENOUS at 09:37

## 2017-08-17 RX ADMIN — QUETIAPINE FUMARATE 150 MILLIGRAM(S): 200 TABLET, FILM COATED ORAL at 05:55

## 2017-08-17 RX ADMIN — LEVETIRACETAM 1500 MILLIGRAM(S): 250 TABLET, FILM COATED ORAL at 18:16

## 2017-08-17 RX ADMIN — HEPARIN SODIUM 5000 UNIT(S): 5000 INJECTION INTRAVENOUS; SUBCUTANEOUS at 22:07

## 2017-08-17 RX ADMIN — Medication 50 MILLIGRAM(S): at 09:08

## 2017-08-17 RX ADMIN — INSULIN GLARGINE 10 UNIT(S): 100 INJECTION, SOLUTION SUBCUTANEOUS at 22:07

## 2017-08-17 RX ADMIN — IMIPENEM AND CILASTATIN 100 MILLIGRAM(S): 250; 250 INJECTION, POWDER, FOR SOLUTION INTRAVENOUS at 03:17

## 2017-08-17 RX ADMIN — SODIUM CHLORIDE 125 MILLILITER(S): 9 INJECTION, SOLUTION INTRAVENOUS at 15:03

## 2017-08-17 RX ADMIN — Medication 3 MILLILITER(S): at 01:58

## 2017-08-17 RX ADMIN — Medication 3 MILLILITER(S): at 13:41

## 2017-08-17 RX ADMIN — Medication 250 MILLIGRAM(S): at 19:16

## 2017-08-17 RX ADMIN — LEVETIRACETAM 1500 MILLIGRAM(S): 250 TABLET, FILM COATED ORAL at 05:56

## 2017-08-17 RX ADMIN — ATORVASTATIN CALCIUM 40 MILLIGRAM(S): 80 TABLET, FILM COATED ORAL at 22:07

## 2017-08-17 RX ADMIN — SODIUM CHLORIDE 2000 MILLILITER(S): 9 INJECTION INTRAMUSCULAR; INTRAVENOUS; SUBCUTANEOUS at 12:20

## 2017-08-17 RX ADMIN — Medication 3 MILLILITER(S): at 10:00

## 2017-08-17 RX ADMIN — Medication 40 MILLIEQUIVALENT(S): at 01:59

## 2017-08-17 RX ADMIN — HALOPERIDOL DECANOATE 2 MILLIGRAM(S): 100 INJECTION INTRAMUSCULAR at 18:16

## 2017-08-17 RX ADMIN — SODIUM CHLORIDE 2000 MILLILITER(S): 9 INJECTION INTRAMUSCULAR; INTRAVENOUS; SUBCUTANEOUS at 08:55

## 2017-08-17 RX ADMIN — PROPOFOL 2.11 MICROGRAM(S)/KG/MIN: 10 INJECTION, EMULSION INTRAVENOUS at 19:17

## 2017-08-17 NOTE — PATIENT PROFILE ADULT. - FALL HARM RISK
age(85 years old or older)/weakness/sedated/contractures/other/bones(Osteoporosis,prev fx,steroid use,metastatic bone ca

## 2017-08-17 NOTE — PROGRESS NOTE ADULT - SUBJECTIVE AND OBJECTIVE BOX
INTERVAL HPI/OVERNIGHT EVENTS:    SUBJECTIVE: Patient seen and examined at bedside.    OBJECTIVE:    VITAL SIGNS:  ICU Vital Signs Last 24 Hrs  T(C): 37.6 (17 Aug 2017 13:00), Max: 37.6 (16 Aug 2017 20:13)  T(F): 99.6 (17 Aug 2017 13:00), Max: 99.6 (16 Aug 2017 20:13)  HR: 76 (17 Aug 2017 14:00) (56 - 100)  BP: 121/52 (17 Aug 2017 14:00) (73/42 - 133/69)  BP(mean): 84 (17 Aug 2017 14:00) (56 - 92)  ABP: --  ABP(mean): --  RR: 14 (17 Aug 2017 14:00) (0 - 30)  SpO2: 100% (17 Aug 2017 14:00) (94% - 100%)    Mode: AC/ CMV (Assist Control/ Continuous Mandatory Ventilation), RR (machine): 10, TV (machine): 450, FiO2: 50, PEEP: 5, ITime: 1, MAP: 12, PIP: 24    08-16 @ 07:01  -  08-17 @ 07:00  --------------------------------------------------------  IN: 1378 mL / OUT: 941 mL / NET: 437 mL    08-17 @ 07:01  -  08-17 @ 15:13  --------------------------------------------------------  IN: 3440.4 mL / OUT: 360 mL / NET: 3080.4 mL      CAPILLARY BLOOD GLUCOSE  155 (17 Aug 2017 14:00)          PHYSICAL EXAM:    General: NAD  HEENT: NC/AT; PERRL, clear conjunctiva  Neck: supple  Respiratory: CTA b/l  Cardiovascular: +S1/S2; RRR  Abdomen: soft, NT/ND; +BS x4  Extremities: WWP, 2+ peripheral pulses b/l; no LE edema  Skin: normal color and turgor; no rash  Neurological:     MEDICATIONS:  MEDICATIONS  (STANDING):  insulin Infusion 7 Unit(s)/Hr (7 mL/Hr) IV Continuous <Continuous>  vancomycin  IVPB 1000 milliGRAM(s) IV Intermittent every 24 hours  citalopram 20 milliGRAM(s) Oral daily  atorvastatin 40 milliGRAM(s) Oral at bedtime  busPIRone 10 milliGRAM(s) Oral daily  haloperidol     Tablet 2 milliGRAM(s) Oral two times a day  gabapentin 300 milliGRAM(s) Oral two times a day  ALBUTerol/ipratropium for Nebulization 3 milliLiter(s) Nebulizer every 4 hours  pantoprazole  Injectable 40 milliGRAM(s) IV Push daily  chlorhexidine 0.12% Liquid 15 milliLiter(s) Swish and Spit two times a day  QUEtiapine 150 milliGRAM(s) Oral every 8 hours  levETIRAcetam  Solution 1500 milliGRAM(s) Oral two times a day  OXcarbazepine Suspension 300 milliGRAM(s) Oral daily  OXcarbazepine Suspension 450 milliGRAM(s) Oral at bedtime  heparin  Injectable 5000 Unit(s) SubCutaneous every 8 hours  sodium chloride 0.45% 1000 milliLiter(s) (125 mL/Hr) IV Continuous <Continuous>  hydrocortisone sodium succinate Injectable 50 milliGRAM(s) IV Push every 6 hours  meropenem IVPB 1000 milliGRAM(s) IV Intermittent every 12 hours  propofol Infusion 5 MICROgram(s)/kG/Min (2.109 mL/Hr) IV Continuous <Continuous>  norepinephrine Infusion 0.01 MICROgram(s)/kG/Min (1.318 mL/Hr) IV Continuous <Continuous>    MEDICATIONS  (PRN):      ALLERGIES:  Allergies    No Known Allergies    Intolerances        LABS:                        9.3    8.0   )-----------( 133      ( 17 Aug 2017 03:17 )             30.0     08-17    152<H>  |  110<H>  |  57<H>  ----------------------------<  123<H>  4.7   |  31  |  0.90    Ca    8.1<L>      17 Aug 2017 14:31  Phos  1.3     08-17  Mg     2.6     08-17    TPro  7.6  /  Alb  3.2<L>  /  TBili  0.3  /  DBili  x   /  AST  40  /  ALT  86<H>  /  AlkPhos  164<H>  08-16    PT/INR - ( 16 Aug 2017 19:50 )   PT: 12.2 sec;   INR: 1.10          PTT - ( 16 Aug 2017 19:50 )  PTT:26.7 sec  Urinalysis Basic - ( 16 Aug 2017 20:52 )    Color: Yellow / Appearance: Cloudy / SG: <=1.005 / pH: x  Gluc: x / Ketone: NEGATIVE  / Bili: NEGATIVE / Urobili: 0.2 E.U./dL   Blood: x / Protein: 30 mg/dL / Nitrite: NEGATIVE   Leuk Esterase: Moderate / RBC: > 10 /HPF / WBC Many /HPF   Sq Epi: x / Non Sq Epi: Few /HPF / Bacteria: Present /HPF        RADIOLOGY & ADDITIONAL TESTS: Reviewed. INTERVAL HPI/OVERNIGHT EVENTS: Pt admitted o/n, temp to 99.6 w/ AMS and meeting sepsis criteria, cx's sent. Started on abx tx for likely aspiration PNA.     SUBJECTIVE: Patient seen and examined at bedside. Intubated, sedated, appears comfortable. Not responsive to commands. Does not withdraw from pain. Family at bedside states that his baseline is responsive.     OBJECTIVE:  VITAL SIGNS:  ICU Vital Signs Last 24 Hrs  T(C): 37.6 (17 Aug 2017 13:00), Max: 37.6 (16 Aug 2017 20:13)  T(F): 99.6 (17 Aug 2017 13:00), Max: 99.6 (16 Aug 2017 20:13)  HR: 76 (17 Aug 2017 14:00) (56 - 100)  BP: 121/52 (17 Aug 2017 14:00) (73/42 - 133/69)  BP(mean): 84 (17 Aug 2017 14:00) (56 - 92)  RR: 14 (17 Aug 2017 14:00) (0 - 30)  SpO2: 100% (17 Aug 2017 14:00) (94% - 100%)    Mode: AC/ CMV (Assist Control/ Continuous Mandatory Ventilation)  RR (machine): 10  TV (machine): 450  FiO2: 50  PEEP: 5  ITime: 1  MAP: 12  PIP: 24    08-16 @ 07:01 - 08-17 @ 07:00  --------------------------------------------------------  IN: 1378 mL / OUT: 941 mL / NET: 437 mL    08-17 @ 07:01  -  08-17 @ 15:13  --------------------------------------------------------  IN: 3440.4 mL / OUT: 360 mL / NET: 3080.4 mL      CAPILLARY BLOOD GLUCOSE  155 (17 Aug 2017 14:00)      PHYSICAL EXAM:    General: intubated, sedated, looks comfortable, neck contracted, not responsive  HEENT: PERRL; dry MM  Respiratory: diffuse rhonchi present b/l   Cardiovascular: +S1/S2; RRR; no M/R/G  Abdomen: PEG in place; soft; BS present  Extremities: significant contractures of b/l UE; extremities warm to touch; no LE edema  Neurological: AAOx0. Does not respond to verbal stimuli. Does not respond to tactile stimuli including pain in extremities and sternal rub. PERRL. Cranial nerves intact. +cough    MEDICATIONS:  MEDICATIONS  (STANDING):  insulin Infusion 7 Unit(s)/Hr (7 mL/Hr) IV Continuous <Continuous>  vancomycin  IVPB 1000 milliGRAM(s) IV Intermittent every 24 hours  citalopram 20 milliGRAM(s) Oral daily  atorvastatin 40 milliGRAM(s) Oral at bedtime  busPIRone 10 milliGRAM(s) Oral daily  haloperidol     Tablet 2 milliGRAM(s) Oral two times a day  gabapentin 300 milliGRAM(s) Oral two times a day  ALBUTerol/ipratropium for Nebulization 3 milliLiter(s) Nebulizer every 4 hours  pantoprazole  Injectable 40 milliGRAM(s) IV Push daily  chlorhexidine 0.12% Liquid 15 milliLiter(s) Swish and Spit two times a day  QUEtiapine 150 milliGRAM(s) Oral every 8 hours  levETIRAcetam  Solution 1500 milliGRAM(s) Oral two times a day  OXcarbazepine Suspension 300 milliGRAM(s) Oral daily  OXcarbazepine Suspension 450 milliGRAM(s) Oral at bedtime  heparin  Injectable 5000 Unit(s) SubCutaneous every 8 hours  sodium chloride 0.45% 1000 milliLiter(s) (125 mL/Hr) IV Continuous <Continuous>  hydrocortisone sodium succinate Injectable 50 milliGRAM(s) IV Push every 6 hours  meropenem IVPB 1000 milliGRAM(s) IV Intermittent every 12 hours  propofol Infusion 5 MICROgram(s)/kG/Min (2.109 mL/Hr) IV Continuous <Continuous>  norepinephrine Infusion 0.01 MICROgram(s)/kG/Min (1.318 mL/Hr) IV Continuous <Continuous>    MEDICATIONS  (PRN):      ALLERGIES:  Allergies    No Known Allergies    Intolerances        LABS:                        9.3    8.0   )-----------( 133      ( 17 Aug 2017 03:17 )             30.0     08-17    152<H>  |  110<H>  |  57<H>  ----------------------------<  123<H>  4.7   |  31  |  0.90    Ca    8.1<L>      17 Aug 2017 14:31  Phos  1.3     08-17  Mg     2.6     08-17    TPro  7.6  /  Alb  3.2<L>  /  TBili  0.3  /  DBili  x   /  AST  40  /  ALT  86<H>  /  AlkPhos  164<H>  08-16    PT/INR - ( 16 Aug 2017 19:50 )   PT: 12.2 sec;   INR: 1.10          PTT - ( 16 Aug 2017 19:50 )  PTT:26.7 sec  Urinalysis Basic - ( 16 Aug 2017 20:52 )    Color: Yellow / Appearance: Cloudy / SG: <=1.005 / pH: x  Gluc: x / Ketone: NEGATIVE  / Bili: NEGATIVE / Urobili: 0.2 E.U./dL   Blood: x / Protein: 30 mg/dL / Nitrite: NEGATIVE   Leuk Esterase: Moderate / RBC: > 10 /HPF / WBC Many /HPF   Sq Epi: x / Non Sq Epi: Few /HPF / Bacteria: Present /HPF        RADIOLOGY & ADDITIONAL TESTS: Reviewed.

## 2017-08-17 NOTE — PROGRESS NOTE ADULT - ASSESSMENT
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 PNA now s/p intubation for airway protection, admitted to MICU for monitoring.     NEURO  #AMS   -Likely 2/2 sepsis superimposed on baseline AD. Family reports that at baseline patient is alert and oriented.  CT not performed because of similar episodes that were due to infection.  -c/w abx to treat sepsis and achieve source control  #Seizure  -Pt does not appear to be having clinical seizures at this time, likely not the etiology of AMS  -c/w home Keppra  -c/w home Tripleptal    CV  #Severe Sepsis 2/2 UTI vs. PNA   -Pt hypotensive w/ elevated lactate and likely source of infection urinary tract vs. PNA (hx aspiration pneumonitis)  -c/w NS @ 120/hr for fluid resuscitation (pt also received total 2L in NS bolus today w/o significant improvement in pressures)  -c/w NE @ 0.02 mcg/kg/min  -c/w meropenem and vancomycin   -trend lactate  -maintain MAP >65    RESPIRATORY  #Hypercapnic Respiratory Failure s/p intubation  -Repeat VBG shows improvement of hypercapnia and oxygenation.    -continue on ventilator until mental status improves, currently off sedation but still w/ no response then trial of CPAP   -will need to evaluate ability to to protect airway with questionable hx of tracheal deviation    GASTROINTESTINAL  #Constipation/Diarrhea-patient has been constipated but has been taking miralax and is now experiencing diarrhea   -r/o c.diff    INFECTIOUS DISEASE  #PNA/UTI patient with history of aspiration pneumonitis and ESBL UTI.  CXR shows pleural effusions, urine sent for culture. No leukocytosis or fever but patient AMS with lactate  -f/u blood cx, urine cx  -imipenem because of hx of ESBL  -vancomycin for HAP coverage    ENDOCRINE  #HHS- 2/2 steroid use. Glucose of 685 on admission, BHB, no ketones, slightly alkalotic on blood gas. No diabetes medications listed on home meds.    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 PNA now s/p intubation for airway protection, admitted to MICU for monitoring.     NEURO  #AMS   -Likely 2/2 sepsis superimposed on baseline AD. Family reports that at baseline patient is alert and oriented.  CT not performed because of similar episodes that were due to infection.  -c/w abx to treat sepsis and achieve source control  #Seizure  -Pt does not appear to be having clinical seizures at this time, likely not the etiology of AMS  -c/w home Keppra  -c/w home Tripleptal    CV  #Severe Sepsis 2/2 UTI vs. PNA   -Pt hypotensive w/ elevated lactate and likely source of infection urinary tract vs. PNA (hx aspiration pneumonitis)  -blood cx's growing Klebsiella, sensitivities to follow  -repeat cx's drawn today  -f/u urine, sputum cx's to determine source  -c/w NS @ 120/hr for fluid resuscitation (pt also received total 2L in NS bolus today w/o significant improvement in pressures)  -c/w NE @ 0.02 mcg/kg/min for pressure support  -c/w meropenem and vancomycin   -lactate downtrended    RESPIRATORY  #Hypercapnic Respiratory Failure s/p intubation  -Repeat VBG shows improvement of hypercapnia and oxygenation.    -continue on ventilator until mental status improves, currently off sedation but still w/ no response then trial of CPAP   -will need to evaluate ability to to protect airway with questionable hx of tracheal deviation    GI  #Constipation/Diarrhea  -monitor for BM's     ID  #Klebsiella bacteremia  -Pt w/ hx of aspiration pneumonitis and ESBL UTI. CXR shows pleural effusions but no definitive infiltrate.  -f/u blood cx, urine cx  -c/w imipenem because of hx of ESBL  -vancomycin for HAP coverage    ENDO  #HHS (resolved)  -2/2 steroid use, blood glucose of 685 on admission.  -insulin gtt d/c'd today as glucose downtrended  -c/w IVF  -transitioned to FSG/SSI + Lantus 10u at bedtime    FEN  #Fluids: 1/2NS + K @125/hr  #Monitor, Replete to K>4 and Mg>2  #Diet: NPO    PPX  -SQH  -Protonix    Code Status: FULL CODE

## 2017-08-17 NOTE — PATIENT PROFILE ADULT. - NUTRITION PROFILE
enteral nutrition evaluation/Failure to Thrive/chewing/swallowing difficulty/enteral or parenteral nutrition prior to admission

## 2017-08-17 NOTE — PROVIDER CONTACT NOTE (OTHER) - ACTION/TREATMENT ORDERED:
Administer NS 500ml bolus x 1. Will revaluate.
NS 500ml IV x 1. Stop insulin drip. Repeat FS within 1 hour.
Stop insulin drip. Recheck FS in 30 - 60 mins.

## 2017-08-18 LAB
ANION GAP SERPL CALC-SCNC: 10 MMOL/L — SIGNIFICANT CHANGE UP (ref 5–17)
ANION GAP SERPL CALC-SCNC: 9 MMOL/L — SIGNIFICANT CHANGE UP (ref 5–17)
BLD GP AB SCN SERPL QL: NEGATIVE — SIGNIFICANT CHANGE UP
BUN SERPL-MCNC: 40 MG/DL — HIGH (ref 7–23)
BUN SERPL-MCNC: 44 MG/DL — HIGH (ref 7–23)
CALCIUM SERPL-MCNC: 8.5 MG/DL — SIGNIFICANT CHANGE UP (ref 8.4–10.5)
CALCIUM SERPL-MCNC: 8.7 MG/DL — SIGNIFICANT CHANGE UP (ref 8.4–10.5)
CHLORIDE SERPL-SCNC: 108 MMOL/L — SIGNIFICANT CHANGE UP (ref 96–108)
CHLORIDE SERPL-SCNC: 108 MMOL/L — SIGNIFICANT CHANGE UP (ref 96–108)
CO2 SERPL-SCNC: 34 MMOL/L — HIGH (ref 22–31)
CO2 SERPL-SCNC: 34 MMOL/L — HIGH (ref 22–31)
CREAT SERPL-MCNC: 0.8 MG/DL — SIGNIFICANT CHANGE UP (ref 0.5–1.3)
CREAT SERPL-MCNC: 0.8 MG/DL — SIGNIFICANT CHANGE UP (ref 0.5–1.3)
GLUCOSE SERPL-MCNC: 138 MG/DL — HIGH (ref 70–99)
GLUCOSE SERPL-MCNC: 144 MG/DL — HIGH (ref 70–99)
GRAM STN FLD: SIGNIFICANT CHANGE UP
HCT VFR BLD CALC: 28.7 % — LOW (ref 39–50)
HGB BLD-MCNC: 8.6 G/DL — LOW (ref 13–17)
MAGNESIUM SERPL-MCNC: 2.6 MG/DL — SIGNIFICANT CHANGE UP (ref 1.6–2.6)
MAGNESIUM SERPL-MCNC: 2.7 MG/DL — HIGH (ref 1.6–2.6)
MCHC RBC-ENTMCNC: 29.7 PG — SIGNIFICANT CHANGE UP (ref 27–34)
MCHC RBC-ENTMCNC: 30 G/DL — LOW (ref 32–36)
MCV RBC AUTO: 99 FL — SIGNIFICANT CHANGE UP (ref 80–100)
PHOSPHATE SERPL-MCNC: 2.6 MG/DL — SIGNIFICANT CHANGE UP (ref 2.5–4.5)
PLATELET # BLD AUTO: 139 K/UL — LOW (ref 150–400)
POTASSIUM SERPL-MCNC: 3.6 MMOL/L — SIGNIFICANT CHANGE UP (ref 3.5–5.3)
POTASSIUM SERPL-MCNC: 3.8 MMOL/L — SIGNIFICANT CHANGE UP (ref 3.5–5.3)
POTASSIUM SERPL-SCNC: 3.6 MMOL/L — SIGNIFICANT CHANGE UP (ref 3.5–5.3)
POTASSIUM SERPL-SCNC: 3.8 MMOL/L — SIGNIFICANT CHANGE UP (ref 3.5–5.3)
RBC # BLD: 2.9 M/UL — LOW (ref 4.2–5.8)
RBC # FLD: 13.9 % — SIGNIFICANT CHANGE UP (ref 10.3–16.9)
RH IG SCN BLD-IMP: POSITIVE — SIGNIFICANT CHANGE UP
SODIUM SERPL-SCNC: 151 MMOL/L — HIGH (ref 135–145)
SODIUM SERPL-SCNC: 152 MMOL/L — HIGH (ref 135–145)
SPECIMEN SOURCE: SIGNIFICANT CHANGE UP
WBC # BLD: 7 K/UL — SIGNIFICANT CHANGE UP (ref 3.8–10.5)
WBC # FLD AUTO: 7 K/UL — SIGNIFICANT CHANGE UP (ref 3.8–10.5)

## 2017-08-18 PROCEDURE — 93010 ELECTROCARDIOGRAM REPORT: CPT

## 2017-08-18 PROCEDURE — 99291 CRITICAL CARE FIRST HOUR: CPT

## 2017-08-18 PROCEDURE — 71010: CPT | Mod: 26

## 2017-08-18 RX ORDER — DEXMEDETOMIDINE HYDROCHLORIDE IN 0.9% SODIUM CHLORIDE 4 UG/ML
0.2 INJECTION INTRAVENOUS
Qty: 200 | Refills: 0 | Status: DISCONTINUED | OUTPATIENT
Start: 2017-08-18 | End: 2017-08-21

## 2017-08-18 RX ORDER — DEXMEDETOMIDINE HYDROCHLORIDE IN 0.9% SODIUM CHLORIDE 4 UG/ML
0.2 INJECTION INTRAVENOUS
Qty: 200 | Refills: 0 | Status: DISCONTINUED | OUTPATIENT
Start: 2017-08-18 | End: 2017-08-18

## 2017-08-18 RX ORDER — MIDODRINE HYDROCHLORIDE 2.5 MG/1
10 TABLET ORAL THREE TIMES A DAY
Qty: 0 | Refills: 0 | Status: DISCONTINUED | OUTPATIENT
Start: 2017-08-18 | End: 2017-08-19

## 2017-08-18 RX ADMIN — Medication 50 MILLIGRAM(S): at 08:58

## 2017-08-18 RX ADMIN — INSULIN GLARGINE 10 UNIT(S): 100 INJECTION, SOLUTION SUBCUTANEOUS at 22:05

## 2017-08-18 RX ADMIN — DEXMEDETOMIDINE HYDROCHLORIDE IN 0.9% SODIUM CHLORIDE 3.52 MICROGRAM(S)/KG/HR: 4 INJECTION INTRAVENOUS at 18:28

## 2017-08-18 RX ADMIN — ATORVASTATIN CALCIUM 40 MILLIGRAM(S): 80 TABLET, FILM COATED ORAL at 22:07

## 2017-08-18 RX ADMIN — CHLORHEXIDINE GLUCONATE 15 MILLILITER(S): 213 SOLUTION TOPICAL at 18:27

## 2017-08-18 RX ADMIN — MEROPENEM 100 MILLIGRAM(S): 1 INJECTION INTRAVENOUS at 22:05

## 2017-08-18 RX ADMIN — Medication 3 MILLILITER(S): at 17:25

## 2017-08-18 RX ADMIN — Medication 50 MILLIGRAM(S): at 03:11

## 2017-08-18 RX ADMIN — CHLORHEXIDINE GLUCONATE 15 MILLILITER(S): 213 SOLUTION TOPICAL at 06:37

## 2017-08-18 RX ADMIN — Medication 3 MILLILITER(S): at 01:08

## 2017-08-18 RX ADMIN — Medication 50 MILLIGRAM(S): at 15:34

## 2017-08-18 RX ADMIN — LEVETIRACETAM 1500 MILLIGRAM(S): 250 TABLET, FILM COATED ORAL at 06:35

## 2017-08-18 RX ADMIN — QUETIAPINE FUMARATE 150 MILLIGRAM(S): 200 TABLET, FILM COATED ORAL at 22:07

## 2017-08-18 RX ADMIN — QUETIAPINE FUMARATE 150 MILLIGRAM(S): 200 TABLET, FILM COATED ORAL at 15:32

## 2017-08-18 RX ADMIN — HEPARIN SODIUM 5000 UNIT(S): 5000 INJECTION INTRAVENOUS; SUBCUTANEOUS at 22:06

## 2017-08-18 RX ADMIN — OXCARBAZEPINE 300 MILLIGRAM(S): 300 TABLET, FILM COATED ORAL at 13:02

## 2017-08-18 RX ADMIN — QUETIAPINE FUMARATE 150 MILLIGRAM(S): 200 TABLET, FILM COATED ORAL at 06:36

## 2017-08-18 RX ADMIN — GABAPENTIN 300 MILLIGRAM(S): 400 CAPSULE ORAL at 18:28

## 2017-08-18 RX ADMIN — Medication 10 MILLIGRAM(S): at 12:01

## 2017-08-18 RX ADMIN — Medication 2: at 12:13

## 2017-08-18 RX ADMIN — Medication 3 MILLILITER(S): at 13:22

## 2017-08-18 RX ADMIN — Medication 3 MILLILITER(S): at 21:40

## 2017-08-18 RX ADMIN — GABAPENTIN 300 MILLIGRAM(S): 400 CAPSULE ORAL at 06:36

## 2017-08-18 RX ADMIN — HALOPERIDOL DECANOATE 2 MILLIGRAM(S): 100 INJECTION INTRAMUSCULAR at 06:36

## 2017-08-18 RX ADMIN — Medication 2: at 23:16

## 2017-08-18 RX ADMIN — MEROPENEM 100 MILLIGRAM(S): 1 INJECTION INTRAVENOUS at 08:58

## 2017-08-18 RX ADMIN — CITALOPRAM 20 MILLIGRAM(S): 10 TABLET, FILM COATED ORAL at 12:01

## 2017-08-18 RX ADMIN — Medication 3 MILLILITER(S): at 10:23

## 2017-08-18 RX ADMIN — Medication 2: at 00:01

## 2017-08-18 RX ADMIN — Medication 50 MILLIGRAM(S): at 22:06

## 2017-08-18 RX ADMIN — Medication 3 MILLILITER(S): at 05:02

## 2017-08-18 RX ADMIN — OXCARBAZEPINE 450 MILLIGRAM(S): 300 TABLET, FILM COATED ORAL at 22:06

## 2017-08-18 RX ADMIN — HEPARIN SODIUM 5000 UNIT(S): 5000 INJECTION INTRAVENOUS; SUBCUTANEOUS at 15:31

## 2017-08-18 RX ADMIN — PANTOPRAZOLE SODIUM 40 MILLIGRAM(S): 20 TABLET, DELAYED RELEASE ORAL at 12:03

## 2017-08-18 RX ADMIN — LEVETIRACETAM 1500 MILLIGRAM(S): 250 TABLET, FILM COATED ORAL at 18:27

## 2017-08-18 RX ADMIN — HEPARIN SODIUM 5000 UNIT(S): 5000 INJECTION INTRAVENOUS; SUBCUTANEOUS at 06:37

## 2017-08-18 RX ADMIN — HALOPERIDOL DECANOATE 2 MILLIGRAM(S): 100 INJECTION INTRAMUSCULAR at 18:28

## 2017-08-18 NOTE — PROGRESS NOTE ADULT - SUBJECTIVE AND OBJECTIVE BOX
INTERVAL HPI/OVERNIGHT EVENTS:    SUBJECTIVE: Patient seen and examined at bedside.    OBJECTIVE:    VITAL SIGNS:  ICU Vital Signs Last 24 Hrs  T(C): 37.2 (18 Aug 2017 01:14), Max: 37.6 (17 Aug 2017 13:00)  T(F): 98.9 (18 Aug 2017 01:14), Max: 99.6 (17 Aug 2017 13:00)  HR: 72 (18 Aug 2017 05:00) (56 - 82)  BP: 100/57 (18 Aug 2017 05:00) (73/42 - 128/65)  BP(mean): 73 (18 Aug 2017 05:00) (56 - 88)  RR: 16 (18 Aug 2017 05:00) (0 - 17)  SpO2: 98% (18 Aug 2017 05:00) (96% - 100%)    Mode: AC/ CMV (Assist Control/ Continuous Mandatory Ventilation)  RR (machine): 10  TV (machine): 450  FiO2: 50  PEEP: 5  ITime: 1  MAP: 8.5  PIP: 20    08-16 @ 07:01  -  08-17 @ 07:00  --------------------------------------------------------  IN: 1378 mL / OUT: 941 mL / NET: 437 mL    08-17 @ 07:01  -  08-18 @ 06:19  --------------------------------------------------------  IN: 4627.3 mL / OUT: 1090 mL / NET: 3537.3 mL      CAPILLARY BLOOD GLUCOSE  191 (18 Aug 2017 00:00)          PHYSICAL EXAM:    General: NAD  HEENT: NC/AT; PERRL, clear conjunctiva  Neck: supple  Respiratory: CTA b/l  Cardiovascular: +S1/S2; RRR  Abdomen: soft, NT/ND; +BS x4  Extremities: WWP, 2+ peripheral pulses b/l; no LE edema  Skin: normal color and turgor; no rash  Neurological:     MEDICATIONS:  MEDICATIONS  (STANDING):  vancomycin  IVPB 1000 milliGRAM(s) IV Intermittent every 24 hours  citalopram 20 milliGRAM(s) Oral daily  atorvastatin 40 milliGRAM(s) Oral at bedtime  busPIRone 10 milliGRAM(s) Oral daily  haloperidol     Tablet 2 milliGRAM(s) Oral two times a day  gabapentin 300 milliGRAM(s) Oral two times a day  ALBUTerol/ipratropium for Nebulization 3 milliLiter(s) Nebulizer every 4 hours  pantoprazole  Injectable 40 milliGRAM(s) IV Push daily  chlorhexidine 0.12% Liquid 15 milliLiter(s) Swish and Spit two times a day  QUEtiapine 150 milliGRAM(s) Oral every 8 hours  levETIRAcetam  Solution 1500 milliGRAM(s) Oral two times a day  OXcarbazepine Suspension 300 milliGRAM(s) Oral daily  OXcarbazepine Suspension 450 milliGRAM(s) Oral at bedtime  heparin  Injectable 5000 Unit(s) SubCutaneous every 8 hours  hydrocortisone sodium succinate Injectable 50 milliGRAM(s) IV Push every 6 hours  meropenem IVPB 1000 milliGRAM(s) IV Intermittent every 12 hours  propofol Infusion 5 MICROgram(s)/kG/Min (2.109 mL/Hr) IV Continuous <Continuous>  insulin lispro (HumaLOG) corrective regimen sliding scale   SubCutaneous every 6 hours  dextrose 5%. 1000 milliLiter(s) (50 mL/Hr) IV Continuous <Continuous>  dextrose 50% Injectable 12.5 Gram(s) IV Push once  dextrose 50% Injectable 25 Gram(s) IV Push once  dextrose 50% Injectable 25 Gram(s) IV Push once  norepinephrine Infusion 0.01 MICROgram(s)/kG/Min (1.318 mL/Hr) IV Continuous <Continuous>  insulin glargine Injectable (LANTUS) 10 Unit(s) SubCutaneous at bedtime    MEDICATIONS  (PRN):  dextrose Gel 1 Dose(s) Oral once PRN Blood Glucose LESS THAN 70 milliGRAM(s)/deciliter  glucagon  Injectable 1 milliGRAM(s) IntraMuscular once PRN Glucose LESS THAN 70 milligrams/deciliter      ALLERGIES:  Allergies    No Known Allergies    Intolerances        LABS:                        8.4    7.8   )-----------( 138      ( 17 Aug 2017 22:32 )             26.6     08-17    149<H>  |  107  |  49<H>  ----------------------------<  197<H>  4.0   |  33<H>  |  0.80    Ca    8.0<L>      17 Aug 2017 22:32  Phos  1.3     08-17  Mg     2.5     08-17    TPro  7.6  /  Alb  3.2<L>  /  TBili  0.3  /  DBili  x   /  AST  40  /  ALT  86<H>  /  AlkPhos  164<H>  08-16    PT/INR - ( 16 Aug 2017 19:50 )   PT: 12.2 sec;   INR: 1.10          PTT - ( 16 Aug 2017 19:50 )  PTT:26.7 sec  Urinalysis Basic - ( 16 Aug 2017 20:52 )    Color: Yellow / Appearance: Cloudy / SG: <=1.005 / pH: x  Gluc: x / Ketone: NEGATIVE  / Bili: NEGATIVE / Urobili: 0.2 E.U./dL   Blood: x / Protein: 30 mg/dL / Nitrite: NEGATIVE   Leuk Esterase: Moderate / RBC: > 10 /HPF / WBC Many /HPF   Sq Epi: x / Non Sq Epi: Few /HPF / Bacteria: Present /HPF        RADIOLOGY & ADDITIONAL TESTS: Reviewed. INTERVAL HPI/OVERNIGHT EVENTS: Good UOP 40-60cc/hr. Maintained on levophed 0.02. Hgb dropped to 8.4 but HDS. Continued on sedation w/ propofol o/n, however d/c'd at 6AM as pt was too sedated after Seroquel.     SUBJECTIVE: Patient seen and examined at bedside this AM. Pt intubated, sedated, nonresponsive to verbal, tactile, or painful stimuli. Does not withdraw to pain. Appears comfortable, breathing over vent.    OBJECTIVE:  VITAL SIGNS:  ICU Vital Signs Last 24 Hrs  T(C): 37.2 (18 Aug 2017 01:14), Max: 37.6 (17 Aug 2017 13:00)  T(F): 98.9 (18 Aug 2017 01:14), Max: 99.6 (17 Aug 2017 13:00)  HR: 72 (18 Aug 2017 05:00) (56 - 82)  BP: 100/57 (18 Aug 2017 05:00) (73/42 - 128/65)  BP(mean): 73 (18 Aug 2017 05:00) (56 - 88)  RR: 16 (18 Aug 2017 05:00) (0 - 17)  SpO2: 98% (18 Aug 2017 05:00) (96% - 100%)    Mode: AC/ CMV (Assist Control/ Continuous Mandatory Ventilation)  RR (machine): 10  TV (machine): 450  FiO2: 50  PEEP: 5  ITime: 1  MAP: 8.5  PIP: 20    08-16 @ 07:01 - 08-17 @ 07:00  --------------------------------------------------------  IN: 1378 mL / OUT: 941 mL / NET: 437 mL    08-17 @ 07:01  -  08-18 @ 06:19  --------------------------------------------------------  IN: 4627.3 mL / OUT: 1090 mL / NET: 3537.3 mL      CAPILLARY BLOOD GLUCOSE  191 (18 Aug 2017 00:00)      PHYSICAL EXAM:    General: intubated, sedated, looks comfortable, non-responsive to verbal/tactile/painful stimuli  HEENT: pupils sluggishly reactive to light; dry MM  Respiratory: diffuse rhonchi present b/l  Cardiovascular: +S1/S2; RRR; no M/R/G  Abdomen: PEG in place, w/ dressing c/d/i; soft; BS present  Extremities: significant contractures of b/l UE; extremities warm to touch; no LE edema  Neurological: AAOx0. Does not respond to verbal stimuli. Does not respond to tactile stimuli including pain in extremities and sternal rub. pupils sluggishly reactive. +corneal reflex.    MEDICATIONS:  MEDICATIONS  (STANDING):  vancomycin  IVPB 1000 milliGRAM(s) IV Intermittent every 24 hours  citalopram 20 milliGRAM(s) Oral daily  atorvastatin 40 milliGRAM(s) Oral at bedtime  busPIRone 10 milliGRAM(s) Oral daily  haloperidol     Tablet 2 milliGRAM(s) Oral two times a day  gabapentin 300 milliGRAM(s) Oral two times a day  ALBUTerol/ipratropium for Nebulization 3 milliLiter(s) Nebulizer every 4 hours  pantoprazole  Injectable 40 milliGRAM(s) IV Push daily  chlorhexidine 0.12% Liquid 15 milliLiter(s) Swish and Spit two times a day  QUEtiapine 150 milliGRAM(s) Oral every 8 hours  levETIRAcetam  Solution 1500 milliGRAM(s) Oral two times a day  OXcarbazepine Suspension 300 milliGRAM(s) Oral daily  OXcarbazepine Suspension 450 milliGRAM(s) Oral at bedtime  heparin  Injectable 5000 Unit(s) SubCutaneous every 8 hours  hydrocortisone sodium succinate Injectable 50 milliGRAM(s) IV Push every 6 hours  meropenem IVPB 1000 milliGRAM(s) IV Intermittent every 12 hours  propofol Infusion 5 MICROgram(s)/kG/Min (2.109 mL/Hr) IV Continuous <Continuous>  insulin lispro (HumaLOG) corrective regimen sliding scale   SubCutaneous every 6 hours  dextrose 5%. 1000 milliLiter(s) (50 mL/Hr) IV Continuous <Continuous>  dextrose 50% Injectable 12.5 Gram(s) IV Push once  dextrose 50% Injectable 25 Gram(s) IV Push once  dextrose 50% Injectable 25 Gram(s) IV Push once  norepinephrine Infusion 0.01 MICROgram(s)/kG/Min (1.318 mL/Hr) IV Continuous <Continuous>  insulin glargine Injectable (LANTUS) 10 Unit(s) SubCutaneous at bedtime    MEDICATIONS  (PRN):  dextrose Gel 1 Dose(s) Oral once PRN Blood Glucose LESS THAN 70 milliGRAM(s)/deciliter  glucagon  Injectable 1 milliGRAM(s) IntraMuscular once PRN Glucose LESS THAN 70 milligrams/deciliter      ALLERGIES:  Allergies    No Known Allergies    Intolerances        LABS:                        8.4    7.8   )-----------( 138      ( 17 Aug 2017 22:32 )             26.6     08-17    149<H>  |  107  |  49<H>  ----------------------------<  197<H>  4.0   |  33<H>  |  0.80    Ca    8.0<L>      17 Aug 2017 22:32  Phos  1.3     08-17  Mg     2.5     08-17    TPro  7.6  /  Alb  3.2<L>  /  TBili  0.3  /  DBili  x   /  AST  40  /  ALT  86<H>  /  AlkPhos  164<H>  08-16    PT/INR - ( 16 Aug 2017 19:50 )   PT: 12.2 sec;   INR: 1.10          PTT - ( 16 Aug 2017 19:50 )  PTT:26.7 sec  Urinalysis Basic - ( 16 Aug 2017 20:52 )    Color: Yellow / Appearance: Cloudy / SG: <=1.005 / pH: x  Gluc: x / Ketone: NEGATIVE  / Bili: NEGATIVE / Urobili: 0.2 E.U./dL   Blood: x / Protein: 30 mg/dL / Nitrite: NEGATIVE   Leuk Esterase: Moderate / RBC: > 10 /HPF / WBC Many /HPF   Sq Epi: x / Non Sq Epi: Few /HPF / Bacteria: Present /HPF        RADIOLOGY & ADDITIONAL TESTS: Reviewed.

## 2017-08-18 NOTE — PROGRESS NOTE ADULT - ASSESSMENT
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 PNA now s/p intubation for airway protection, admitted to MICU for monitoring.     NEURO  #AMS   -Likely 2/2 sepsis superimposed on baseline AD. Family reports that at baseline patient is alert and oriented.  CT not performed because of similar episodes that were due to infection.  -c/w abx to treat sepsis and achieve source control  #Seizure  -Pt does not appear to be having clinical seizures at this time, likely not the etiology of AMS  -c/w home Keppra  -c/w home Tripleptal    CV  #Severe Sepsis 2/2 UTI vs. PNA   -Pt hypotensive w/ elevated lactate and likely source of infection urinary tract vs. PNA (hx aspiration pneumonitis)  -blood cx's growing Klebsiella, sensitivities to follow  -repeat cx's drawn today  -f/u urine, sputum cx's to determine source  -c/w NS @ 120/hr for fluid resuscitation (pt also received total 2L in NS bolus today w/o significant improvement in pressures)  -c/w NE @ 0.02 mcg/kg/min for pressure support  -c/w meropenem and vancomycin   -lactate downtrended    RESPIRATORY  #Hypercapnic Respiratory Failure s/p intubation  -Repeat VBG shows improvement of hypercapnia and oxygenation.    -continue on ventilator until mental status improves, currently off sedation but still w/ no response then trial of CPAP   -will need to evaluate ability to to protect airway with questionable hx of tracheal deviation    GI  #Constipation/Diarrhea  -monitor for BM's     ID  #Klebsiella bacteremia  -Pt w/ hx of aspiration pneumonitis and ESBL UTI. CXR shows pleural effusions but no definitive infiltrate.  -f/u blood cx, urine cx  -c/w imipenem because of hx of ESBL  -vancomycin for HAP coverage    ENDO  #HHS (resolved)  -2/2 steroid use, blood glucose of 685 on admission.  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     NEURO  #AMS   -Likely 2/2 sepsis superimposed on baseline AD. Family reports that at baseline patient is alert and oriented.  CT not performed because of similar episodes that were due to infection.  -c/w abx to treat sepsis and achieve source control, as noted below  -c/w Seroquel 150 q8 for agitation  #Seizure  -Pt does not appear to be having clinical seizures at this time, likely not the etiology of AMS  -c/w home Keppra  -c/w home Tripleptal    CV  #Severe Sepsis 2/2 UTI vs. PNA   -Pt hypotensive w/ elevated lactate and likely source of infection urinary tract vs. PNA (hx aspiration pneumonitis)  -blood cx's growing Klebsiella, sensitivities to follow  -Urine also w/ GNR today, likely the source of bacteremia  -repeat blood cx's drawn 8/17 negative to date  -to d/c NE mcg/kg/min today and monitor pressures  -c/w meropenem as likely source is UTI and do not need Vancomycin  #Hypotension  -Likely 2/2 severe sepsis, currently off Levo and pressures have been maintained  -continue to tx underlying infection  -c/w stress dose steroids 50 hydrocortisone q6    RESPIRATORY  #Hypercapnic Respiratory Failure s/p intubation  -Repeat VBG shows improvement of hypercapnia and oxygenation.    -continue on ventilator until mental status improves, currently off sedation but still w/ no response then trial of CPAP   -will need to evaluate ability to protect airway with questionable hx of tracheal deviation    GI  #Constipation/Diarrhea  -monitor for BM's     ID  #Klebsiella bacteremia  -Pt w/ hx of aspiration pneumonitis and ESBL UTI. CXR shows pleural effusions but no definitive infiltrate.  -f/u final sensitivities and de-escalate as appropriate  -c/w meropenem because of hx of ESBL  -do not need vanc anymore, as likely source is UTI     ENDO  #HHS (resolved)  -2/2 steroid use, blood glucose of 685 on admission.  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     NEURO  #AMS   -Likely 2/2 sepsis superimposed on baseline AD. Family reports that at baseline patient is alert and oriented.  CT not performed because of similar episodes that were due to infection.  -c/w abx to treat sepsis and achieve source control, as noted below  -c/w Seroquel 150 q8 for agitation  #Seizure  -Pt does not appear to be having clinical seizures at this time, likely not the etiology of AMS  -c/w home Keppra  -c/w home Tripleptal    CV  #Severe Sepsis 2/2 UTI vs. PNA   -Pt hypotensive w/ elevated lactate and likely source of infection urinary tract vs. PNA (hx aspiration pneumonitis)  -blood cx's growing Klebsiella, sensitivities to follow  -Urine also w/ GNR today, likely the source of bacteremia  -repeat blood cx's drawn 8/17 negative to date  -to d/c NE mcg/kg/min today and monitor pressures  -c/w meropenem as likely source is UTI and do not need Vancomycin  #Hypotension  -Likely 2/2 severe sepsis, currently off Levo and pressures have been maintained  -continue to tx underlying infection  -c/w stress dose steroids 50 hydrocortisone q6    RESPIRATORY  #Hypercapnic Respiratory Failure s/p intubation  -Repeat VBG shows improvement of hypercapnia and oxygenation.    -continue on ventilator until mental status improves, currently off sedation but still w/ no response then trial of CPAP   -will need to evaluate ability to protect airway with questionable hx of tracheal deviation    GI  #Constipation/Diarrhea  -monitor for BM's     ID  #Klebsiella bacteremia  -Pt w/ hx of aspiration pneumonitis and ESBL UTI. CXR shows pleural effusions but no definitive infiltrate.  -f/u final sensitivities and de-escalate as appropriate  -c/w meropenem because of hx of ESBL  -do not need vanc anymore, as likely source is UTI     ENDO  #HHS (resolved)  -2/2 steroid use, blood glucose of 685 on admission.  -insulin gtt d/c'd today as glucose downtrended  -c/w IVF  -transitioned to FSG/SSI + Lantus 10u at bedtime    FEN  #Fluids: none  #Monitor, Replete to K>4 and Mg>2  #Diet: Glucerna    PPX  -SQH  -Protonix    Code Status: FULL CODE

## 2017-08-18 NOTE — DIETITIAN INITIAL EVALUATION ADULT. - ENERGY NEEDS
BMI:23.6,IBW:154lbs+/-10%.Skin stage 1 on buttocks reported due to redness.No N/V/D or pain.Needs for intubation/enteral feeds(25-30kcal and 1.4gmprotein)@IBW needs.

## 2017-08-18 NOTE — DIETITIAN INITIAL EVALUATION ADULT. - OTHER INFO
89 y/o male admitted with SOB.Intubated on propofol(2.1ml)=55kcal.As per daughter,patient was getting 3 cans of Jevity 1.5 a day bolus.Reported was giving 1/2 can with 120cc water at a time.

## 2017-08-19 LAB
-  AMIKACIN: SIGNIFICANT CHANGE UP
-  AMIKACIN: SIGNIFICANT CHANGE UP
-  AMPICILLIN/SULBACTAM: SIGNIFICANT CHANGE UP
-  AMPICILLIN: SIGNIFICANT CHANGE UP
-  AZTREONAM: SIGNIFICANT CHANGE UP
-  CEFAZOLIN: SIGNIFICANT CHANGE UP
-  CEFTAZIDIME: SIGNIFICANT CHANGE UP
-  CEFTRIAXONE: SIGNIFICANT CHANGE UP
-  CIPROFLOXACIN: SIGNIFICANT CHANGE UP
-  GENTAMICIN: SIGNIFICANT CHANGE UP
-  IMIPENEM: SIGNIFICANT CHANGE UP
-  NITROFURANTOIN: SIGNIFICANT CHANGE UP
-  NITROFURANTOIN: SIGNIFICANT CHANGE UP
-  PIPERACILLIN/TAZOBACTAM: SIGNIFICANT CHANGE UP
-  TOBRAMYCIN: SIGNIFICANT CHANGE UP
-  TRIMETHOPRIM/SULFAMETHOXAZOLE: SIGNIFICANT CHANGE UP
ANION GAP SERPL CALC-SCNC: 10 MMOL/L — SIGNIFICANT CHANGE UP (ref 5–17)
BUN SERPL-MCNC: 36 MG/DL — HIGH (ref 7–23)
CALCIUM SERPL-MCNC: 8.4 MG/DL — SIGNIFICANT CHANGE UP (ref 8.4–10.5)
CHLORIDE SERPL-SCNC: 105 MMOL/L — SIGNIFICANT CHANGE UP (ref 96–108)
CO2 SERPL-SCNC: 33 MMOL/L — HIGH (ref 22–31)
CREAT SERPL-MCNC: 0.7 MG/DL — SIGNIFICANT CHANGE UP (ref 0.5–1.3)
CULTURE RESULTS: SIGNIFICANT CHANGE UP
GLUCOSE SERPL-MCNC: 255 MG/DL — HIGH (ref 70–99)
GRAM STN FLD: SIGNIFICANT CHANGE UP
HCT VFR BLD CALC: 28.1 % — LOW (ref 39–50)
HGB BLD-MCNC: 8.6 G/DL — LOW (ref 13–17)
LYMPHOCYTES # BLD AUTO: 8 % — LOW (ref 13–44)
MAGNESIUM SERPL-MCNC: 2.6 MG/DL — SIGNIFICANT CHANGE UP (ref 1.6–2.6)
MCHC RBC-ENTMCNC: 29.9 PG — SIGNIFICANT CHANGE UP (ref 27–34)
MCHC RBC-ENTMCNC: 30.6 G/DL — LOW (ref 32–36)
MCV RBC AUTO: 97.6 FL — SIGNIFICANT CHANGE UP (ref 80–100)
METHOD TYPE: SIGNIFICANT CHANGE UP
MONOCYTES NFR BLD AUTO: 2.7 % — SIGNIFICANT CHANGE UP (ref 2–14)
NEUTROPHILS NFR BLD AUTO: 89.3 % — HIGH (ref 43–77)
ORGANISM # SPEC MICROSCOPIC CNT: SIGNIFICANT CHANGE UP
PHOSPHATE SERPL-MCNC: 2.9 MG/DL — SIGNIFICANT CHANGE UP (ref 2.5–4.5)
PLATELET # BLD AUTO: 125 K/UL — LOW (ref 150–400)
POTASSIUM SERPL-MCNC: 3.6 MMOL/L — SIGNIFICANT CHANGE UP (ref 3.5–5.3)
POTASSIUM SERPL-SCNC: 3.6 MMOL/L — SIGNIFICANT CHANGE UP (ref 3.5–5.3)
RBC # BLD: 2.88 M/UL — LOW (ref 4.2–5.8)
RBC # FLD: 13.7 % — SIGNIFICANT CHANGE UP (ref 10.3–16.9)
SODIUM SERPL-SCNC: 148 MMOL/L — HIGH (ref 135–145)
SPECIMEN SOURCE: SIGNIFICANT CHANGE UP
WBC # BLD: 5.1 K/UL — SIGNIFICANT CHANGE UP (ref 3.8–10.5)
WBC # FLD AUTO: 5.1 K/UL — SIGNIFICANT CHANGE UP (ref 3.8–10.5)

## 2017-08-19 PROCEDURE — 99291 CRITICAL CARE FIRST HOUR: CPT

## 2017-08-19 PROCEDURE — 71010: CPT | Mod: 26

## 2017-08-19 RX ORDER — MIDODRINE HYDROCHLORIDE 2.5 MG/1
5 TABLET ORAL EVERY 8 HOURS
Qty: 0 | Refills: 0 | Status: DISCONTINUED | OUTPATIENT
Start: 2017-08-19 | End: 2017-08-22

## 2017-08-19 RX ORDER — INSULIN GLARGINE 100 [IU]/ML
14 INJECTION, SOLUTION SUBCUTANEOUS AT BEDTIME
Qty: 0 | Refills: 0 | Status: DISCONTINUED | OUTPATIENT
Start: 2017-08-19 | End: 2017-08-22

## 2017-08-19 RX ORDER — POTASSIUM CHLORIDE 20 MEQ
40 PACKET (EA) ORAL ONCE
Qty: 0 | Refills: 0 | Status: COMPLETED | OUTPATIENT
Start: 2017-08-19 | End: 2017-08-19

## 2017-08-19 RX ORDER — HYDROCORTISONE 20 MG
50 TABLET ORAL EVERY 12 HOURS
Qty: 0 | Refills: 0 | Status: DISCONTINUED | OUTPATIENT
Start: 2017-08-19 | End: 2017-08-20

## 2017-08-19 RX ADMIN — Medication 50 MILLIGRAM(S): at 18:37

## 2017-08-19 RX ADMIN — Medication 2: at 23:20

## 2017-08-19 RX ADMIN — OXCARBAZEPINE 300 MILLIGRAM(S): 300 TABLET, FILM COATED ORAL at 11:50

## 2017-08-19 RX ADMIN — QUETIAPINE FUMARATE 150 MILLIGRAM(S): 200 TABLET, FILM COATED ORAL at 22:26

## 2017-08-19 RX ADMIN — Medication 10 MILLIGRAM(S): at 11:50

## 2017-08-19 RX ADMIN — HEPARIN SODIUM 5000 UNIT(S): 5000 INJECTION INTRAVENOUS; SUBCUTANEOUS at 22:29

## 2017-08-19 RX ADMIN — HEPARIN SODIUM 5000 UNIT(S): 5000 INJECTION INTRAVENOUS; SUBCUTANEOUS at 13:30

## 2017-08-19 RX ADMIN — Medication 3 MILLILITER(S): at 05:54

## 2017-08-19 RX ADMIN — CHLORHEXIDINE GLUCONATE 15 MILLILITER(S): 213 SOLUTION TOPICAL at 05:12

## 2017-08-19 RX ADMIN — INSULIN GLARGINE 14 UNIT(S): 100 INJECTION, SOLUTION SUBCUTANEOUS at 23:11

## 2017-08-19 RX ADMIN — MEROPENEM 100 MILLIGRAM(S): 1 INJECTION INTRAVENOUS at 22:26

## 2017-08-19 RX ADMIN — ATORVASTATIN CALCIUM 40 MILLIGRAM(S): 80 TABLET, FILM COATED ORAL at 22:26

## 2017-08-19 RX ADMIN — DEXMEDETOMIDINE HYDROCHLORIDE IN 0.9% SODIUM CHLORIDE 3.52 MICROGRAM(S)/KG/HR: 4 INJECTION INTRAVENOUS at 08:00

## 2017-08-19 RX ADMIN — MIDODRINE HYDROCHLORIDE 10 MILLIGRAM(S): 2.5 TABLET ORAL at 13:24

## 2017-08-19 RX ADMIN — CITALOPRAM 20 MILLIGRAM(S): 10 TABLET, FILM COATED ORAL at 11:51

## 2017-08-19 RX ADMIN — Medication 50 MILLIGRAM(S): at 04:00

## 2017-08-19 RX ADMIN — HEPARIN SODIUM 5000 UNIT(S): 5000 INJECTION INTRAVENOUS; SUBCUTANEOUS at 05:12

## 2017-08-19 RX ADMIN — Medication 2: at 18:37

## 2017-08-19 RX ADMIN — PANTOPRAZOLE SODIUM 40 MILLIGRAM(S): 20 TABLET, DELAYED RELEASE ORAL at 11:50

## 2017-08-19 RX ADMIN — QUETIAPINE FUMARATE 150 MILLIGRAM(S): 200 TABLET, FILM COATED ORAL at 05:12

## 2017-08-19 RX ADMIN — LEVETIRACETAM 1500 MILLIGRAM(S): 250 TABLET, FILM COATED ORAL at 18:36

## 2017-08-19 RX ADMIN — Medication 3 MILLILITER(S): at 01:43

## 2017-08-19 RX ADMIN — Medication 3 MILLILITER(S): at 17:00

## 2017-08-19 RX ADMIN — GABAPENTIN 300 MILLIGRAM(S): 400 CAPSULE ORAL at 18:36

## 2017-08-19 RX ADMIN — Medication 50 MILLIGRAM(S): at 10:05

## 2017-08-19 RX ADMIN — Medication 3 MILLILITER(S): at 09:38

## 2017-08-19 RX ADMIN — Medication 3 MILLILITER(S): at 14:00

## 2017-08-19 RX ADMIN — Medication 4: at 05:11

## 2017-08-19 RX ADMIN — HALOPERIDOL DECANOATE 2 MILLIGRAM(S): 100 INJECTION INTRAMUSCULAR at 18:36

## 2017-08-19 RX ADMIN — DEXMEDETOMIDINE HYDROCHLORIDE IN 0.9% SODIUM CHLORIDE 3.52 MICROGRAM(S)/KG/HR: 4 INJECTION INTRAVENOUS at 01:30

## 2017-08-19 RX ADMIN — LEVETIRACETAM 1500 MILLIGRAM(S): 250 TABLET, FILM COATED ORAL at 05:13

## 2017-08-19 RX ADMIN — HALOPERIDOL DECANOATE 2 MILLIGRAM(S): 100 INJECTION INTRAMUSCULAR at 05:12

## 2017-08-19 RX ADMIN — Medication 40 MILLIEQUIVALENT(S): at 06:45

## 2017-08-19 RX ADMIN — Medication 3 MILLILITER(S): at 22:28

## 2017-08-19 RX ADMIN — MIDODRINE HYDROCHLORIDE 5 MILLIGRAM(S): 2.5 TABLET ORAL at 22:26

## 2017-08-19 RX ADMIN — OXCARBAZEPINE 450 MILLIGRAM(S): 300 TABLET, FILM COATED ORAL at 22:26

## 2017-08-19 RX ADMIN — MIDODRINE HYDROCHLORIDE 10 MILLIGRAM(S): 2.5 TABLET ORAL at 05:12

## 2017-08-19 RX ADMIN — QUETIAPINE FUMARATE 150 MILLIGRAM(S): 200 TABLET, FILM COATED ORAL at 13:28

## 2017-08-19 RX ADMIN — GABAPENTIN 300 MILLIGRAM(S): 400 CAPSULE ORAL at 05:12

## 2017-08-19 RX ADMIN — CHLORHEXIDINE GLUCONATE 15 MILLILITER(S): 213 SOLUTION TOPICAL at 18:36

## 2017-08-19 RX ADMIN — MEROPENEM 100 MILLIGRAM(S): 1 INJECTION INTRAVENOUS at 08:55

## 2017-08-19 NOTE — PROCEDURE NOTE - NSPROCDETAILS_GEN_ALL_CORE
guidewire recovered/sterile technique, catheter placed/sterile dressing applied/ultrasound guidance/lumen(s) aspirated and flushed

## 2017-08-19 NOTE — PROGRESS NOTE ADULT - ASSESSMENT
A -respiratory failure/urosepsis/ aspiration/dementia/hypernatremia    Suggest  maintain on mv, poor cough menatal status second intubation in past month, unlikely to protect airway  trach discussed with family who is opposed  feeds as ordered  complete course of ABX  decrease free water  decrease steroids  if BP remainsat current levels, decrease midodrine    care discussed with daughters

## 2017-08-19 NOTE — PROGRESS NOTE ADULT - ASSESSMENT
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     NEURO  #AMS   -Likely 2/2 sepsis superimposed on baseline AD. Family reports that at baseline patient is alert and oriented.  CT not performed because of similar episodes that were due to infection.  -c/w abx (meropenem) to treat sepsis and achieve source control, as noted below  -c/w Seroquel 150 q8h for agitation  #Seizure  -Pt does not appear to be having clinical seizures at this time, likely not the etiology of AMS  -c/w home Keppra  -c/w home Tripleptal    CV  #Severe Sepsis 2/2 UTI vs. PNA   -Pt hypotensive w/ elevated lactate and likely source of infection urinary tract vs. PNA (hx aspiration pneumonitis)  -blood cx's growing Klebsiella, sensitivities to follow  -Urine growing Klebsiella/E.Coli/Enterococcus, likely the source of bacteremia  -repeat blood cx's drawn 8/17 negative to date  -sputum gram stain- numerous epithelial cells, mod yeast-like cells, few WBCs, rare GNRs, need to repeat Sputum cx  -No currently on NE, started on midodrine overnight.   -c/w meropenem as likely source is UTI and do not need Vancomycin  #Hypotension  -Likely 2/2 severe sepsis, currently off Levo and pressures have been maintained  -continue to tx underlying infection  -c/w stress dose steroids 50mg hydrocortisone q12h    RESPIRATORY  #Hypercapnic Respiratory Failure s/p intubation  -Repeat VBG shows improvement of hypercapnia and oxygenation.    -continue on ventilator until mental status improves, currently off sedation but still w/ no response then trial of CPAP   -will need to evaluate ability to protect airway with questionable hx of tracheal deviation    GI  #Constipation/Diarrhea  -monitor for BM's     ID  #Klebsiella bacteremia  -Pt w/ hx of aspiration pneumonitis and ESBL UTI. CXR shows pleural effusions but no definitive infiltrate.  -f/u final sensitivities and de-escalate as appropriate  -c/w meropenem because of hx of ESBL  -do not need vanc anymore, as likely source is UTI     ENDO  #HHS (resolved)  -2/2 steroid use, blood glucose of 685 on admission.  -insulin gtt d/c'd as glucose downtrended  -c/w IVF  -transitioned to FSG/SSI + Lantus 10u at bedtime    FEN  #Fluids: none  #Monitor, Replete to K>4 and Mg>2  #Diet: Glucerna    PPX  -SQH 5000 u q8h  -Protonix    Code Status: FULL CODE

## 2017-08-19 NOTE — PROGRESS NOTE ADULT - SUBJECTIVE AND OBJECTIVE BOX
INTERVAL HPI/OVERNIGHT EVENTS: Started on midodrine overnight.     SUBJECTIVE: Patient seen and examined at bedside. Patient intubated, unable to obtain full ROS this morning. Patient's daughters at bedside.    OBJECTIVE:    VITAL SIGNS:  ICU Vital Signs Last 24 Hrs  T(C): 36.6 (19 Aug 2017 12:00), Max: 37.2 (18 Aug 2017 18:00)  T(F): 97.9 (19 Aug 2017 12:00), Max: 99 (18 Aug 2017 18:00)  HR: 48 (19 Aug 2017 16:00) (48 - 74)  BP: 118/54 (19 Aug 2017 16:00) (86/41 - 152/69)  BP(mean): 83 (19 Aug 2017 16:00) (62 - 114)  ABP: --  ABP(mean): --  RR: 12 (19 Aug 2017 16:00) (0 - 101)  SpO2: 99% (19 Aug 2017 16:00) (96% - 100%)    Mode: AC/ CMV (Assist Control/ Continuous Mandatory Ventilation), RR (machine): 10, TV (machine): 450, FiO2: 40, PEEP: 5, ITime: 1, MAP: 8.4, PIP: 21    08-18 @ 07:01 - 08-19 @ 07:00  --------------------------------------------------------  IN: 3061.5 mL / OUT: 1165 mL / NET: 1896.5 mL    08-19 @ 07:01 - 08-19 @ 16:25  --------------------------------------------------------  IN: 473.5 mL / OUT: 245 mL / NET: 228.5 mL      CAPILLARY BLOOD GLUCOSE  245 (19 Aug 2017 05:00)          PHYSICAL EXAM:    General: NAD, intubated, looks comfortable, non-responsive to verbal/tactile/painful stimuli  HEENT: pupils sluggishly reactive to light; dry MM  Respiratory: poor aeration, decreased BS throughout, no audible rhonchi or rales or wheezing on examination  Cardiovascular: +S1/S2; RRR; no M/R/G  Abdomen: PEG in place, soft; BS present  Extremities: extremities warm to touch; no peripheral edema  Neurological: AAOx0. Does not respond to verbal stimuli. Does not respond to tactile stimuli including pain in extremities and sternal rub. pupils sluggishly reactive. +corneal reflex.      MEDICATIONS:  MEDICATIONS  (STANDING):  atorvastatin 40 milliGRAM(s) Oral at bedtime  busPIRone 10 milliGRAM(s) Oral daily  haloperidol     Tablet 2 milliGRAM(s) Oral two times a day  gabapentin 300 milliGRAM(s) Oral two times a day  ALBUTerol/ipratropium for Nebulization 3 milliLiter(s) Nebulizer every 4 hours  pantoprazole  Injectable 40 milliGRAM(s) IV Push daily  chlorhexidine 0.12% Liquid 15 milliLiter(s) Swish and Spit two times a day  QUEtiapine 150 milliGRAM(s) Oral every 8 hours  levETIRAcetam  Solution 1500 milliGRAM(s) Oral two times a day  OXcarbazepine Suspension 300 milliGRAM(s) Oral daily  OXcarbazepine Suspension 450 milliGRAM(s) Oral at bedtime  heparin  Injectable 5000 Unit(s) SubCutaneous every 8 hours  meropenem IVPB 1000 milliGRAM(s) IV Intermittent every 12 hours  insulin lispro (HumaLOG) corrective regimen sliding scale   SubCutaneous every 6 hours  dextrose 5%. 1000 milliLiter(s) (50 mL/Hr) IV Continuous <Continuous>  dextrose 50% Injectable 12.5 Gram(s) IV Push once  dextrose 50% Injectable 25 Gram(s) IV Push once  dextrose 50% Injectable 25 Gram(s) IV Push once  insulin glargine Injectable (LANTUS) 10 Unit(s) SubCutaneous at bedtime  dexmedetomidine Infusion 0.2 MICROgram(s)/kG/Hr (3.515 mL/Hr) IV Continuous <Continuous>  midodrine 10 milliGRAM(s) Oral three times a day  hydrocortisone sodium succinate Injectable 50 milliGRAM(s) IV Push every 12 hours    MEDICATIONS  (PRN):  dextrose Gel 1 Dose(s) Oral once PRN Blood Glucose LESS THAN 70 milliGRAM(s)/deciliter  glucagon  Injectable 1 milliGRAM(s) IntraMuscular once PRN Glucose LESS THAN 70 milligrams/deciliter      ALLERGIES:  Allergies    No Known Allergies    Intolerances        LABS:                        8.6    5.1   )-----------( 125      ( 19 Aug 2017 05:16 )             28.1     08-19    148<H>  |  105  |  36<H>  ----------------------------<  255<H>  3.6   |  33<H>  |  0.70    Ca    8.4      19 Aug 2017 05:19  Phos  2.9     08-19  Mg     2.6     08-19            RADIOLOGY & ADDITIONAL TESTS: Reviewed.

## 2017-08-19 NOTE — PROGRESS NOTE ADULT - SUBJECTIVE AND OBJECTIVE BOX
Patient is a 89y old  Male who presents with a chief complaint of shortness of breath (16 Aug 2017 23:08)        INTERVAL HPI/OVERNIGHT EVENTS: none    SYMPTOMS no communicative    DRIPS    Mode: AC/ CMV (Assist Control/ Continuous Mandatory Ventilation)  RR (machine): 10  TV (machine): 450  FiO2: 40  PEEP: 5  ITime: 1  MAP: 8  PIP: 18      ICU Vital Signs Last 24 Hrs  T(C): 36.6 (19 Aug 2017 12:00), Max: 37.2 (18 Aug 2017 18:00)  T(F): 97.9 (19 Aug 2017 12:00), Max: 99 (18 Aug 2017 18:00)  HR: 54 (19 Aug 2017 13:00) (50 - 74)  BP: 123/58 (19 Aug 2017 13:00) (86/41 - 152/69)  BP(mean): 72 (19 Aug 2017 13:00) (62 - 114)  ABP: --  ABP(mean): --  RR: 11 (19 Aug 2017 13:00) (0 - 101)  SpO2: 99% (19 Aug 2017 13:10) (96% - 981%)      I&O's Summary    18 Aug 2017 07:01  -  19 Aug 2017 07:00  --------------------------------------------------------  IN: 3061.5 mL / OUT: 1165 mL / NET: 1896.5 mL    19 Aug 2017 07:01  -  19 Aug 2017 14:12  --------------------------------------------------------  IN: 473.5 mL / OUT: 245 mL / NET: 228.5 mL        EXAM    Chest few ronchi    Heart rr    Abdomen distendedsoft with bs    Extremities warm, trace edema    Neuro noncommunicative      LABS:  CAPILLARY BLOOD GLUCOSE  245 (19 Aug 2017 05:00)  188 (18 Aug 2017 23:32)                            8.6    5.1   )-----------( 125      ( 19 Aug 2017 05:16 )             28.1     08-19    148<H>  |  105  |  36<H>  ----------------------------<  255<H>  3.6   |  33<H>  |  0.70    Ca    8.4      19 Aug 2017 05:19  Phos  2.9     08-19  Mg     2.6     08-19                RADIOLOGY & ADDITIONAL STUDIES: no change  culture no new data    CRITICAL CARE TIME SPENT: 40

## 2017-08-19 NOTE — PROVIDER CONTACT NOTE (CRITICAL VALUE NOTIFICATION) - TEST AND RESULT REPORTED:
Blood cultures: Gram negative rods
Urine Culture 08/16/2017
glucose 658
potassium level 9.9,calcium 5.4

## 2017-08-19 NOTE — PROVIDER CONTACT NOTE (CRITICAL VALUE NOTIFICATION) - SITUATION
Aspiration PNA
Urine Culture from 08/16/2017 is positive for Klebsiella >60,000 ESBL and E-Coli ESBL
potassium level reported as p.9 and calcium 5.4

## 2017-08-20 LAB
-  AMIKACIN: SIGNIFICANT CHANGE UP
-  AMPICILLIN/SULBACTAM: SIGNIFICANT CHANGE UP
-  AMPICILLIN: SIGNIFICANT CHANGE UP
-  AMPICILLIN: SIGNIFICANT CHANGE UP
-  CEFAZOLIN: SIGNIFICANT CHANGE UP
-  CEFTRIAXONE: SIGNIFICANT CHANGE UP
-  CIPROFLOXACIN: SIGNIFICANT CHANGE UP
-  CIPROFLOXACIN: SIGNIFICANT CHANGE UP
-  GENTAMICIN: SIGNIFICANT CHANGE UP
-  IMIPENEM: SIGNIFICANT CHANGE UP
-  NITROFURANTOIN: SIGNIFICANT CHANGE UP
-  PIPERACILLIN/TAZOBACTAM: SIGNIFICANT CHANGE UP
-  TETRACYCLINE: SIGNIFICANT CHANGE UP
-  TOBRAMYCIN: SIGNIFICANT CHANGE UP
-  TRIMETHOPRIM/SULFAMETHOXAZOLE: SIGNIFICANT CHANGE UP
ANION GAP SERPL CALC-SCNC: 11 MMOL/L — SIGNIFICANT CHANGE UP (ref 5–17)
BUN SERPL-MCNC: 31 MG/DL — HIGH (ref 7–23)
CALCIUM SERPL-MCNC: 8.8 MG/DL — SIGNIFICANT CHANGE UP (ref 8.4–10.5)
CHLORIDE SERPL-SCNC: 104 MMOL/L — SIGNIFICANT CHANGE UP (ref 96–108)
CO2 SERPL-SCNC: 32 MMOL/L — HIGH (ref 22–31)
CREAT SERPL-MCNC: 0.7 MG/DL — SIGNIFICANT CHANGE UP (ref 0.5–1.3)
CULTURE RESULTS: SIGNIFICANT CHANGE UP
CULTURE RESULTS: SIGNIFICANT CHANGE UP
GLUCOSE SERPL-MCNC: 154 MG/DL — HIGH (ref 70–99)
HCT VFR BLD CALC: 29 % — LOW (ref 39–50)
HGB BLD-MCNC: 9.1 G/DL — LOW (ref 13–17)
MAGNESIUM SERPL-MCNC: 2.6 MG/DL — SIGNIFICANT CHANGE UP (ref 1.6–2.6)
MCHC RBC-ENTMCNC: 30 PG — SIGNIFICANT CHANGE UP (ref 27–34)
MCHC RBC-ENTMCNC: 31.4 G/DL — LOW (ref 32–36)
MCV RBC AUTO: 95.7 FL — SIGNIFICANT CHANGE UP (ref 80–100)
METHOD TYPE: SIGNIFICANT CHANGE UP
METHOD TYPE: SIGNIFICANT CHANGE UP
ORGANISM # SPEC MICROSCOPIC CNT: SIGNIFICANT CHANGE UP
PHOSPHATE SERPL-MCNC: 2.8 MG/DL — SIGNIFICANT CHANGE UP (ref 2.5–4.5)
PLATELET # BLD AUTO: 137 K/UL — LOW (ref 150–400)
POTASSIUM SERPL-MCNC: 3 MMOL/L — LOW (ref 3.5–5.3)
POTASSIUM SERPL-SCNC: 3 MMOL/L — LOW (ref 3.5–5.3)
RBC # BLD: 3.03 M/UL — LOW (ref 4.2–5.8)
RBC # FLD: 13.5 % — SIGNIFICANT CHANGE UP (ref 10.3–16.9)
SODIUM SERPL-SCNC: 147 MMOL/L — HIGH (ref 135–145)
SPECIMEN SOURCE: SIGNIFICANT CHANGE UP
SPECIMEN SOURCE: SIGNIFICANT CHANGE UP
WBC # BLD: 5.9 K/UL — SIGNIFICANT CHANGE UP (ref 3.8–10.5)
WBC # FLD AUTO: 5.9 K/UL — SIGNIFICANT CHANGE UP (ref 3.8–10.5)

## 2017-08-20 PROCEDURE — 71010: CPT | Mod: 26

## 2017-08-20 PROCEDURE — 99291 CRITICAL CARE FIRST HOUR: CPT

## 2017-08-20 PROCEDURE — 93010 ELECTROCARDIOGRAM REPORT: CPT

## 2017-08-20 RX ORDER — POTASSIUM CHLORIDE 20 MEQ
40 PACKET (EA) ORAL ONCE
Qty: 0 | Refills: 0 | Status: COMPLETED | OUTPATIENT
Start: 2017-08-20 | End: 2017-08-20

## 2017-08-20 RX ORDER — POTASSIUM CHLORIDE 20 MEQ
20 PACKET (EA) ORAL ONCE
Qty: 0 | Refills: 0 | Status: COMPLETED | OUTPATIENT
Start: 2017-08-20 | End: 2017-08-20

## 2017-08-20 RX ORDER — HYDROCORTISONE 20 MG
25 TABLET ORAL EVERY 12 HOURS
Qty: 0 | Refills: 0 | Status: DISCONTINUED | OUTPATIENT
Start: 2017-08-20 | End: 2017-08-22

## 2017-08-20 RX ADMIN — CHLORHEXIDINE GLUCONATE 15 MILLILITER(S): 213 SOLUTION TOPICAL at 05:20

## 2017-08-20 RX ADMIN — Medication 20 MILLIEQUIVALENT(S): at 07:35

## 2017-08-20 RX ADMIN — Medication 2: at 23:25

## 2017-08-20 RX ADMIN — LEVETIRACETAM 1500 MILLIGRAM(S): 250 TABLET, FILM COATED ORAL at 05:21

## 2017-08-20 RX ADMIN — HEPARIN SODIUM 5000 UNIT(S): 5000 INJECTION INTRAVENOUS; SUBCUTANEOUS at 23:28

## 2017-08-20 RX ADMIN — MEROPENEM 100 MILLIGRAM(S): 1 INJECTION INTRAVENOUS at 09:42

## 2017-08-20 RX ADMIN — Medication 25 MILLIGRAM(S): at 18:03

## 2017-08-20 RX ADMIN — GABAPENTIN 300 MILLIGRAM(S): 400 CAPSULE ORAL at 18:03

## 2017-08-20 RX ADMIN — ATORVASTATIN CALCIUM 40 MILLIGRAM(S): 80 TABLET, FILM COATED ORAL at 23:29

## 2017-08-20 RX ADMIN — Medication 3 MILLILITER(S): at 01:44

## 2017-08-20 RX ADMIN — Medication 2: at 06:43

## 2017-08-20 RX ADMIN — QUETIAPINE FUMARATE 150 MILLIGRAM(S): 200 TABLET, FILM COATED ORAL at 23:29

## 2017-08-20 RX ADMIN — MEROPENEM 100 MILLIGRAM(S): 1 INJECTION INTRAVENOUS at 23:28

## 2017-08-20 RX ADMIN — Medication 3 MILLILITER(S): at 14:32

## 2017-08-20 RX ADMIN — Medication 2: at 12:22

## 2017-08-20 RX ADMIN — MIDODRINE HYDROCHLORIDE 5 MILLIGRAM(S): 2.5 TABLET ORAL at 14:58

## 2017-08-20 RX ADMIN — PANTOPRAZOLE SODIUM 40 MILLIGRAM(S): 20 TABLET, DELAYED RELEASE ORAL at 12:20

## 2017-08-20 RX ADMIN — Medication 10 MILLIGRAM(S): at 12:21

## 2017-08-20 RX ADMIN — MIDODRINE HYDROCHLORIDE 5 MILLIGRAM(S): 2.5 TABLET ORAL at 23:29

## 2017-08-20 RX ADMIN — MIDODRINE HYDROCHLORIDE 5 MILLIGRAM(S): 2.5 TABLET ORAL at 05:23

## 2017-08-20 RX ADMIN — INSULIN GLARGINE 14 UNIT(S): 100 INJECTION, SOLUTION SUBCUTANEOUS at 23:26

## 2017-08-20 RX ADMIN — GABAPENTIN 300 MILLIGRAM(S): 400 CAPSULE ORAL at 05:19

## 2017-08-20 RX ADMIN — OXCARBAZEPINE 450 MILLIGRAM(S): 300 TABLET, FILM COATED ORAL at 23:29

## 2017-08-20 RX ADMIN — HALOPERIDOL DECANOATE 2 MILLIGRAM(S): 100 INJECTION INTRAMUSCULAR at 05:19

## 2017-08-20 RX ADMIN — QUETIAPINE FUMARATE 150 MILLIGRAM(S): 200 TABLET, FILM COATED ORAL at 05:20

## 2017-08-20 RX ADMIN — Medication 3 MILLILITER(S): at 06:00

## 2017-08-20 RX ADMIN — Medication 40 MILLIEQUIVALENT(S): at 12:20

## 2017-08-20 RX ADMIN — LEVETIRACETAM 1500 MILLIGRAM(S): 250 TABLET, FILM COATED ORAL at 18:24

## 2017-08-20 RX ADMIN — HEPARIN SODIUM 5000 UNIT(S): 5000 INJECTION INTRAVENOUS; SUBCUTANEOUS at 14:58

## 2017-08-20 RX ADMIN — HEPARIN SODIUM 5000 UNIT(S): 5000 INJECTION INTRAVENOUS; SUBCUTANEOUS at 05:26

## 2017-08-20 RX ADMIN — CHLORHEXIDINE GLUCONATE 15 MILLILITER(S): 213 SOLUTION TOPICAL at 18:02

## 2017-08-20 RX ADMIN — HALOPERIDOL DECANOATE 2 MILLIGRAM(S): 100 INJECTION INTRAMUSCULAR at 18:04

## 2017-08-20 RX ADMIN — Medication 3 MILLILITER(S): at 22:09

## 2017-08-20 RX ADMIN — Medication 3 MILLILITER(S): at 17:49

## 2017-08-20 RX ADMIN — OXCARBAZEPINE 300 MILLIGRAM(S): 300 TABLET, FILM COATED ORAL at 12:20

## 2017-08-20 RX ADMIN — Medication 3 MILLILITER(S): at 10:15

## 2017-08-20 RX ADMIN — Medication 50 MILLIGRAM(S): at 05:20

## 2017-08-20 RX ADMIN — Medication 40 MILLIEQUIVALENT(S): at 06:43

## 2017-08-20 NOTE — PROGRESS NOTE ADULT - ASSESSMENT
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     NEURO  #AMS   -Likely 2/2 sepsis superimposed on baseline AD. Family reports that at baseline patient is alert and oriented.  CT not performed because of similar episodes that were due to infection.  -c/w abx (meropenem) to treat sepsis and achieve source control, as noted below  -c/w Seroquel 150 q8h for agitation  #Seizure  -Pt does not appear to be having clinical seizures at this time, likely not the etiology of AMS  -c/w home Keppra  -c/w home Tripleptal    CV  #Severe Sepsis 2/2 UTI vs. PNA   -Pt hypotensive w/ elevated lactate and likely source of infection urinary tract vs. PNA (hx aspiration pneumonitis)  -blood cx's growing Klebsiella, sensitivities to follow  -Urine growing Klebsiella/E.Coli/Enterococcus, likely the source of bacteremia  -repeat blood cx's drawn 8/17 negative to date  -sputum gram stain- numerous epithelial cells, mod yeast-like cells, few WBCs, rare GNRs, need to repeat Sputum cx  -No currently on NE, started on midodrine overnight.   -c/w meropenem as likely source is UTI and do not need Vancomycin  #Hypotension  -Likely 2/2 severe sepsis, currently off Levo and pressures have been maintained  -continue to tx underlying infection  - reduce steroids to dose 25mg hydrocortisone q8h  - Fluid overloaded 2/2 IVF - give lasix 10mg BID    RESPIRATORY  #Hypercapnic Respiratory Failure s/p intubation  -Repeat VBG shows improvement of hypercapnia and oxygenation.    -continue on ventilator until mental status improves, currently off sedation but still w/ no response then trial of CPAP   -will need to evaluate ability to protect airway with questionable hx of tracheal deviation    GI  #Constipation/Diarrhea  -monitor for BM's     ID  #Klebsiella bacteremia  -Pt w/ hx of aspiration pneumonitis and ESBL UTI. CXR shows pleural effusions but no definitive infiltrate.  -f/u final sensitivities and de-escalate as appropriate  -c/w meropenem because of hx of ESBL  -do not need vanc anymore, as likely source is UTI     ENDO  #HHS (resolved)  -2/2 steroid use, blood glucose of 685 on admission.  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     NEURO  #AMS   -Likely 2/2 sepsis superimposed on baseline AD. Family reports that at baseline patient is alert and oriented.  CT not performed because of similar episodes that were due to infection.  -c/w abx (meropenem) to treat sepsis and achieve source control, as noted below  -c/w Seroquel 150 q8h for agitation  #Seizure  -Pt does not appear to be having clinical seizures at this time, likely not the etiology of AMS  -c/w home Keppra  -c/w home Tripleptal    CV  #Severe Sepsis 2/2 UTI vs. PNA   -Pt hypotensive w/ elevated lactate and likely source of infection urinary tract vs. PNA (hx aspiration pneumonitis)  -blood cx's growing Klebsiella, sensitivities to follow  -Urine growing Klebsiella/E.Coli/Enterococcus, likely the source of bacteremia  -repeat blood cx's drawn 8/17 negative to date  -sputum gram stain- numerous epithelial cells, mod yeast-like cells, few WBCs, rare GNRs, need to repeat Sputum cx  -No currently on NE, started on midodrine overnight.   -c/w meropenem as likely source is UTI and do not need Vancomycin  #Hypotension  -Likely 2/2 severe sepsis, currently off Levo and pressures have been maintained  -continue to tx underlying infection  - reduce steroids to dose 25mg hydrocortisone q8h  - Fluid overloaded 2/2 IVF - give metolazone 10mg BID    RESPIRATORY  #Hypercapnic Respiratory Failure s/p intubation  -Repeat VBG shows improvement of hypercapnia and oxygenation.    -continue on ventilator until mental status improves, currently off sedation but still w/ no response then trial of CPAP   -will need to evaluate ability to protect airway with questionable hx of tracheal deviation    GI  #Constipation/Diarrhea  -monitor for BM's     ID  #Klebsiella bacteremia  -Pt w/ hx of aspiration pneumonitis and ESBL UTI. CXR shows pleural effusions but no definitive infiltrate.  -f/u final sensitivities and de-escalate as appropriate  -c/w meropenem because of hx of ESBL  -do not need vanc anymore, as likely source is UTI     ENDO  #HHS (resolved)  -2/2 steroid use, blood glucose of 685 on admission.  -insulin gtt d/c'd as glucose downtrended  -c/w IVF  -transitioned to FSG/SSI + Lantus 10u at bedtime    FEN  #Fluids: none  #Monitor, Replete to K>4 and Mg>2  #Diet: Glucerna    PPX  -SQH 5000 u q8h  -Protonix    Code Status: FULL CODE

## 2017-08-20 NOTE — PROGRESS NOTE ADULT - ASSESSMENT
A -respiratory failure/urosepsis/dementia/hyernatremia    Suggest:    continue ABX  diuresis  free water  no change in feeds  try tpiece    Long discussion with daughter regarding extubation.  Not currently a candidate, RR to 30 on CPAP yesterday.  To try tpiece tody.  Discussed a trach, which they do not want.

## 2017-08-20 NOTE — PROGRESS NOTE ADULT - SUBJECTIVE AND OBJECTIVE BOX
Patient is a 89y old  Male who presents with a chief complaint of shortness of breath (16 Aug 2017 23:08)        INTERVAL HPI/OVERNIGHT EVENTS: none    SYMPTOMS noninteractive    DRIPS none    Mode: AC/ CMV (Assist Control/ Continuous Mandatory Ventilation)  RR (machine): 10  TV (machine): 450  FiO2: 40  PEEP: 5  ITime: 1  MAP: 7.9  PIP: 21      ICU Vital Signs Last 24 Hrs  T(C): 37.3 (20 Aug 2017 10:00), Max: 37.4 (20 Aug 2017 09:58)  T(F): 99.2 (20 Aug 2017 10:00), Max: 99.3 (20 Aug 2017 09:58)  HR: 60 (20 Aug 2017 11:00) (48 - 82)  BP: 84/46 (20 Aug 2017 11:00) (84/46 - 125/63)  BP(mean): 64 (20 Aug 2017 11:00) (62 - 96)  ABP: --  ABP(mean): --  RR: 13 (20 Aug 2017 11:00) (9 - 22)  SpO2: 98% (20 Aug 2017 11:00) (96% - 100%)      I&O's Summary    19 Aug 2017 07:01  -  20 Aug 2017 07:00  --------------------------------------------------------  IN: 1263.5 mL / OUT: 1390 mL / NET: -126.5 mL    20 Aug 2017 07:01  -  20 Aug 2017 11:43  --------------------------------------------------------  IN: 290 mL / OUT: 135 mL / NET: 155 mL        EXAM    Chest ronchi    Cor -rr    Heart - rr    Abdomen soft with bs    Extremities edema    Neuro noninteractive      LABS:  CAPILLARY BLOOD GLUCOSE  163 (20 Aug 2017 07:00)  153 (19 Aug 2017 17:00)                            9.1    5.9   )-----------( 137      ( 20 Aug 2017 04:02 )             29.0     08-20    147<H>  |  104  |  31<H>  ----------------------------<  154<H>  3.0<L>   |  32<H>  |  0.70    Ca    8.8      20 Aug 2017 04:30  Phos  2.8     08-20  Mg     2.6     08-20                RADIOLOGY & ADDITIONAL STUDIES:    CRITICAL CARE TIME SPENT:

## 2017-08-20 NOTE — PROGRESS NOTE ADULT - SUBJECTIVE AND OBJECTIVE BOX
INTERVAL HPI/OVERNIGHT EVENTS:RAJEEV    SUBJECTIVE: Patient seen and examined at bedside. Pt. intubated.     OBJECTIVE:    VITAL SIGNS:  ICU Vital Signs Last 24 Hrs  T(C): 37.3 (20 Aug 2017 10:00), Max: 37.4 (20 Aug 2017 09:58)  T(F): 99.2 (20 Aug 2017 10:00), Max: 99.3 (20 Aug 2017 09:58)  HR: 60 (20 Aug 2017 13:00) (48 - 82)  BP: 105/54 (20 Aug 2017 13:00) (84/46 - 125/63)  BP(mean): 77 (20 Aug 2017 13:00) (61 - 96)  ABP: --  ABP(mean): --  RR: 15 (20 Aug 2017 13:00) (9 - 22)  SpO2: 98% (20 Aug 2017 13:00) (96% - 100%)    Mode: AC/ CMV (Assist Control/ Continuous Mandatory Ventilation), RR (machine): 10, TV (machine): 450, FiO2: 40, PEEP: 5, ITime: 1, MAP: 8.4, PIP: 18    08-19 @ 07:01  -  08-20 @ 07:00  --------------------------------------------------------  IN: 1263.5 mL / OUT: 1390 mL / NET: -126.5 mL    08-20 @ 07:01 - 08-20 @ 14:17  --------------------------------------------------------  IN: 510 mL / OUT: 360 mL / NET: 150 mL      CAPILLARY BLOOD GLUCOSE  163 (20 Aug 2017 12:00)          PHYSICAL EXAM:    Constitutional: NAD  HEENT: NC/AT; PERRL, anicteric sclera; MMM  Neck: supple, no JVD  Cardiovascular: +S1/S2, RRR  Respiratory: CTA B/L, no W/R/R  Gastrointestinal: abdomen soft, NT/ND; no rebound or guarding; +BSx4  Genitourinary: no suprapubic tenderness or fullness  Extremities: WWP; no LE edema; no clubbing or cyanosis  Vascular: 2+ radial, DP/PT and femoral pulses B/L  Dermatologic: normal color and turgor; no visible rashes  Neurological:     MEDICATIONS:  MEDICATIONS  (STANDING):  atorvastatin 40 milliGRAM(s) Oral at bedtime  busPIRone 10 milliGRAM(s) Oral daily  haloperidol     Tablet 2 milliGRAM(s) Oral two times a day  gabapentin 300 milliGRAM(s) Oral two times a day  ALBUTerol/ipratropium for Nebulization 3 milliLiter(s) Nebulizer every 4 hours  pantoprazole  Injectable 40 milliGRAM(s) IV Push daily  chlorhexidine 0.12% Liquid 15 milliLiter(s) Swish and Spit two times a day  QUEtiapine 150 milliGRAM(s) Oral every 8 hours  levETIRAcetam  Solution 1500 milliGRAM(s) Oral two times a day  OXcarbazepine Suspension 300 milliGRAM(s) Oral daily  OXcarbazepine Suspension 450 milliGRAM(s) Oral at bedtime  heparin  Injectable 5000 Unit(s) SubCutaneous every 8 hours  meropenem IVPB 1000 milliGRAM(s) IV Intermittent every 12 hours  insulin lispro (HumaLOG) corrective regimen sliding scale   SubCutaneous every 6 hours  dextrose 5%. 1000 milliLiter(s) (50 mL/Hr) IV Continuous <Continuous>  dextrose 50% Injectable 12.5 Gram(s) IV Push once  dextrose 50% Injectable 25 Gram(s) IV Push once  dextrose 50% Injectable 25 Gram(s) IV Push once  dexmedetomidine Infusion 0.2 MICROgram(s)/kG/Hr (3.515 mL/Hr) IV Continuous <Continuous>  midodrine 5 milliGRAM(s) Oral every 8 hours  insulin glargine Injectable (LANTUS) 14 Unit(s) SubCutaneous at bedtime  metolazone 10 milliGRAM(s) Oral two times a day  hydrocortisone sodium succinate Injectable 25 milliGRAM(s) IV Push every 12 hours    MEDICATIONS  (PRN):  dextrose Gel 1 Dose(s) Oral once PRN Blood Glucose LESS THAN 70 milliGRAM(s)/deciliter  glucagon  Injectable 1 milliGRAM(s) IntraMuscular once PRN Glucose LESS THAN 70 milligrams/deciliter      ALLERGIES:  Allergies    No Known Allergies    Intolerances        LABS:                        9.1    5.9   )-----------( 137      ( 20 Aug 2017 04:02 )             29.0     08-20    147<H>  |  104  |  31<H>  ----------------------------<  154<H>  3.0<L>   |  32<H>  |  0.70    Ca    8.8      20 Aug 2017 04:30  Phos  2.8     08-20  Mg     2.6     08-20            RADIOLOGY & ADDITIONAL TESTS: Reviewed.    ASSESSMENT:    PLAN:    PULMONARY    NEURO    CARDIOVASCULAR    RENAL    ID    GI/FEN    DVT PPx -     LINES -     CODE -     DISPO - continue intensive level of care in CCM/MICU service INTERVAL HPI/OVERNIGHT EVENTS:RAJEEV    SUBJECTIVE: Patient seen and examined at bedside. Pt. intubated and fatigued. Not alert or responding appropriately. Artur is at bedside and notes he's been sleeping comfortably.     OBJECTIVE:    VITAL SIGNS:  ICU Vital Signs Last 24 Hrs  T(C): 37.3 (20 Aug 2017 10:00), Max: 37.4 (20 Aug 2017 09:58)  T(F): 99.2 (20 Aug 2017 10:00), Max: 99.3 (20 Aug 2017 09:58)  HR: 60 (20 Aug 2017 13:00) (48 - 82)  BP: 105/54 (20 Aug 2017 13:00) (84/46 - 125/63)  BP(mean): 77 (20 Aug 2017 13:00) (61 - 96)  ABP: --  ABP(mean): --  RR: 15 (20 Aug 2017 13:00) (9 - 22)  SpO2: 98% (20 Aug 2017 13:00) (96% - 100%)    Mode: AC/ CMV (Assist Control/ Continuous Mandatory Ventilation), RR (machine): 10, TV (machine): 450, FiO2: 40, PEEP: 5, ITime: 1, MAP: 8.4, PIP: 18    08-19 @ 07:01 - 08-20 @ 07:00  --------------------------------------------------------  IN: 1263.5 mL / OUT: 1390 mL / NET: -126.5 mL    08-20 @ 07:01 - 08-20 @ 14:17  --------------------------------------------------------  IN: 510 mL / OUT: 360 mL / NET: 150 mL      CAPILLARY BLOOD GLUCOSE  163 (20 Aug 2017 12:00)          PHYSICAL EXAM:    Constitutional: NAD, intubated, elderly male  HEENT: NC/AT; PERRL, anicteric sclera; MMM  Neck: supple, no JVD  Cardiovascular: +S1/S2, RRR  Respiratory: decreased breath sounds throughout lungs, no W/R/R  Gastrointestinal: abdomen soft, NT/ND; no rebound or guarding; +BSx4  Genitourinary: no suprapubic tenderness or fullness  Extremities: WWP; 1+ bilateral edema; no clubbing or cyanosis  Vascular: 2+ radial, DP/PT and femoral pulses B/L  Dermatologic: normal color and turgor; no visible rashes  Neurological: A&Ox0, does not respond to verbal stimuli. Does not respond to tactile stimuli including pain in extremities and sternal rub    MEDICATIONS:  MEDICATIONS  (STANDING):  atorvastatin 40 milliGRAM(s) Oral at bedtime  busPIRone 10 milliGRAM(s) Oral daily  haloperidol     Tablet 2 milliGRAM(s) Oral two times a day  gabapentin 300 milliGRAM(s) Oral two times a day  ALBUTerol/ipratropium for Nebulization 3 milliLiter(s) Nebulizer every 4 hours  pantoprazole  Injectable 40 milliGRAM(s) IV Push daily  chlorhexidine 0.12% Liquid 15 milliLiter(s) Swish and Spit two times a day  QUEtiapine 150 milliGRAM(s) Oral every 8 hours  levETIRAcetam  Solution 1500 milliGRAM(s) Oral two times a day  OXcarbazepine Suspension 300 milliGRAM(s) Oral daily  OXcarbazepine Suspension 450 milliGRAM(s) Oral at bedtime  heparin  Injectable 5000 Unit(s) SubCutaneous every 8 hours  meropenem IVPB 1000 milliGRAM(s) IV Intermittent every 12 hours  insulin lispro (HumaLOG) corrective regimen sliding scale   SubCutaneous every 6 hours  dextrose 5%. 1000 milliLiter(s) (50 mL/Hr) IV Continuous <Continuous>  dextrose 50% Injectable 12.5 Gram(s) IV Push once  dextrose 50% Injectable 25 Gram(s) IV Push once  dextrose 50% Injectable 25 Gram(s) IV Push once  dexmedetomidine Infusion 0.2 MICROgram(s)/kG/Hr (3.515 mL/Hr) IV Continuous <Continuous>  midodrine 5 milliGRAM(s) Oral every 8 hours  insulin glargine Injectable (LANTUS) 14 Unit(s) SubCutaneous at bedtime  metolazone 10 milliGRAM(s) Oral two times a day  hydrocortisone sodium succinate Injectable 25 milliGRAM(s) IV Push every 12 hours    MEDICATIONS  (PRN):  dextrose Gel 1 Dose(s) Oral once PRN Blood Glucose LESS THAN 70 milliGRAM(s)/deciliter  glucagon  Injectable 1 milliGRAM(s) IntraMuscular once PRN Glucose LESS THAN 70 milligrams/deciliter      ALLERGIES:  Allergies    No Known Allergies    Intolerances        LABS:                        9.1    5.9   )-----------( 137      ( 20 Aug 2017 04:02 )             29.0     08-20    147<H>  |  104  |  31<H>  ----------------------------<  154<H>  3.0<L>   |  32<H>  |  0.70    Ca    8.8      20 Aug 2017 04:30  Phos  2.8     08-20  Mg     2.6     08-20            RADIOLOGY & ADDITIONAL TESTS: Reviewed.    ASSESSMENT:    PLAN:    PULMONARY    NEURO    CARDIOVASCULAR    RENAL    ID    GI/FEN    DVT PPx -     LINES -     CODE -     DISPO - continue intensive level of care in CCM/MICU service

## 2017-08-21 LAB
ALBUMIN SERPL ELPH-MCNC: 2.6 G/DL — LOW (ref 3.3–5)
ALP SERPL-CCNC: 109 U/L — SIGNIFICANT CHANGE UP (ref 40–120)
ALT FLD-CCNC: 38 U/L — SIGNIFICANT CHANGE UP (ref 10–45)
ANION GAP SERPL CALC-SCNC: 6 MMOL/L — SIGNIFICANT CHANGE UP (ref 5–17)
AST SERPL-CCNC: 25 U/L — SIGNIFICANT CHANGE UP (ref 10–40)
BILIRUB SERPL-MCNC: 0.2 MG/DL — SIGNIFICANT CHANGE UP (ref 0.2–1.2)
BLD GP AB SCN SERPL QL: NEGATIVE — SIGNIFICANT CHANGE UP
BUN SERPL-MCNC: 25 MG/DL — HIGH (ref 7–23)
CALCIUM SERPL-MCNC: 8.8 MG/DL — SIGNIFICANT CHANGE UP (ref 8.4–10.5)
CHLORIDE SERPL-SCNC: 106 MMOL/L — SIGNIFICANT CHANGE UP (ref 96–108)
CO2 SERPL-SCNC: 34 MMOL/L — HIGH (ref 22–31)
CREAT SERPL-MCNC: 0.6 MG/DL — SIGNIFICANT CHANGE UP (ref 0.5–1.3)
EOSINOPHIL NFR BLD AUTO: 1.3 % — SIGNIFICANT CHANGE UP (ref 0–6)
GLUCOSE SERPL-MCNC: 131 MG/DL — HIGH (ref 70–99)
HCT VFR BLD CALC: 26.3 % — LOW (ref 39–50)
HGB BLD-MCNC: 8.1 G/DL — LOW (ref 13–17)
LYMPHOCYTES # BLD AUTO: 16.6 % — SIGNIFICANT CHANGE UP (ref 13–44)
MAGNESIUM SERPL-MCNC: 2.4 MG/DL — SIGNIFICANT CHANGE UP (ref 1.6–2.6)
MCHC RBC-ENTMCNC: 29.7 PG — SIGNIFICANT CHANGE UP (ref 27–34)
MCHC RBC-ENTMCNC: 30.8 G/DL — LOW (ref 32–36)
MCV RBC AUTO: 96.3 FL — SIGNIFICANT CHANGE UP (ref 80–100)
MONOCYTES NFR BLD AUTO: 3.9 % — SIGNIFICANT CHANGE UP (ref 2–14)
NEUTROPHILS NFR BLD AUTO: 78.2 % — HIGH (ref 43–77)
PHOSPHATE SERPL-MCNC: 3.3 MG/DL — SIGNIFICANT CHANGE UP (ref 2.5–4.5)
PLATELET # BLD AUTO: 143 K/UL — LOW (ref 150–400)
POTASSIUM SERPL-MCNC: 3.6 MMOL/L — SIGNIFICANT CHANGE UP (ref 3.5–5.3)
POTASSIUM SERPL-SCNC: 3.6 MMOL/L — SIGNIFICANT CHANGE UP (ref 3.5–5.3)
PROT SERPL-MCNC: 5.8 G/DL — LOW (ref 6–8.3)
RBC # BLD: 2.73 M/UL — LOW (ref 4.2–5.8)
RBC # FLD: 13.5 % — SIGNIFICANT CHANGE UP (ref 10.3–16.9)
RH IG SCN BLD-IMP: POSITIVE — SIGNIFICANT CHANGE UP
SODIUM SERPL-SCNC: 146 MMOL/L — HIGH (ref 135–145)
WBC # BLD: 6.4 K/UL — SIGNIFICANT CHANGE UP (ref 3.8–10.5)
WBC # FLD AUTO: 6.4 K/UL — SIGNIFICANT CHANGE UP (ref 3.8–10.5)

## 2017-08-21 PROCEDURE — 71010: CPT | Mod: 26

## 2017-08-21 PROCEDURE — 99233 SBSQ HOSP IP/OBS HIGH 50: CPT | Mod: GC

## 2017-08-21 RX ORDER — QUETIAPINE FUMARATE 200 MG/1
100 TABLET, FILM COATED ORAL EVERY 8 HOURS
Qty: 0 | Refills: 0 | Status: DISCONTINUED | OUTPATIENT
Start: 2017-08-21 | End: 2017-08-21

## 2017-08-21 RX ORDER — POTASSIUM CHLORIDE 20 MEQ
20 PACKET (EA) ORAL
Qty: 0 | Refills: 0 | Status: COMPLETED | OUTPATIENT
Start: 2017-08-21 | End: 2017-08-21

## 2017-08-21 RX ORDER — HALOPERIDOL DECANOATE 100 MG/ML
1 INJECTION INTRAMUSCULAR
Qty: 0 | Refills: 0 | Status: DISCONTINUED | OUTPATIENT
Start: 2017-08-21 | End: 2017-08-23

## 2017-08-21 RX ORDER — QUETIAPINE FUMARATE 200 MG/1
75 TABLET, FILM COATED ORAL EVERY 8 HOURS
Qty: 0 | Refills: 0 | Status: DISCONTINUED | OUTPATIENT
Start: 2017-08-21 | End: 2017-08-24

## 2017-08-21 RX ADMIN — Medication 3 MILLILITER(S): at 09:01

## 2017-08-21 RX ADMIN — OXCARBAZEPINE 450 MILLIGRAM(S): 300 TABLET, FILM COATED ORAL at 22:03

## 2017-08-21 RX ADMIN — MIDODRINE HYDROCHLORIDE 5 MILLIGRAM(S): 2.5 TABLET ORAL at 14:34

## 2017-08-21 RX ADMIN — OXCARBAZEPINE 300 MILLIGRAM(S): 300 TABLET, FILM COATED ORAL at 11:49

## 2017-08-21 RX ADMIN — INSULIN GLARGINE 14 UNIT(S): 100 INJECTION, SOLUTION SUBCUTANEOUS at 22:04

## 2017-08-21 RX ADMIN — ATORVASTATIN CALCIUM 40 MILLIGRAM(S): 80 TABLET, FILM COATED ORAL at 22:02

## 2017-08-21 RX ADMIN — Medication 2: at 12:01

## 2017-08-21 RX ADMIN — HALOPERIDOL DECANOATE 2 MILLIGRAM(S): 100 INJECTION INTRAMUSCULAR at 06:28

## 2017-08-21 RX ADMIN — HEPARIN SODIUM 5000 UNIT(S): 5000 INJECTION INTRAVENOUS; SUBCUTANEOUS at 14:34

## 2017-08-21 RX ADMIN — CHLORHEXIDINE GLUCONATE 15 MILLILITER(S): 213 SOLUTION TOPICAL at 17:30

## 2017-08-21 RX ADMIN — Medication 3 MILLILITER(S): at 22:43

## 2017-08-21 RX ADMIN — MEROPENEM 100 MILLIGRAM(S): 1 INJECTION INTRAVENOUS at 11:49

## 2017-08-21 RX ADMIN — LEVETIRACETAM 1500 MILLIGRAM(S): 250 TABLET, FILM COATED ORAL at 17:30

## 2017-08-21 RX ADMIN — Medication 2: at 23:02

## 2017-08-21 RX ADMIN — QUETIAPINE FUMARATE 75 MILLIGRAM(S): 200 TABLET, FILM COATED ORAL at 18:13

## 2017-08-21 RX ADMIN — Medication 100 MILLIEQUIVALENT(S): at 06:38

## 2017-08-21 RX ADMIN — CHLORHEXIDINE GLUCONATE 15 MILLILITER(S): 213 SOLUTION TOPICAL at 06:27

## 2017-08-21 RX ADMIN — Medication 3 MILLILITER(S): at 18:00

## 2017-08-21 RX ADMIN — PANTOPRAZOLE SODIUM 40 MILLIGRAM(S): 20 TABLET, DELAYED RELEASE ORAL at 11:49

## 2017-08-21 RX ADMIN — Medication 3 MILLILITER(S): at 05:50

## 2017-08-21 RX ADMIN — GABAPENTIN 300 MILLIGRAM(S): 400 CAPSULE ORAL at 17:31

## 2017-08-21 RX ADMIN — LEVETIRACETAM 1500 MILLIGRAM(S): 250 TABLET, FILM COATED ORAL at 06:25

## 2017-08-21 RX ADMIN — Medication 3 MILLILITER(S): at 14:00

## 2017-08-21 RX ADMIN — Medication 3 MILLILITER(S): at 01:14

## 2017-08-21 RX ADMIN — Medication 10 MILLIGRAM(S): at 11:49

## 2017-08-21 RX ADMIN — Medication 25 MILLIGRAM(S): at 17:31

## 2017-08-21 RX ADMIN — MIDODRINE HYDROCHLORIDE 5 MILLIGRAM(S): 2.5 TABLET ORAL at 22:02

## 2017-08-21 RX ADMIN — GABAPENTIN 300 MILLIGRAM(S): 400 CAPSULE ORAL at 06:22

## 2017-08-21 RX ADMIN — HEPARIN SODIUM 5000 UNIT(S): 5000 INJECTION INTRAVENOUS; SUBCUTANEOUS at 22:03

## 2017-08-21 RX ADMIN — HEPARIN SODIUM 5000 UNIT(S): 5000 INJECTION INTRAVENOUS; SUBCUTANEOUS at 06:26

## 2017-08-21 RX ADMIN — Medication 100 MILLIEQUIVALENT(S): at 08:02

## 2017-08-21 RX ADMIN — MEROPENEM 100 MILLIGRAM(S): 1 INJECTION INTRAVENOUS at 23:47

## 2017-08-21 RX ADMIN — MIDODRINE HYDROCHLORIDE 5 MILLIGRAM(S): 2.5 TABLET ORAL at 06:23

## 2017-08-21 RX ADMIN — QUETIAPINE FUMARATE 150 MILLIGRAM(S): 200 TABLET, FILM COATED ORAL at 06:28

## 2017-08-21 RX ADMIN — Medication 25 MILLIGRAM(S): at 06:27

## 2017-08-21 NOTE — PROGRESS NOTE ADULT - ASSESSMENT
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     NEURO  #AMS   -Likely 2/2 sepsis superimposed on baseline AD. Family reports that at baseline patient is alert and oriented.  CT not performed because of similar episodes that were due to infection.  -c/w abx (meropenem) to treat sepsis and achieve source control, as noted below  -c/w Seroquel 150 q8h for agitation  #Seizure  -Pt does not appear to be having clinical seizures at this time, likely not the etiology of AMS  -c/w home Keppra  -c/w home Tripleptal    CV  #Severe Sepsis 2/2 UTI vs. PNA   -Pt hypotensive w/ elevated lactate and likely source of infection urinary tract vs. PNA (hx aspiration pneumonitis)  -blood cx's growing Klebsiella, sensitivities to follow  -Urine growing Klebsiella/E.Coli/Enterococcus, likely the source of bacteremia  -repeat blood cx's drawn 8/17 negative to date  -sputum gram stain- numerous epithelial cells, mod yeast-like cells, few WBCs, rare GNRs, need to repeat Sputum cx  -No currently on NE, started on midodrine overnight.   -c/w meropenem as likely source is UTI and do not need Vancomycin  #Hypotension  -Likely 2/2 severe sepsis, currently off Levo and pressures have been maintained  -continue to tx underlying infection  - reduce steroids to dose 25mg hydrocortisone q8h  - Fluid overloaded 2/2 IVF - give metolazone 10mg BID    RESPIRATORY  #Hypercapnic Respiratory Failure s/p intubation  -Repeat VBG shows improvement of hypercapnia and oxygenation.    -continue on ventilator until mental status improves, currently off sedation but still w/ no response then trial of CPAP   -will need to evaluate ability to protect airway with questionable hx of tracheal deviation    GI  #Constipation/Diarrhea  -monitor for BM's     ID  #Klebsiella bacteremia  -Pt w/ hx of aspiration pneumonitis and ESBL UTI. CXR shows pleural effusions but no definitive infiltrate.  -f/u final sensitivities and de-escalate as appropriate  -c/w meropenem because of hx of ESBL  -do not need vanc anymore, as likely source is UTI     ENDO  #HHS (resolved)  -2/2 steroid use, blood glucose of 685 on admission.  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 PNA now s/p intubation for airway protection, admitted to MICU for monitoring.     NEURO  #AMS   -Likely 2/2 sepsis superimposed on baseline AD. Family reports that at baseline patient is alert and responsive.  CT not performed because of similar episodes that were due to infection.  -c/w abx (meropenem) to treat sepsis and achieve source control, as noted below  -decrease Seroquel to 75 q8h, as pt is off all sedation and still lethargic after Seroquel doses. Will need to reassess with decreased Seroquel for increased alertness, responsiveness as pt currently not a candidate for extubation even though tolerating CPAP 2/2 inability to protect airway w/ current level of responsiveness    #Seizure  -Pt does not appear to be having clinical seizures at this time, likely not the etiology of AMS  -c/w home Keppra  -c/w home Tripleptal    CV  #Septic Shock 2/2 UTI vs. PNA   -Pt hypotensive w/ elevated lactate and likely source of infection urinary tract vs. PNA (hx aspiration pneumonitis)  -blood cx's 8/16 growing Klebsiella ESBL sensitive to penems  -Urine cx's growing Klebsiella ESBL/E.Coli/Enterococcus, likely the source of bacteremia; Klebsiella/E.Coli sensitive to penems however meropenem not covering Enterococcus; however, pt has clinically improved w/ meropenem tx so will hold off on additional abx at this time  -Sputum cx now growing Pseudomonas and E.Coli ESBL as well; E.Coli sensitive to penem, however not currently covering pseudomonas. Pt continues to remain clinically improved w/ meropenem tx, so will hold off on adding additional anti-pseudomonal agent at this time, may just be colonization rather that clinical infectious picture  -repeat sputum cx rejected 2/2 contamination - will send repeat  -repeat blood cx's drawn 8/17 negative to date, will continue to follow  -if pt spikes fever, will re-cx and do full fever   -c/w meropenem 1g q12hrs (currently day #5)   -pt no longer requiring NE, will c/w midodrine 5 q8    #Hypotension  - Likely 2/2 septic shock, currently off Levo and pressures have been maintained w/ midodrine  - continue to tx underlying infection  - c/w steroids at dose 25mg hydrocortisone q12 and taper as tolerated   - no longer on NE, however pt continues on midodrine 5mg PO (PEG) q8hrs for pressure support  - Fluid overloaded 2/2 IVF - c/w metolazone 10mg BID    RESPIRATORY  #Hypercapnic Respiratory Failure s/p intubation  -Repeat VBG shows improvement of hypercapnia and oxygenation.    -continue on ventilator until mental status improves, currently off sedation but still w/ no response then trial of CPAP   -will need to evaluate ability to protect airway with questionable hx of tracheal deviation    GI  #Constipation/Diarrhea  -monitor for BM's     ID  #Klebsiella bacteremia  -Pt w/ hx of aspiration pneumonitis and ESBL UTI. CXR shows pleural effusions but no definitive infiltrate.  -f/u final sensitivities and de-escalate as appropriate  -c/w meropenem because of hx of ESBL  -do not need vanc anymore, as likely source is UTI     ENDO  #HHS (resolved)  -2/2 steroid use, blood glucose of 685 on admission.  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 PNA now s/p intubation for airway protection, admitted to MICU for monitoring.     NEURO  #AMS   -Likely 2/2 sepsis superimposed on baseline AD. Family reports that at baseline patient is alert and responsive.  CT not performed because of similar episodes that were due to infection.  -c/w abx (meropenem) to treat sepsis and achieve source control, as noted below  -decrease Seroquel to 75 q8h (from 150 q8) and Haldol to 1mg BID (from 2mg BID), as pt is off all sedation and still lethargic after Seroquel doses. Will need to reassess with decreased Seroquel for increased alertness, responsiveness as pt currently not a candidate for extubation even though tolerating CPAP 2/2 inability to protect airway w/ current level of responsiveness  -possibility of need for trach d/w family over the weekend, family not amenable to trach at this time; present at bedside this AM and agree w/ plan to decrease Seroquel/Haldol and see if pt's mental status improves for trial of extubation  #Seizure  -Pt does not appear to be having clinical seizures at this time, likely not the etiology of AMS  -c/w home Keppra  -c/w home Tripleptal    CV  #Septic Shock 2/2 UTI vs. PNA   -Pt hypotensive w/ elevated lactate and likely source of infection urinary tract vs. PNA (hx aspiration pneumonitis)  -blood cx's 8/16 growing Klebsiella ESBL sensitive to penems  -Urine cx's growing Klebsiella ESBL/E.Coli/Enterococcus, likely the source of bacteremia; Klebsiella/E.Coli sensitive to penems however meropenem not covering Enterococcus; however, pt has clinically improved w/ meropenem tx so will hold off on additional abx at this time  -Sputum cx now growing Pseudomonas and E.Coli ESBL as well; E.Coli sensitive to penem, however not currently covering pseudomonas. Pt continues to remain clinically improved w/ meropenem tx, so will hold off on adding additional anti-pseudomonal agent at this time, may just be colonization rather that clinical infectious picture  -repeat sputum cx rejected 2/2 contamination - will send repeat  -repeat blood cx's drawn 8/17 negative to date, will continue to follow  -if pt spikes fever, will re-cx and do full fever   -c/w meropenem 1g q12hrs (currently day #5)   -pt no longer requiring NE, will c/w midodrine 5 q8 (decreased from 10)  #Hypotension  - Likely 2/2 septic shock, currently off Levo and pressures have been maintained w/ midodrine  - continue to tx underlying infection  - c/w steroids at dose 25mg hydrocortisone q12 and taper as tolerated   - no longer on NE, however pt continues on midodrine 5mg PO (PEG) q8hrs for pressure support  - Fluid overloaded 2/2 IVF - c/w metolazone 10mg BID and monitor UOP and pressures in the setting of diuresis    RESPIRATORY  #Acute on chronic hypercapnic respiratory failure s/p intubation  -Repeat VBG shows improvement of hypercapnia and oxygenation.    -continue on ventilator until mental status improves, currently tolerating trial of CPAP, but not candidate for trial of extubation until mental status improves enough for pt to protect airway    GI  #Constipation/Diarrhea  -monitor for BM's     ID  #Klebsiella bacteremia  -Pt w/ hx of aspiration pneumonitis and ESBL UTI. CXR shows pleural effusions but no definitive infiltrate.  -Blood: Klebsiella sensitive to penems; Urine: Klebsiella ESBL sensitive to penem/E.Coli ESBL sensitive to penem/Enterococcus; Sputum: E.Coli ESBl sensitive to penem/Pseudomonas sensitive to Cipro/gent/Zosyn  -c/w meropenem, as above    ENDO  #HHS (resolved)  -2/2 steroid use, blood glucose of 685 on admission.  -insulin gtt d/c'd as glucose downtrended and IVF d/c'd in the setting of volume overload  -transitioned to FSG/SSI + Lantus 14u at bedtime    FEN  #Fluids: none  #Monitor, Replete to K>4 and Mg>2  #Diet: Glucerna 1.2 w/ goal rate 60/hr    PPX  -SQH 5000 u q8h  -Protonix 40 IV qd    Code Status: FULL CODE

## 2017-08-21 NOTE — PROGRESS NOTE ADULT - SUBJECTIVE AND OBJECTIVE BOX
INTERVAL HPI/OVERNIGHT EVENTS: RAJEEV    SUBJECTIVE: Patient seen and examined at bedside.    OBJECTIVE:  VITAL SIGNS:  ICU Vital Signs Last 24 Hrs  T(C): 37.1 (21 Aug 2017 01:37), Max: 37.4 (20 Aug 2017 09:58)  T(F): 98.7 (21 Aug 2017 01:37), Max: 99.3 (20 Aug 2017 09:58)  HR: 60 (21 Aug 2017 06:05) (50 - 68)  BP: 109/58 (21 Aug 2017 06:00) (84/46 - 135/60)  BP(mean): 80 (21 Aug 2017 06:00) (54 - 96)  RR: 10 (21 Aug 2017 06:00) (10 - 23)  SpO2: 100% (21 Aug 2017 06:05) (94% - 100%)    Mode: CPAP with PS  FiO2: 40  PEEP: 5  PS: 5  MAP: 7  PIP: 10    08-19 @ 07:01  -  08-20 @ 07:00  --------------------------------------------------------  IN: 1263.5 mL / OUT: 1390 mL / NET: -126.5 mL    08-20 @ 07:01  -  08-21 @ 06:15  --------------------------------------------------------  IN: 1690 mL / OUT: 1730 mL / NET: -40 mL      CAPILLARY BLOOD GLUCOSE  143 (20 Aug 2017 18:00)      PHYSICAL EXAM:    General: NAD  HEENT: NC/AT; PERRL, clear conjunctiva  Neck: supple  Respiratory: CTA b/l  Cardiovascular: +S1/S2; RRR  Abdomen: soft, NT/ND; +BS x4  Extremities: WWP, 2+ peripheral pulses b/l; no LE edema  Skin: normal color and turgor; no rash  Neurological:     MEDICATIONS:  MEDICATIONS  (STANDING):  atorvastatin 40 milliGRAM(s) Oral at bedtime  busPIRone 10 milliGRAM(s) Oral daily  haloperidol     Tablet 2 milliGRAM(s) Oral two times a day  gabapentin 300 milliGRAM(s) Oral two times a day  ALBUTerol/ipratropium for Nebulization 3 milliLiter(s) Nebulizer every 4 hours  pantoprazole  Injectable 40 milliGRAM(s) IV Push daily  chlorhexidine 0.12% Liquid 15 milliLiter(s) Swish and Spit two times a day  QUEtiapine 150 milliGRAM(s) Oral every 8 hours  levETIRAcetam  Solution 1500 milliGRAM(s) Oral two times a day  OXcarbazepine Suspension 300 milliGRAM(s) Oral daily  OXcarbazepine Suspension 450 milliGRAM(s) Oral at bedtime  heparin  Injectable 5000 Unit(s) SubCutaneous every 8 hours  meropenem IVPB 1000 milliGRAM(s) IV Intermittent every 12 hours  insulin lispro (HumaLOG) corrective regimen sliding scale   SubCutaneous every 6 hours  dextrose 5%. 1000 milliLiter(s) (50 mL/Hr) IV Continuous <Continuous>  dextrose 50% Injectable 12.5 Gram(s) IV Push once  dextrose 50% Injectable 25 Gram(s) IV Push once  dextrose 50% Injectable 25 Gram(s) IV Push once  midodrine 5 milliGRAM(s) Oral every 8 hours  insulin glargine Injectable (LANTUS) 14 Unit(s) SubCutaneous at bedtime  metolazone 10 milliGRAM(s) Oral two times a day  hydrocortisone sodium succinate Injectable 25 milliGRAM(s) IV Push every 12 hours  potassium chloride  20 mEq/100 mL IVPB 20 milliEquivalent(s) IV Intermittent every 1 hour    MEDICATIONS  (PRN):  dextrose Gel 1 Dose(s) Oral once PRN Blood Glucose LESS THAN 70 milliGRAM(s)/deciliter  glucagon  Injectable 1 milliGRAM(s) IntraMuscular once PRN Glucose LESS THAN 70 milligrams/deciliter      ALLERGIES:  Allergies    No Known Allergies    Intolerances        LABS:                        8.1    6.4   )-----------( 143      ( 21 Aug 2017 04:05 )             26.3     08-21    146<H>  |  106  |  25<H>  ----------------------------<  131<H>  3.6   |  34<H>  |  0.60    Ca    8.8      21 Aug 2017 04:05  Phos  3.3     08-21  Mg     2.4     08-21    TPro  5.8<L>  /  Alb  2.6<L>  /  TBili  0.2  /  DBili  x   /  AST  25  /  ALT  38  /  AlkPhos  109  08-21          RADIOLOGY & ADDITIONAL TESTS: Reviewed. INTERVAL HPI/OVERNIGHT EVENTS: RAJEEV    SUBJECTIVE: Patient seen and examined at bedside. Pt intubated, no longer on sedation or pressors, only on Seroquel. Early this AM, pt more alert, agitated than previously. However, still not responsive to name or commands. Withdraws from pain. Tolerating trial of CPAP w/ PS w/o respiratory distress.    OBJECTIVE:  VITAL SIGNS:  ICU Vital Signs Last 24 Hrs  T(C): 37.1 (21 Aug 2017 01:37), Max: 37.4 (20 Aug 2017 09:58)  T(F): 98.7 (21 Aug 2017 01:37), Max: 99.3 (20 Aug 2017 09:58)  HR: 60 (21 Aug 2017 06:05) (50 - 68)  BP: 109/58 (21 Aug 2017 06:00) (84/46 - 135/60)  BP(mean): 80 (21 Aug 2017 06:00) (54 - 96)  RR: 10 (21 Aug 2017 06:00) (10 - 23)  SpO2: 100% (21 Aug 2017 06:05) (94% - 100%)    Mode: CPAP with PS  FiO2: 40  PEEP: 5  PS: 5  MAP: 7  PIP: 10    08-19 @ 07:01  -  08-20 @ 07:00  --------------------------------------------------------  IN: 1263.5 mL / OUT: 1390 mL / NET: -126.5 mL    08-20 @ 07:01  -  08-21 @ 06:15  --------------------------------------------------------  IN: 1690 mL / OUT: 1730 mL / NET: -40 mL      CAPILLARY BLOOD GLUCOSE  143 (20 Aug 2017 18:00)      PHYSICAL EXAM:    General: intubated, not on sedation, opens eyes but does not respond to commands or name, appears comfortable  HEENT: NC/AT; PERRL  Respiratory: decreased BS b/l; diffuse rhonchi b/l  Cardiovascular: +S1/S2; no M/R/G heard  Abdomen: PEG in place, appears clean and dry; soft, NT/ND; +BS   Extremities: significant contractures of UE, most notably on the left; WWP; no edema b/l LE  Neurological: AAOx0. Opens eyes, but not interactive. Withdraws from painful stimuli. Pupils sluggish but reactive. +gag reflex.     MEDICATIONS:  MEDICATIONS  (STANDING):  atorvastatin 40 milliGRAM(s) Oral at bedtime  busPIRone 10 milliGRAM(s) Oral daily  haloperidol     Tablet 2 milliGRAM(s) Oral two times a day  gabapentin 300 milliGRAM(s) Oral two times a day  ALBUTerol/ipratropium for Nebulization 3 milliLiter(s) Nebulizer every 4 hours  pantoprazole  Injectable 40 milliGRAM(s) IV Push daily  chlorhexidine 0.12% Liquid 15 milliLiter(s) Swish and Spit two times a day  QUEtiapine 150 milliGRAM(s) Oral every 8 hours  levETIRAcetam  Solution 1500 milliGRAM(s) Oral two times a day  OXcarbazepine Suspension 300 milliGRAM(s) Oral daily  OXcarbazepine Suspension 450 milliGRAM(s) Oral at bedtime  heparin  Injectable 5000 Unit(s) SubCutaneous every 8 hours  meropenem IVPB 1000 milliGRAM(s) IV Intermittent every 12 hours  insulin lispro (HumaLOG) corrective regimen sliding scale   SubCutaneous every 6 hours  dextrose 5%. 1000 milliLiter(s) (50 mL/Hr) IV Continuous <Continuous>  dextrose 50% Injectable 12.5 Gram(s) IV Push once  dextrose 50% Injectable 25 Gram(s) IV Push once  dextrose 50% Injectable 25 Gram(s) IV Push once  midodrine 5 milliGRAM(s) Oral every 8 hours  insulin glargine Injectable (LANTUS) 14 Unit(s) SubCutaneous at bedtime  metolazone 10 milliGRAM(s) Oral two times a day  hydrocortisone sodium succinate Injectable 25 milliGRAM(s) IV Push every 12 hours  potassium chloride  20 mEq/100 mL IVPB 20 milliEquivalent(s) IV Intermittent every 1 hour    MEDICATIONS  (PRN):  dextrose Gel 1 Dose(s) Oral once PRN Blood Glucose LESS THAN 70 milliGRAM(s)/deciliter  glucagon  Injectable 1 milliGRAM(s) IntraMuscular once PRN Glucose LESS THAN 70 milligrams/deciliter      ALLERGIES:  Allergies    No Known Allergies    Intolerances        LABS:                        8.1    6.4   )-----------( 143      ( 21 Aug 2017 04:05 )             26.3     08-21    146<H>  |  106  |  25<H>  ----------------------------<  131<H>  3.6   |  34<H>  |  0.60    Ca    8.8      21 Aug 2017 04:05  Phos  3.3     08-21  Mg     2.4     08-21    TPro  5.8<L>  /  Alb  2.6<L>  /  TBili  0.2  /  DBili  x   /  AST  25  /  ALT  38  /  AlkPhos  109  08-21          RADIOLOGY & ADDITIONAL TESTS: Reviewed. INTERVAL HPI/OVERNIGHT EVENTS: RAJEEV    SUBJECTIVE: Patient seen and examined at bedside. Pt intubated, no longer on sedation or pressors, only on Seroquel. Early this AM, pt more alert, agitated than previously. However, still not responsive to name or commands. Withdraws from pain. Tolerating trial of CPAP w/ PS w/o respiratory distress.    OBJECTIVE:  VITAL SIGNS:  ICU Vital Signs Last 24 Hrs  T(C): 37.1 (21 Aug 2017 01:37), Max: 37.4 (20 Aug 2017 09:58)  T(F): 98.7 (21 Aug 2017 01:37), Max: 99.3 (20 Aug 2017 09:58)  HR: 60 (21 Aug 2017 06:05) (50 - 68)  BP: 109/58 (21 Aug 2017 06:00) (84/46 - 135/60)  BP(mean): 80 (21 Aug 2017 06:00) (54 - 96)  RR: 10 (21 Aug 2017 06:00) (10 - 23)  SpO2: 100% (21 Aug 2017 06:05) (94% - 100%)    Mode: CPAP with PS  FiO2: 40  PEEP: 5  PS: 5  MAP: 7  PIP: 10    08-19 @ 07:01  -  08-20 @ 07:00  --------------------------------------------------------  IN: 1263.5 mL / OUT: 1390 mL / NET: -126.5 mL    08-20 @ 07:01  -  08-21 @ 06:15  --------------------------------------------------------  IN: 1690 mL / OUT: 1730 mL / NET: -40 mL      CAPILLARY BLOOD GLUCOSE  143 (20 Aug 2017 18:00)      PHYSICAL EXAM:    General: intubated, not on sedation, opens eyes but does not respond to commands or name, appears comfortable  HEENT: NC/AT; PERRL  Respiratory: decreased BS b/l; diffuse rhonchi b/l  Cardiovascular: +S1/S2; no M/R/G heard  Abdomen: PEG in place, site appears clean and dry; soft, NT/ND; +BS   Extremities: significant contractures of UE, most notably on the left; WWP; no edema b/l LE  Neurological: AAOx0. Opens eyes, but not interactive. Does not respond to name or verbal stimuli. Withdraws from painful stimuli. Pupils sluggish but reactive. +gag reflex today. +corneal reflex.    MEDICATIONS:  MEDICATIONS  (STANDING):  atorvastatin 40 milliGRAM(s) Oral at bedtime  busPIRone 10 milliGRAM(s) Oral daily  haloperidol     Tablet 2 milliGRAM(s) Oral two times a day  gabapentin 300 milliGRAM(s) Oral two times a day  ALBUTerol/ipratropium for Nebulization 3 milliLiter(s) Nebulizer every 4 hours  pantoprazole  Injectable 40 milliGRAM(s) IV Push daily  chlorhexidine 0.12% Liquid 15 milliLiter(s) Swish and Spit two times a day  QUEtiapine 150 milliGRAM(s) Oral every 8 hours  levETIRAcetam  Solution 1500 milliGRAM(s) Oral two times a day  OXcarbazepine Suspension 300 milliGRAM(s) Oral daily  OXcarbazepine Suspension 450 milliGRAM(s) Oral at bedtime  heparin  Injectable 5000 Unit(s) SubCutaneous every 8 hours  meropenem IVPB 1000 milliGRAM(s) IV Intermittent every 12 hours  insulin lispro (HumaLOG) corrective regimen sliding scale   SubCutaneous every 6 hours  dextrose 5%. 1000 milliLiter(s) (50 mL/Hr) IV Continuous <Continuous>  dextrose 50% Injectable 12.5 Gram(s) IV Push once  dextrose 50% Injectable 25 Gram(s) IV Push once  dextrose 50% Injectable 25 Gram(s) IV Push once  midodrine 5 milliGRAM(s) Oral every 8 hours  insulin glargine Injectable (LANTUS) 14 Unit(s) SubCutaneous at bedtime  metolazone 10 milliGRAM(s) Oral two times a day  hydrocortisone sodium succinate Injectable 25 milliGRAM(s) IV Push every 12 hours  potassium chloride  20 mEq/100 mL IVPB 20 milliEquivalent(s) IV Intermittent every 1 hour    MEDICATIONS  (PRN):  dextrose Gel 1 Dose(s) Oral once PRN Blood Glucose LESS THAN 70 milliGRAM(s)/deciliter  glucagon  Injectable 1 milliGRAM(s) IntraMuscular once PRN Glucose LESS THAN 70 milligrams/deciliter      ALLERGIES:  Allergies    No Known Allergies    Intolerances        LABS:                        8.1    6.4   )-----------( 143      ( 21 Aug 2017 04:05 )             26.3     08-21    146<H>  |  106  |  25<H>  ----------------------------<  131<H>  3.6   |  34<H>  |  0.60    Ca    8.8      21 Aug 2017 04:05  Phos  3.3     08-21  Mg     2.4     08-21    TPro  5.8<L>  /  Alb  2.6<L>  /  TBili  0.2  /  DBili  x   /  AST  25  /  ALT  38  /  AlkPhos  109  08-21          RADIOLOGY & ADDITIONAL TESTS: Reviewed.

## 2017-08-22 ENCOUNTER — TRANSCRIPTION ENCOUNTER (OUTPATIENT)
Age: 82
End: 2017-08-22

## 2017-08-22 LAB
ANION GAP SERPL CALC-SCNC: 8 MMOL/L — SIGNIFICANT CHANGE UP (ref 5–17)
BUN SERPL-MCNC: 25 MG/DL — HIGH (ref 7–23)
CALCIUM SERPL-MCNC: 9.1 MG/DL — SIGNIFICANT CHANGE UP (ref 8.4–10.5)
CHLORIDE SERPL-SCNC: 103 MMOL/L — SIGNIFICANT CHANGE UP (ref 96–108)
CO2 SERPL-SCNC: 32 MMOL/L — HIGH (ref 22–31)
CREAT SERPL-MCNC: 0.6 MG/DL — SIGNIFICANT CHANGE UP (ref 0.5–1.3)
CULTURE RESULTS: SIGNIFICANT CHANGE UP
GLUCOSE SERPL-MCNC: 121 MG/DL — HIGH (ref 70–99)
HCT VFR BLD CALC: 28.4 % — LOW (ref 39–50)
HGB BLD-MCNC: 8.8 G/DL — LOW (ref 13–17)
MAGNESIUM SERPL-MCNC: 2.3 MG/DL — SIGNIFICANT CHANGE UP (ref 1.6–2.6)
MCHC RBC-ENTMCNC: 29.9 PG — SIGNIFICANT CHANGE UP (ref 27–34)
MCHC RBC-ENTMCNC: 31 G/DL — LOW (ref 32–36)
MCV RBC AUTO: 96.6 FL — SIGNIFICANT CHANGE UP (ref 80–100)
PHOSPHATE SERPL-MCNC: 3.4 MG/DL — SIGNIFICANT CHANGE UP (ref 2.5–4.5)
PLATELET # BLD AUTO: 161 K/UL — SIGNIFICANT CHANGE UP (ref 150–400)
POTASSIUM SERPL-MCNC: 3.9 MMOL/L — SIGNIFICANT CHANGE UP (ref 3.5–5.3)
POTASSIUM SERPL-SCNC: 3.9 MMOL/L — SIGNIFICANT CHANGE UP (ref 3.5–5.3)
RBC # BLD: 2.94 M/UL — LOW (ref 4.2–5.8)
RBC # FLD: 13.8 % — SIGNIFICANT CHANGE UP (ref 10.3–16.9)
SODIUM SERPL-SCNC: 143 MMOL/L — SIGNIFICANT CHANGE UP (ref 135–145)
SPECIMEN SOURCE: SIGNIFICANT CHANGE UP
SPECIMEN SOURCE: SIGNIFICANT CHANGE UP
WBC # BLD: 7.6 K/UL — SIGNIFICANT CHANGE UP (ref 3.8–10.5)
WBC # FLD AUTO: 7.6 K/UL — SIGNIFICANT CHANGE UP (ref 3.8–10.5)

## 2017-08-22 PROCEDURE — 99233 SBSQ HOSP IP/OBS HIGH 50: CPT | Mod: GC

## 2017-08-22 PROCEDURE — 71010: CPT | Mod: 26

## 2017-08-22 RX ORDER — HYDROCORTISONE 20 MG
25 TABLET ORAL DAILY
Qty: 0 | Refills: 0 | Status: DISCONTINUED | OUTPATIENT
Start: 2017-08-23 | End: 2017-08-23

## 2017-08-22 RX ORDER — MIDODRINE HYDROCHLORIDE 2.5 MG/1
5 TABLET ORAL EVERY 12 HOURS
Qty: 0 | Refills: 0 | Status: DISCONTINUED | OUTPATIENT
Start: 2017-08-22 | End: 2017-08-23

## 2017-08-22 RX ORDER — INSULIN GLARGINE 100 [IU]/ML
7 INJECTION, SOLUTION SUBCUTANEOUS AT BEDTIME
Qty: 0 | Refills: 0 | Status: DISCONTINUED | OUTPATIENT
Start: 2017-08-22 | End: 2017-08-23

## 2017-08-22 RX ADMIN — Medication 3 MILLILITER(S): at 09:29

## 2017-08-22 RX ADMIN — HEPARIN SODIUM 5000 UNIT(S): 5000 INJECTION INTRAVENOUS; SUBCUTANEOUS at 14:58

## 2017-08-22 RX ADMIN — QUETIAPINE FUMARATE 75 MILLIGRAM(S): 200 TABLET, FILM COATED ORAL at 02:24

## 2017-08-22 RX ADMIN — INSULIN GLARGINE 7 UNIT(S): 100 INJECTION, SOLUTION SUBCUTANEOUS at 23:56

## 2017-08-22 RX ADMIN — MIDODRINE HYDROCHLORIDE 5 MILLIGRAM(S): 2.5 TABLET ORAL at 18:05

## 2017-08-22 RX ADMIN — Medication 3 MILLILITER(S): at 06:26

## 2017-08-22 RX ADMIN — HEPARIN SODIUM 5000 UNIT(S): 5000 INJECTION INTRAVENOUS; SUBCUTANEOUS at 06:06

## 2017-08-22 RX ADMIN — LEVETIRACETAM 1500 MILLIGRAM(S): 250 TABLET, FILM COATED ORAL at 06:08

## 2017-08-22 RX ADMIN — HALOPERIDOL DECANOATE 1 MILLIGRAM(S): 100 INJECTION INTRAMUSCULAR at 18:06

## 2017-08-22 RX ADMIN — OXCARBAZEPINE 450 MILLIGRAM(S): 300 TABLET, FILM COATED ORAL at 22:07

## 2017-08-22 RX ADMIN — LEVETIRACETAM 1500 MILLIGRAM(S): 250 TABLET, FILM COATED ORAL at 18:06

## 2017-08-22 RX ADMIN — Medication 3 MILLILITER(S): at 17:00

## 2017-08-22 RX ADMIN — MEROPENEM 100 MILLIGRAM(S): 1 INJECTION INTRAVENOUS at 13:10

## 2017-08-22 RX ADMIN — Medication 3 MILLILITER(S): at 13:20

## 2017-08-22 RX ADMIN — QUETIAPINE FUMARATE 75 MILLIGRAM(S): 200 TABLET, FILM COATED ORAL at 22:06

## 2017-08-22 RX ADMIN — ATORVASTATIN CALCIUM 40 MILLIGRAM(S): 80 TABLET, FILM COATED ORAL at 22:06

## 2017-08-22 RX ADMIN — GABAPENTIN 300 MILLIGRAM(S): 400 CAPSULE ORAL at 18:05

## 2017-08-22 RX ADMIN — CHLORHEXIDINE GLUCONATE 15 MILLILITER(S): 213 SOLUTION TOPICAL at 18:06

## 2017-08-22 RX ADMIN — HEPARIN SODIUM 5000 UNIT(S): 5000 INJECTION INTRAVENOUS; SUBCUTANEOUS at 22:06

## 2017-08-22 RX ADMIN — Medication 3 MILLILITER(S): at 23:06

## 2017-08-22 RX ADMIN — Medication 10 MILLIGRAM(S): at 13:11

## 2017-08-22 RX ADMIN — QUETIAPINE FUMARATE 75 MILLIGRAM(S): 200 TABLET, FILM COATED ORAL at 14:14

## 2017-08-22 RX ADMIN — CHLORHEXIDINE GLUCONATE 15 MILLILITER(S): 213 SOLUTION TOPICAL at 06:06

## 2017-08-22 RX ADMIN — Medication 3 MILLILITER(S): at 01:39

## 2017-08-22 RX ADMIN — Medication 25 MILLIGRAM(S): at 06:00

## 2017-08-22 RX ADMIN — OXCARBAZEPINE 300 MILLIGRAM(S): 300 TABLET, FILM COATED ORAL at 13:10

## 2017-08-22 RX ADMIN — MEROPENEM 100 MILLIGRAM(S): 1 INJECTION INTRAVENOUS at 23:59

## 2017-08-22 RX ADMIN — MIDODRINE HYDROCHLORIDE 5 MILLIGRAM(S): 2.5 TABLET ORAL at 06:03

## 2017-08-22 RX ADMIN — PANTOPRAZOLE SODIUM 40 MILLIGRAM(S): 20 TABLET, DELAYED RELEASE ORAL at 13:10

## 2017-08-22 NOTE — CHART NOTE - NSCHARTNOTEFT_GEN_A_CORE
ICU team spoke to patient's healthcare proxy about patient's current mental status and his inability to safely protect his airway. As a result, 2 options are offered, either tracheostomy is performed now or patient will be extubated and in case of re-intubation tracheostomy will be then performed subsequently. Healthcare proxy and Mr Turpin spoke to ICU team and now want to move on with extubation and agree to tracheostomy if patient gets re-intubated. ICU team spoke to patient's healthcare proxy about patient's current mental status and his inability to safely protect his airway. As a result, 2 options are offered, either tracheostomy is performed now or patient will be extubated and in case of re-intubation tracheostomy will be then performed subsequently. Healthcare proxy and Mr Turpin spoke to ICU team and now want to move on with extubation and agree to tracheostomy if patient gets re-intubated. Decision discussed with Dr. Shah, Dr. Peralta, and Dr. England.

## 2017-08-22 NOTE — DISCHARGE NOTE ADULT - PLAN OF CARE
To treat and contain the source of infection. You had shortness of breath, confusion on admission with a low blood pressure. This was found to be due to  infection in the urine (Urinary tract infection) and as well as in the lungs( Pneumonia) which was confirmed with urine, sputum and blood cultures which grew bacteria. You were treated with intravenous antibiotics (vancomycin and meropenam) in the hospital. You are being discharged to home with respiratory support  an oral antibiotic Bactrum. Your confusion and shortness of breath was most likely in the setting of sepsis secondary to infection of the urinary tract which was confirmed with urine cultures. You were treated with iv antibiotics (meropenam) and now being discharged on oral antibiotics (bactrum). You has shortness of breath on admission with low levels of oxygen and high carbon dioxide. You were put on ventilator for respiratory support and your respiratory condition was most likely due to underlying lung infection, as evident on sputum and blood cultures. You were extubated after family's persistence and thereafter put on aerosol mask, extensive discussion was held with family regarding possible adverse consequences( acute respiratory failure) after extubation. You are being discharged currently to home with respiratory therapist as per family's wishes. You had high sugars on admission (685) and were started on insulin drip following which this was resolved and the drip was stopped. You has shortness of breath on admission with low levels of oxygen and high carbon dioxide. You were put on ventilator for respiratory support and your respiratory condition was most likely due to underlying lung infection, as evident on sputum and blood cultures. You were extubated after family's persistence and thereafter put on 4lt NC, extensive discussion was held with family regarding possible adverse consequences( acute respiratory failure) after extubation. You are being discharged  to home (4 Lt NC) with respiratory therapist as per family's wishes. You had high sugars on admission (685) and were started on insulin drip following which this was resolved and the drip was stopped. You are being discharged on tube feeds with Jevity (1/2 can every 4 hours) 3-4 cans per day.

## 2017-08-22 NOTE — DISCHARGE NOTE ADULT - PATIENT PORTAL LINK FT
“You can access the [facility] Patient Portal, offered by [facility], by registering with the following website: [email or web address]”

## 2017-08-22 NOTE — PROGRESS NOTE ADULT - SUBJECTIVE AND OBJECTIVE BOX
INTERVAL HPI/OVERNIGHT EVENTS: RAJEEV.     SUBJECTIVE: Patient seen and examined at bedside.    OBJECTIVE:  VITAL SIGNS:  ICU Vital Signs Last 24 Hrs  T(C): 36.6 (22 Aug 2017 01:00), Max: 37.1 (21 Aug 2017 07:05)  T(F): 97.9 (22 Aug 2017 01:00), Max: 98.7 (21 Aug 2017 07:05)  HR: 65 (22 Aug 2017 06:26) (48 - 80)  BP: 141/66 (22 Aug 2017 06:00) (78/42 - 141/66)  BP(mean): 93 (22 Aug 2017 06:00) (58 - 103)  RR: 17 (22 Aug 2017 06:26) (9 - 32)  SpO2: 99% (22 Aug 2017 06:26) (95% - 100%)    Mode: CPAP with PS  FiO2: 40  PEEP: 5  PS: 8  MAP: 7  PIP: 14    08-20 @ 07:01  -  08-21 @ 07:00  --------------------------------------------------------  IN: 2190 mL / OUT: 1855 mL / NET: 335 mL    08-21 @ 07:01  -  08-22 @ 06:50  --------------------------------------------------------  IN: 1430 mL / OUT: 1795 mL / NET: -365 mL      CAPILLARY BLOOD GLUCOSE  165 (21 Aug 2017 22:00)    PHYSICAL EXAM:    Constitutional: NAD, intubated, elderly male  HEENT: NC/AT; PERRL, anicteric sclera; MMM  Neck: supple, no JVD  Cardiovascular: +S1/S2, RRR  Respiratory: decreased breath sounds throughout lungs, no W/R/R  Gastrointestinal: abdomen soft, NT/ND; no rebound or guarding; +BSx4  Genitourinary: no suprapubic tenderness or fullness  Extremities: WWP; 1+ bilateral edema; no clubbing or cyanosis  Vascular: 2+ radial, DP/PT and femoral pulses B/L  Dermatologic: normal color and turgor; no visible rashes  Neurological: A&Ox0, does not respond to verbal stimuli. Does not respond to tactile stimuli including pain in extremities and sternal rub    MEDICATIONS:  MEDICATIONS  (STANDING):  atorvastatin 40 milliGRAM(s) Oral at bedtime  busPIRone 10 milliGRAM(s) Oral daily  gabapentin 300 milliGRAM(s) Oral two times a day  ALBUTerol/ipratropium for Nebulization 3 milliLiter(s) Nebulizer every 4 hours  pantoprazole  Injectable 40 milliGRAM(s) IV Push daily  chlorhexidine 0.12% Liquid 15 milliLiter(s) Swish and Spit two times a day  levETIRAcetam  Solution 1500 milliGRAM(s) Oral two times a day  OXcarbazepine Suspension 300 milliGRAM(s) Oral daily  OXcarbazepine Suspension 450 milliGRAM(s) Oral at bedtime  heparin  Injectable 5000 Unit(s) SubCutaneous every 8 hours  meropenem IVPB 1000 milliGRAM(s) IV Intermittent every 12 hours  insulin lispro (HumaLOG) corrective regimen sliding scale   SubCutaneous every 6 hours  dextrose 5%. 1000 milliLiter(s) (50 mL/Hr) IV Continuous <Continuous>  dextrose 50% Injectable 12.5 Gram(s) IV Push once  dextrose 50% Injectable 25 Gram(s) IV Push once  dextrose 50% Injectable 25 Gram(s) IV Push once  midodrine 5 milliGRAM(s) Oral every 8 hours  insulin glargine Injectable (LANTUS) 14 Unit(s) SubCutaneous at bedtime  metolazone 10 milliGRAM(s) Oral two times a day  hydrocortisone sodium succinate Injectable 25 milliGRAM(s) IV Push every 12 hours  haloperidol     Tablet 1 milliGRAM(s) Oral two times a day  QUEtiapine 75 milliGRAM(s) Oral every 8 hours    MEDICATIONS  (PRN):  dextrose Gel 1 Dose(s) Oral once PRN Blood Glucose LESS THAN 70 milliGRAM(s)/deciliter  glucagon  Injectable 1 milliGRAM(s) IntraMuscular once PRN Glucose LESS THAN 70 milligrams/deciliter      ALLERGIES:  Allergies    No Known Allergies    Intolerances        LABS:                        8.8    7.6   )-----------( 161      ( 22 Aug 2017 05:37 )             28.4     08-22    143  |  103  |  25<H>  ----------------------------<  121<H>  3.9   |  32<H>  |  0.60    Ca    9.1      22 Aug 2017 05:37  Phos  3.4     08-22  Mg     2.3     08-22    TPro  5.8<L>  /  Alb  2.6<L>  /  TBili  0.2  /  DBili  x   /  AST  25  /  ALT  38  /  AlkPhos  109  08-21      RADIOLOGY & ADDITIONAL TESTS: Reviewed. 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 PNA now s/p intubation for airway protection, admitted to MICU for monitoring.     INTERVAL HPI/OVERNIGHT EVENTS: RAJEEV.     SUBJECTIVE: Patient seen and examined at bedside.    OBJECTIVE:  VITAL SIGNS:  ICU Vital Signs Last 24 Hrs  T(C): 36.6 (22 Aug 2017 01:00), Max: 37.1 (21 Aug 2017 07:05)  T(F): 97.9 (22 Aug 2017 01:00), Max: 98.7 (21 Aug 2017 07:05)  HR: 65 (22 Aug 2017 06:26) (48 - 80)  BP: 141/66 (22 Aug 2017 06:00) (78/42 - 141/66)  BP(mean): 93 (22 Aug 2017 06:00) (58 - 103)  RR: 17 (22 Aug 2017 06:26) (9 - 32)  SpO2: 99% (22 Aug 2017 06:26) (95% - 100%)    Mode: CPAP with PS  FiO2: 40  PEEP: 5  PS: 8  MAP: 7  PIP: 14    08-20 @ 07:01 - 08-21 @ 07:00  --------------------------------------------------------  IN: 2190 mL / OUT: 1855 mL / NET: 335 mL    08-21 @ 07:01  -  08-22 @ 06:50  --------------------------------------------------------  IN: 1430 mL / OUT: 1795 mL / NET: -365 mL      CAPILLARY BLOOD GLUCOSE  165 (21 Aug 2017 22:00)    PHYSICAL EXAM:    Constitutional: NAD, intubated, elderly male  HEENT: NC/AT; PERRL, anicteric sclera; MMM  Neck: supple, no JVD  Cardiovascular: +S1/S2, RRR  Respiratory: decreased breath sounds throughout lungs, no W/R/R  Gastrointestinal: abdomen soft, NT/ND; no rebound or guarding; +BSx4  Genitourinary: no suprapubic tenderness or fullness  Extremities: WWP; 1+ bilateral edema; no clubbing or cyanosis  Vascular: 2+ radial, DP/PT and femoral pulses B/L  Dermatologic: normal color and turgor; no visible rashes  Neurological: A&Ox0, does not respond to verbal stimuli. Does not respond to tactile stimuli including pain in extremities and sternal rub    MEDICATIONS:  MEDICATIONS  (STANDING):  atorvastatin 40 milliGRAM(s) Oral at bedtime  busPIRone 10 milliGRAM(s) Oral daily  gabapentin 300 milliGRAM(s) Oral two times a day  ALBUTerol/ipratropium for Nebulization 3 milliLiter(s) Nebulizer every 4 hours  pantoprazole  Injectable 40 milliGRAM(s) IV Push daily  chlorhexidine 0.12% Liquid 15 milliLiter(s) Swish and Spit two times a day  levETIRAcetam  Solution 1500 milliGRAM(s) Oral two times a day  OXcarbazepine Suspension 300 milliGRAM(s) Oral daily  OXcarbazepine Suspension 450 milliGRAM(s) Oral at bedtime  heparin  Injectable 5000 Unit(s) SubCutaneous every 8 hours  meropenem IVPB 1000 milliGRAM(s) IV Intermittent every 12 hours  insulin lispro (HumaLOG) corrective regimen sliding scale   SubCutaneous every 6 hours  dextrose 5%. 1000 milliLiter(s) (50 mL/Hr) IV Continuous <Continuous>  dextrose 50% Injectable 12.5 Gram(s) IV Push once  dextrose 50% Injectable 25 Gram(s) IV Push once  dextrose 50% Injectable 25 Gram(s) IV Push once  midodrine 5 milliGRAM(s) Oral every 8 hours  insulin glargine Injectable (LANTUS) 14 Unit(s) SubCutaneous at bedtime  metolazone 10 milliGRAM(s) Oral two times a day  hydrocortisone sodium succinate Injectable 25 milliGRAM(s) IV Push every 12 hours  haloperidol     Tablet 1 milliGRAM(s) Oral two times a day  QUEtiapine 75 milliGRAM(s) Oral every 8 hours    MEDICATIONS  (PRN):  dextrose Gel 1 Dose(s) Oral once PRN Blood Glucose LESS THAN 70 milliGRAM(s)/deciliter  glucagon  Injectable 1 milliGRAM(s) IntraMuscular once PRN Glucose LESS THAN 70 milligrams/deciliter      ALLERGIES:  Allergies    No Known Allergies    Intolerances        LABS:                        8.8    7.6   )-----------( 161      ( 22 Aug 2017 05:37 )             28.4     08-22    143  |  103  |  25<H>  ----------------------------<  121<H>  3.9   |  32<H>  |  0.60    Ca    9.1      22 Aug 2017 05:37  Phos  3.4     08-22  Mg     2.3     08-22    TPro  5.8<L>  /  Alb  2.6<L>  /  TBili  0.2  /  DBili  x   /  AST  25  /  ALT  38  /  AlkPhos  109  08-21      RADIOLOGY & ADDITIONAL TESTS: Reviewed. 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 PNA now s/p intubation for airway protection, admitted to MICU. Pt initially required NE for pressure support and insulin gtt for HHS on admission. Blood cx's +Klebsiella w/ urine cx's + for Klebsiella/E.Coli ESBL/Enterococcus and sputum cx's + for Pseudomonas/Klebsiella. Pt was also started on stress dose steroids on admission in the setting of recent steroid use and hypotension. Pt started on vanc and meropenem in the setting of hx ESBL, continued on meropenem after cx's returned but Vanc d/c'd. Pt began to improve clinically w/ improvement in hypotension and respiratory mechanics w/ continued abx treatment, NE weaned, and started on midodrine for pressure support. Pt with continued non-responsive mental status, however became more awake with eyes opening. Pt course also c/b fluid overload 2/2 aggressive fluid resuscitation in the setting of septic shock, started on metolazone 10mg BID w/ improvement in UOP and LE edema. Pt was extubated on 8/22, now tolerating NRB O2 supplementation. Family aware that if pt goes into respiratory distress again, pt will need to be intubated again w/ plan for tracheostomy as pt has been intubated multiple times. Stress dose steroids and midodrine are currently being weaned, w/ plans to continue weaning as BP tolerates. Plan to continue w/ meropenem to complete 7-10day course for likely PNA and UTI.     INTERVAL HPI/OVERNIGHT EVENTS: RAJEEV.     SUBJECTIVE: Patient seen and examined at bedside.    OBJECTIVE:  VITAL SIGNS:  ICU Vital Signs Last 24 Hrs  T(C): 36.6 (22 Aug 2017 01:00), Max: 37.1 (21 Aug 2017 07:05)  T(F): 97.9 (22 Aug 2017 01:00), Max: 98.7 (21 Aug 2017 07:05)  HR: 65 (22 Aug 2017 06:26) (48 - 80)  BP: 141/66 (22 Aug 2017 06:00) (78/42 - 141/66)  BP(mean): 93 (22 Aug 2017 06:00) (58 - 103)  RR: 17 (22 Aug 2017 06:26) (9 - 32)  SpO2: 99% (22 Aug 2017 06:26) (95% - 100%)    Mode: CPAP with PS  FiO2: 40  PEEP: 5  PS: 8  MAP: 7  PIP: 14    08-20 @ 07:01 - 08-21 @ 07:00  --------------------------------------------------------  IN: 2190 mL / OUT: 1855 mL / NET: 335 mL    08-21 @ 07:01 - 08-22 @ 06:50  --------------------------------------------------------  IN: 1430 mL / OUT: 1795 mL / NET: -365 mL      CAPILLARY BLOOD GLUCOSE  165 (21 Aug 2017 22:00)    PHYSICAL EXAM:  General: intubated, not on sedation, opens eyes but does not respond to commands or name, appears comfortable  HEENT: NC/AT  Respiratory: decreased BS b/l; diffuse rhonchi b/l  Cardiovascular: +S1/S2; no M/R/G heard  Abdomen: PEG in place, site appears clean and dry; soft, NT/ND; +BS   Extremities: significant contractures of UE, most notably on the left; WWP; no edema b/l LE  Neurological: AAOx0. Opens eyes, but not interactive. Does not respond to name or verbal stimuli. Withdraws from painful stimuli. Pupils sluggish but reactive. +gag reflex. +corneal reflex.    MEDICATIONS:  MEDICATIONS  (STANDING):  atorvastatin 40 milliGRAM(s) Oral at bedtime  busPIRone 10 milliGRAM(s) Oral daily  gabapentin 300 milliGRAM(s) Oral two times a day  ALBUTerol/ipratropium for Nebulization 3 milliLiter(s) Nebulizer every 4 hours  pantoprazole  Injectable 40 milliGRAM(s) IV Push daily  chlorhexidine 0.12% Liquid 15 milliLiter(s) Swish and Spit two times a day  levETIRAcetam  Solution 1500 milliGRAM(s) Oral two times a day  OXcarbazepine Suspension 300 milliGRAM(s) Oral daily  OXcarbazepine Suspension 450 milliGRAM(s) Oral at bedtime  heparin  Injectable 5000 Unit(s) SubCutaneous every 8 hours  meropenem IVPB 1000 milliGRAM(s) IV Intermittent every 12 hours  insulin lispro (HumaLOG) corrective regimen sliding scale   SubCutaneous every 6 hours  dextrose 5%. 1000 milliLiter(s) (50 mL/Hr) IV Continuous <Continuous>  dextrose 50% Injectable 12.5 Gram(s) IV Push once  dextrose 50% Injectable 25 Gram(s) IV Push once  dextrose 50% Injectable 25 Gram(s) IV Push once  midodrine 5 milliGRAM(s) Oral every 8 hours  insulin glargine Injectable (LANTUS) 14 Unit(s) SubCutaneous at bedtime  metolazone 10 milliGRAM(s) Oral two times a day  hydrocortisone sodium succinate Injectable 25 milliGRAM(s) IV Push every 12 hours  haloperidol     Tablet 1 milliGRAM(s) Oral two times a day  QUEtiapine 75 milliGRAM(s) Oral every 8 hours    MEDICATIONS  (PRN):  dextrose Gel 1 Dose(s) Oral once PRN Blood Glucose LESS THAN 70 milliGRAM(s)/deciliter  glucagon  Injectable 1 milliGRAM(s) IntraMuscular once PRN Glucose LESS THAN 70 milligrams/deciliter      ALLERGIES:  Allergies    No Known Allergies    Intolerances        LABS:                        8.8    7.6   )-----------( 161      ( 22 Aug 2017 05:37 )             28.4     08-22    143  |  103  |  25<H>  ----------------------------<  121<H>  3.9   |  32<H>  |  0.60    Ca    9.1      22 Aug 2017 05:37  Phos  3.4     08-22  Mg     2.3     08-22    TPro  5.8<L>  /  Alb  2.6<L>  /  TBili  0.2  /  DBili  x   /  AST  25  /  ALT  38  /  AlkPhos  109  08-21      RADIOLOGY & ADDITIONAL TESTS: Reviewed. 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 PNA now s/p intubation for airway protection, admitted to MICU. Pt initially required NE for pressure support and insulin gtt for HHS on admission. Blood cx's +Klebsiella w/ urine cx's + for Klebsiella/E.Coli ESBL/Enterococcus and sputum cx's + for Pseudomonas/Klebsiella. HHS resolved, insulin gtt discontinued, and pt maintained on SSI/Lantus 14u at qHS. Pt was also started on stress dose steroids on admission in the setting of recent steroid use and hypotension. Pt started on vanc and meropenem in the setting of hx ESBL, continued on meropenem after cx's returned but Vanc d/c'd. Pt began to improve clinically w/ improvement in hypotension and respiratory mechanics w/ continued abx treatment, NE weaned, and started on midodrine for pressure support. Pt with continued non-responsive mental status, however became more awake with eyes opening. Pt course also c/b fluid overload 2/2 aggressive fluid resuscitation in the setting of septic shock, started on metolazone 10mg BID w/ improvement in UOP and LE edema. Pt was extubated on 8/22, now tolerating NRB O2 supplementation. Family aware that if pt goes into respiratory distress again, pt will need to be intubated again w/ plan for tracheostomy as pt has been intubated multiple times. Stress dose steroids and midodrine are currently being weaned, w/ plans to continue weaning as BP tolerates. Plan to continue w/ meropenem to complete 7-10day course for likely PNA and UTI.     INTERVAL HPI/OVERNIGHT EVENTS: RAJEEV.     SUBJECTIVE: Patient seen and examined at bedside.    OBJECTIVE:  VITAL SIGNS:  ICU Vital Signs Last 24 Hrs  T(C): 36.6 (22 Aug 2017 01:00), Max: 37.1 (21 Aug 2017 07:05)  T(F): 97.9 (22 Aug 2017 01:00), Max: 98.7 (21 Aug 2017 07:05)  HR: 65 (22 Aug 2017 06:26) (48 - 80)  BP: 141/66 (22 Aug 2017 06:00) (78/42 - 141/66)  BP(mean): 93 (22 Aug 2017 06:00) (58 - 103)  RR: 17 (22 Aug 2017 06:26) (9 - 32)  SpO2: 99% (22 Aug 2017 06:26) (95% - 100%)    Mode: CPAP with PS  FiO2: 40  PEEP: 5  PS: 8  MAP: 7  PIP: 14    08-20 @ 07:01 - 08-21 @ 07:00  --------------------------------------------------------  IN: 2190 mL / OUT: 1855 mL / NET: 335 mL    08-21 @ 07:01 - 08-22 @ 06:50  --------------------------------------------------------  IN: 1430 mL / OUT: 1795 mL / NET: -365 mL      CAPILLARY BLOOD GLUCOSE  165 (21 Aug 2017 22:00)    PHYSICAL EXAM:  General: intubated, not on sedation, opens eyes but does not respond to commands or name, appears comfortable  HEENT: NC/AT  Respiratory: decreased BS b/l; diffuse rhonchi b/l  Cardiovascular: +S1/S2; no M/R/G heard  Abdomen: PEG in place, site appears clean and dry; soft, NT/ND; +BS   Extremities: significant contractures of UE, most notably on the left; WWP; no edema b/l LE  Neurological: AAOx0. Opens eyes, but not interactive. Does not respond to name or verbal stimuli. Withdraws from painful stimuli. Pupils sluggish but reactive. +gag reflex. +corneal reflex.    MEDICATIONS:  MEDICATIONS  (STANDING):  atorvastatin 40 milliGRAM(s) Oral at bedtime  busPIRone 10 milliGRAM(s) Oral daily  gabapentin 300 milliGRAM(s) Oral two times a day  ALBUTerol/ipratropium for Nebulization 3 milliLiter(s) Nebulizer every 4 hours  pantoprazole  Injectable 40 milliGRAM(s) IV Push daily  chlorhexidine 0.12% Liquid 15 milliLiter(s) Swish and Spit two times a day  levETIRAcetam  Solution 1500 milliGRAM(s) Oral two times a day  OXcarbazepine Suspension 300 milliGRAM(s) Oral daily  OXcarbazepine Suspension 450 milliGRAM(s) Oral at bedtime  heparin  Injectable 5000 Unit(s) SubCutaneous every 8 hours  meropenem IVPB 1000 milliGRAM(s) IV Intermittent every 12 hours  insulin lispro (HumaLOG) corrective regimen sliding scale   SubCutaneous every 6 hours  dextrose 5%. 1000 milliLiter(s) (50 mL/Hr) IV Continuous <Continuous>  dextrose 50% Injectable 12.5 Gram(s) IV Push once  dextrose 50% Injectable 25 Gram(s) IV Push once  dextrose 50% Injectable 25 Gram(s) IV Push once  midodrine 5 milliGRAM(s) Oral every 8 hours  insulin glargine Injectable (LANTUS) 14 Unit(s) SubCutaneous at bedtime  metolazone 10 milliGRAM(s) Oral two times a day  hydrocortisone sodium succinate Injectable 25 milliGRAM(s) IV Push every 12 hours  haloperidol     Tablet 1 milliGRAM(s) Oral two times a day  QUEtiapine 75 milliGRAM(s) Oral every 8 hours    MEDICATIONS  (PRN):  dextrose Gel 1 Dose(s) Oral once PRN Blood Glucose LESS THAN 70 milliGRAM(s)/deciliter  glucagon  Injectable 1 milliGRAM(s) IntraMuscular once PRN Glucose LESS THAN 70 milligrams/deciliter      ALLERGIES:  Allergies    No Known Allergies    Intolerances        LABS:                        8.8    7.6   )-----------( 161      ( 22 Aug 2017 05:37 )             28.4     08-22    143  |  103  |  25<H>  ----------------------------<  121<H>  3.9   |  32<H>  |  0.60    Ca    9.1      22 Aug 2017 05:37  Phos  3.4     08-22  Mg     2.3     08-22    TPro  5.8<L>  /  Alb  2.6<L>  /  TBili  0.2  /  DBili  x   /  AST  25  /  ALT  38  /  AlkPhos  109  08-21      RADIOLOGY & ADDITIONAL TESTS: Reviewed. 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 PNA now s/p intubation for airway protection, admitted to MICU. Pt initially required NE for pressure support and insulin gtt for HHS on admission. Blood cx's +Klebsiella w/ urine cx's + for Klebsiella/E.Coli ESBL/Enterococcus and sputum cx's + for Pseudomonas/E.coli. HHS resolved, insulin gtt discontinued, and pt maintained on SSI/Lantus 14u at qHS. Pt was also started on stress dose steroids on admission in the setting of recent steroid use and hypotension. Pt started on vanc and meropenem in the setting of hx ESBL, continued on meropenem after cx's returned but Vanc d/c'd. Pt began to improve clinically w/ improvement in hypotension and respiratory mechanics w/ continued abx treatment, NE weaned, and started on midodrine for pressure support. Pt with continued non-responsive mental status, however became more awake with eyes opening. Pt course also c/b fluid overload 2/2 aggressive fluid resuscitation in the setting of septic shock, started on metolazone 10mg BID w/ improvement in UOP and LE edema. Pt was extubated on 8/22, now tolerating NRB O2 supplementation. Family aware that if pt goes into respiratory distress again, pt will need to be intubated again w/ plan for tracheostomy as pt has been intubated multiple times. Stress dose steroids and midodrine are currently being weaned, w/ plans to continue weaning as BP tolerates. Plan to continue w/ meropenem to complete 7-10day course for likely PNA and UTI.     INTERVAL HPI/OVERNIGHT EVENTS: RAJEEV.     SUBJECTIVE: Patient seen and examined at bedside.    OBJECTIVE:  VITAL SIGNS:  ICU Vital Signs Last 24 Hrs  T(C): 36.6 (22 Aug 2017 01:00), Max: 37.1 (21 Aug 2017 07:05)  T(F): 97.9 (22 Aug 2017 01:00), Max: 98.7 (21 Aug 2017 07:05)  HR: 65 (22 Aug 2017 06:26) (48 - 80)  BP: 141/66 (22 Aug 2017 06:00) (78/42 - 141/66)  BP(mean): 93 (22 Aug 2017 06:00) (58 - 103)  RR: 17 (22 Aug 2017 06:26) (9 - 32)  SpO2: 99% (22 Aug 2017 06:26) (95% - 100%)    Mode: CPAP with PS  FiO2: 40  PEEP: 5  PS: 8  MAP: 7  PIP: 14    08-20 @ 07:01 - 08-21 @ 07:00  --------------------------------------------------------  IN: 2190 mL / OUT: 1855 mL / NET: 335 mL    08-21 @ 07:01 - 08-22 @ 06:50  --------------------------------------------------------  IN: 1430 mL / OUT: 1795 mL / NET: -365 mL      CAPILLARY BLOOD GLUCOSE  165 (21 Aug 2017 22:00)    PHYSICAL EXAM:  General: intubated, not on sedation, opens eyes but does not respond to commands or name, appears comfortable  HEENT: NC/AT  Respiratory: decreased BS b/l; diffuse rhonchi b/l  Cardiovascular: +S1/S2; no M/R/G heard  Abdomen: PEG in place, site appears clean and dry; soft, NT/ND; +BS   Extremities: significant contractures of UE, most notably on the left; WWP; no edema b/l LE  Neurological: AAOx0. Opens eyes, but not interactive. Does not respond to name or verbal stimuli. Withdraws from painful stimuli. Pupils sluggish but reactive. +gag reflex. +corneal reflex.    MEDICATIONS:  MEDICATIONS  (STANDING):  atorvastatin 40 milliGRAM(s) Oral at bedtime  busPIRone 10 milliGRAM(s) Oral daily  gabapentin 300 milliGRAM(s) Oral two times a day  ALBUTerol/ipratropium for Nebulization 3 milliLiter(s) Nebulizer every 4 hours  pantoprazole  Injectable 40 milliGRAM(s) IV Push daily  chlorhexidine 0.12% Liquid 15 milliLiter(s) Swish and Spit two times a day  levETIRAcetam  Solution 1500 milliGRAM(s) Oral two times a day  OXcarbazepine Suspension 300 milliGRAM(s) Oral daily  OXcarbazepine Suspension 450 milliGRAM(s) Oral at bedtime  heparin  Injectable 5000 Unit(s) SubCutaneous every 8 hours  meropenem IVPB 1000 milliGRAM(s) IV Intermittent every 12 hours  insulin lispro (HumaLOG) corrective regimen sliding scale   SubCutaneous every 6 hours  dextrose 5%. 1000 milliLiter(s) (50 mL/Hr) IV Continuous <Continuous>  dextrose 50% Injectable 12.5 Gram(s) IV Push once  dextrose 50% Injectable 25 Gram(s) IV Push once  dextrose 50% Injectable 25 Gram(s) IV Push once  midodrine 5 milliGRAM(s) Oral every 8 hours  insulin glargine Injectable (LANTUS) 14 Unit(s) SubCutaneous at bedtime  metolazone 10 milliGRAM(s) Oral two times a day  hydrocortisone sodium succinate Injectable 25 milliGRAM(s) IV Push every 12 hours  haloperidol     Tablet 1 milliGRAM(s) Oral two times a day  QUEtiapine 75 milliGRAM(s) Oral every 8 hours    MEDICATIONS  (PRN):  dextrose Gel 1 Dose(s) Oral once PRN Blood Glucose LESS THAN 70 milliGRAM(s)/deciliter  glucagon  Injectable 1 milliGRAM(s) IntraMuscular once PRN Glucose LESS THAN 70 milligrams/deciliter      ALLERGIES:  Allergies    No Known Allergies    Intolerances        LABS:                        8.8    7.6   )-----------( 161      ( 22 Aug 2017 05:37 )             28.4     08-22    143  |  103  |  25<H>  ----------------------------<  121<H>  3.9   |  32<H>  |  0.60    Ca    9.1      22 Aug 2017 05:37  Phos  3.4     08-22  Mg     2.3     08-22    TPro  5.8<L>  /  Alb  2.6<L>  /  TBili  0.2  /  DBili  x   /  AST  25  /  ALT  38  /  AlkPhos  109  08-21      RADIOLOGY & ADDITIONAL TESTS: Reviewed.

## 2017-08-22 NOTE — DISCHARGE NOTE ADULT - CARE PROVIDER_API CALL
Hang Melara), Internal Medicine; Nephrology  [address], [place] [address]  Phone: [phone]  Fax: [phone]

## 2017-08-22 NOTE — DISCHARGE NOTE ADULT - SECONDARY DIAGNOSIS.
Urinary tract infection without hematuria, site unspecified Acute respiratory failure with hypoxia Hyperosmolar non-ketotic state in patient with type 2 diabetes mellitus

## 2017-08-22 NOTE — DISCHARGE NOTE ADULT - [id number]_GEN_ALL_CORE
Stroke/TBI (neurological/cognitive impairment)/Fall risk/Shortness of breath with minimal ambulation

## 2017-08-22 NOTE — DISCHARGE NOTE ADULT - HOSPITAL COURSE
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 PNA now s/p intubation for airway protection, admitted to MICU. Pt initially required NE for pressure support and insulin gtt for HHS on admission. Blood cx's +Klebsiella w/ urine cx's + for Klebsiella/E.Coli ESBL/Enterococcus and sputum cx's + for Pseudomonas/Klebsiella. Pt was also started on stress dose steroids on admission in the setting of recent steroid use and hypotension. Pt started on vanc and meropenem in the setting of hx ESBL, continued on meropenem after cx's returned but Vanc d/c'd. Pt began to improve clinically w/ improvement in hypotension and respiratory mechanics w/ continued abx treatment, NE weaned, and started on midodrine for pressure support. Pt with continued non-responsive mental status, however became more awake with eyes opening. Pt course also c/b fluid overload 2/2 aggressive fluid resuscitation in the setting of septic shock, started on metolazone 10mg BID w/ improvement in UOP and LE edema. Pt was extubated on 8/22, now tolerating NRB O2 supplementation. Family aware that if pt goes into respiratory distress again, pt will need to be intubated again w/ plan for tracheostomy as pt has been intubated multiple times. 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 PNA now s/p intubation for airway protection, admitted to MICU. Pt initially required NE for pressure support and insulin gtt for HHS on admission. Blood cx's +Klebsiella w/ urine cx's + for Klebsiella/E.Coli ESBL/Enterococcus and sputum cx's + for Pseudomonas/Klebsiella. HHS resolved, insulin gtt discontinued, and pt maintained on SSI/Lantus 14u at qHS. Pt was also started on stress dose steroids on admission in the setting of recent steroid use and hypotension. Pt started on vanc and meropenem in the setting of hx ESBL, continued on meropenem after cx's returned but Vanc d/c'd. Pt began to improve clinically w/ improvement in hypotension and respiratory mechanics w/ continued abx treatment, NE weaned, and started on midodrine for pressure support. Pt with continued non-responsive mental status, however became more awake with eyes opening. Pt course also c/b fluid overload 2/2 aggressive fluid resuscitation in the setting of septic shock, started on metolazone 10mg BID w/ improvement in UOP and LE edema. Pt was extubated on 8/22, now tolerating NRB O2 supplementation. Family aware that if pt goes into respiratory distress again, pt will need to be intubated again w/ plan for tracheostomy as pt has been intubated multiple times. 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 PNA now s/p intubation for airway protection, admitted to MICU. Pt initially required NE for pressure support and insulin gtt for HHS on admission. Blood cx's +Klebsiella w/ urine cx's + for Klebsiella/E.Coli ESBL/Enterococcus and sputum cx's + for Pseudomonas/E. coli. HHS resolved, insulin gtt discontinued, and pt maintained on SSI/Lantus 14u at qHS. Pt was also started on stress dose steroids on admission in the setting of recent steroid use and hypotension. Pt started on vanc and meropenem in the setting of hx ESBL, continued on meropenem after cx's returned but Vanc d/c'd. Pt began to improve clinically w/ improvement in hypotension and respiratory mechanics w/ continued abx treatment, NE weaned, and started on midodrine for pressure support. Pt with continued non-responsive mental status, however became more awake with eyes opening. Pt course also c/b fluid overload 2/2 aggressive fluid resuscitation in the setting of septic shock, started on metolazone 10mg BID w/ improvement in UOP and LE edema. Pt was extubated on 8/22, now tolerating NRB O2 supplementation. Family aware that if pt goes into respiratory distress again, pt will need to be intubated again w/ plan for tracheostomy as pt has been intubated multiple times. 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 PNA now s/p intubation for airway protection, admitted to MICU. Pt initially required NE for pressure support and insulin gtt for HHS on admission. Blood cx's +Klebsiella w/ urine cx's + for Klebsiella/E.Coli ESBL/Enterococcus and sputum cx's + for Pseudomonas/E. coli. HHS resolved, insulin gtt discontinued, and pt maintained on SSI/Lantus 14u at qHS. Pt was also started on stress dose steroids on admission in the setting of recent steroid use and hypotension. Pt started on vanc and meropenem in the setting of hx ESBL, continued on meropenem after cx's returned but Vanc d/c'd. Pt began to improve clinically w/ improvement in hypotension and respiratory mechanics w/ continued abx treatment, NE weaned, and started on midodrine for pressure support. Pt with continued non-responsive mental status, however became more awake with eyes opening. Pt course also c/b fluid overload 2/2 aggressive fluid resuscitation in the setting of septic shock, started on metolazone 10mg BID w/ improvement in UOP and LE edema. Pt was extubated on 8/22, and put on NRB O2 supplementation. Family was made aware that if pt goes into respiratory distress again, pt will need to be intubated again w/ plan for tracheostomy as pt has been intubated multiple times.  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 PNA now s/p intubation for airway protection, admitted to MICU. Pt initially required NE for pressure support and insulin gtt for HHS on admission. Blood cx's +Klebsiella w/ urine cx's + for Klebsiella/E.Coli ESBL/Enterococcus and sputum cx's + for Pseudomonas/E. coli. HHS resolved, insulin gtt discontinued, and pt maintained on SSI/Lantus 14u at qHS. Pt was also started on stress dose steroids on admission in the setting of recent steroid use and hypotension. Pt started on vanc and meropenem in the setting of hx ESBL, continued on meropenem after cx's returned but Vanc d/c'd. Pt began to improve clinically w/ improvement in hypotension and respiratory mechanics w/ continued abx treatment, NE weaned, and started on midodrine for pressure support. Pt with continued non-responsive mental status, however became more awake with eyes opening. Pt course also c/b fluid overload 2/2 aggressive fluid resuscitation in the setting of septic shock, started on metolazone 10mg BID w/ improvement in UOP and LE edema. Pt was extubated on 8/22, and put on NRB O2 supplementation. Family was made aware that if pt goes into respiratory distress again, pt will need to be intubated again w/ plan for tracheostomy as pt has been intubated multiple times.  After extubation pt responds to verbal commands only intermittently and is being discharged as per family's persistence. 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 PNA now s/p intubation for airway protection, admitted to MICU. Pt initially required NE for pressure support and insulin gtt for HHS on admission. Blood cx's +Klebsiella w/ urine cx's + for Klebsiella/E.Coli ESBL/Enterococcus and sputum cx's + for Pseudomonas/E. coli. HHS resolved, insulin gtt discontinued, and pt maintained on SSI/Lantus 14u at qHS. Pt was also started on stress dose steroids on admission in the setting of recent steroid use and hypotension. Pt started on vanc and meropenem in the setting of hx ESBL, continued on meropenem after cx's returned but Vanc d/c'd. Pt began to improve clinically w/ improvement in hypotension and respiratory mechanics w/ continued abx treatment, NE weaned, and started on midodrine for pressure support. Pt with continued non-responsive mental status, however became more awake with eyes opening. Pt course also c/b fluid overload 2/2 aggressive fluid resuscitation in the setting of septic shock, started on metolazone 10mg BID w/ improvement in UOP and LE edema. Pt was extubated on 8/22, and put on 4lt NC. Family was made aware that if pt goes into respiratory distress again, pt will need to be intubated again w/ plan for tracheostomy as pt has been intubated multiple times.  After extubation pt responds to verbal commands only intermittently and is being discharged on 4lt oxygen NC and tube feeds with Jevity 3-4 cans per day (1/2 can every 4 hours).

## 2017-08-22 NOTE — PROGRESS NOTE ADULT - ASSESSMENT
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 PNA now s/p intubation for airway protection, admitted to MICU for monitoring.     NEURO  #AMS   -Likely 2/2 sepsis superimposed on baseline AD. Family reports that at baseline patient is alert and responsive.  CT not performed because of similar episodes that were due to infection.  -c/w abx (meropenem) to treat sepsis and achieve source control, as noted below  -decrease Seroquel to 75 q8h (from 150 q8) and Haldol to 1mg BID (from 2mg BID), as pt is off all sedation and still lethargic after Seroquel doses. Will need to reassess with decreased Seroquel for increased alertness, responsiveness as pt currently not a candidate for extubation even though tolerating CPAP 2/2 inability to protect airway w/ current level of responsiveness  -possibility of need for trach d/w family over the weekend, family not amenable to trach at this time; present at bedside this AM and agree w/ plan to decrease Seroquel/Haldol and see if pt's mental status improves for trial of extubation  #Seizure  -Pt does not appear to be having clinical seizures at this time, likely not the etiology of AMS  -c/w home Keppra  -c/w home Tripleptal    CV  #Septic Shock 2/2 UTI vs. PNA   -Pt hypotensive w/ elevated lactate and likely source of infection urinary tract vs. PNA (hx aspiration pneumonitis)  -blood cx's 8/16 growing Klebsiella ESBL sensitive to penems  -Urine cx's growing Klebsiella ESBL/E.Coli/Enterococcus, likely the source of bacteremia; Klebsiella/E.Coli sensitive to penems however meropenem not covering Enterococcus; however, pt has clinically improved w/ meropenem tx so will hold off on additional abx at this time  -Sputum cx now growing Pseudomonas and E.Coli ESBL as well; E.Coli sensitive to penem, however not currently covering pseudomonas. Pt continues to remain clinically improved w/ meropenem tx, so will hold off on adding additional anti-pseudomonal agent at this time, may just be colonization rather that clinical infectious picture  -repeat sputum cx rejected 2/2 contamination - will send repeat  -repeat blood cx's drawn 8/17 negative to date, will continue to follow  -if pt spikes fever, will re-cx and do full fever   -c/w meropenem 1g q12hrs (currently day #5)   -pt no longer requiring NE, will c/w midodrine 5 q8 (decreased from 10)  #Hypotension  - Likely 2/2 septic shock, currently off Levo and pressures have been maintained w/ midodrine  - continue to tx underlying infection  - c/w steroids at dose 25mg hydrocortisone q12 and taper as tolerated   - no longer on NE, however pt continues on midodrine 5mg PO (PEG) q8hrs for pressure support  - Fluid overloaded 2/2 IVF - c/w metolazone 10mg BID and monitor UOP and pressures in the setting of diuresis    RESPIRATORY  #Acute on chronic hypercapnic respiratory failure s/p intubation  -Repeat VBG shows improvement of hypercapnia and oxygenation.    -continue on ventilator until mental status improves, currently tolerating trial of CPAP, but not candidate for trial of extubation until mental status improves enough for pt to protect airway    GI  #Constipation/Diarrhea  -monitor for BM's     ID  #Klebsiella bacteremia  -Pt w/ hx of aspiration pneumonitis and ESBL UTI. CXR shows pleural effusions but no definitive infiltrate.  -Blood: Klebsiella sensitive to penems; Urine: Klebsiella ESBL sensitive to penem/E.Coli ESBL sensitive to penem/Enterococcus; Sputum: E.Coli ESBl sensitive to penem/Pseudomonas sensitive to Cipro/gent/Zosyn  -c/w meropenem, as above    ENDO  #HHS (resolved)  -2/2 steroid use, blood glucose of 685 on admission.  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 PNA initially intubated for airway protection, admitted to MICU for monitoring, now s/p extubation.    NEURO  #AMS   -Likely 2/2 sepsis superimposed on baseline AD. Family reports that at baseline patient is alert and responsive.  CT not performed because of similar episodes that were due to infection.  -c/w abx (meropenem) to treat sepsis and achieve source control, as noted below  -c/w Seroquel to 75 q8h (down from home dose 150 q8) and Haldol to 1mg BID (down from 2mg BID) - family refused Haldol today  -Pt remains non-responsive to commands and minimally arousable on lower doses, however unable to continue to titrate downward on sedative medications 2/2 continued agitation leading to tachycardia/tachypnea  #Seizure  -Pt does not appear to be having clinical seizures at this time, likely not the etiology of AMS  -c/w home Keppra  -c/w home Tripleptal    CV  #Septic Shock 2/2 UTI vs. PNA   -Pt hypotensive w/ elevated lactate and likely source of infection urinary tract vs. PNA (hx aspiration pneumonitis)  -blood cx's 8/16 growing Klebsiella ESBL sensitive to penems  -Urine cx's growing Klebsiella ESBL/E.Coli/Enterococcus, likely the source of bacteremia; Klebsiella/E.Coli sensitive to penems however meropenem not covering Enterococcus; however, pt has clinically improved w/ meropenem tx so will hold off on additional abx at this time  -Sputum cx now growing Pseudomonas and E.Coli ESBL as well; E.Coli sensitive to penem, however not currently covering pseudomonas. Pt continues to remain clinically improved w/ meropenem tx, so will hold off on adding additional anti-pseudomonal agent at this time, may just be colonization rather that clinical infectious picture  -repeat sputum cx rejected 2/2 contamination - will send repeat  -repeat blood cx's drawn 8/17 negative to date, will continue to follow  -if pt spikes fever, will re-cx and do full fever   -c/w meropenem 1g q12hrs (currently day #5)   -pt no longer requiring NE, currently weaning midodrine 5 q8 to q12   #Hypotension  - Likely 2/2 septic shock, currently off Levo and pressures have been maintained w/ midodrine  - continue to tx underlying infection  - c/w steroids at dose 25mg hydrocortisone q12 and taper as tolerated   - no longer on NE, however pt continues on midodrine 5mg PO (PEG) q8hrs for pressure support  - Fluid overloaded 2/2 IVF - c/w metolazone 10mg BID and monitor UOP and pressures in the setting of diuresis    RESPIRATORY  #Acute on chronic hypercapnic respiratory failure s/p intubation  -Repeat VBG shows improvement of hypercapnia and oxygenation.    -continue on ventilator until mental status improves, currently tolerating trial of CPAP, but not candidate for trial of extubation until mental status improves enough for pt to protect airway    GI  #Constipation/Diarrhea  -monitor for BM's     ID  #Klebsiella bacteremia  -Pt w/ hx of aspiration pneumonitis and ESBL UTI. CXR shows pleural effusions but no definitive infiltrate.  -Blood: Klebsiella sensitive to penems; Urine: Klebsiella ESBL sensitive to penem/E.Coli ESBL sensitive to penem/Enterococcus; Sputum: E.Coli ESBl sensitive to penem/Pseudomonas sensitive to Cipro/gent/Zosyn  -c/w meropenem, as above    ENDO  #HHS (resolved)  -2/2 steroid use, blood glucose of 685 on admission.  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 PNA initially intubated for airway protection, admitted to MICU for monitoring, now s/p extubation on 8/22.    NEURO  #AMS   -Likely 2/2 sepsis superimposed on baseline AD. Family reports that at baseline patient is alert and responsive.  CT not performed because of similar episodes that were due to infection.  -c/w abx (meropenem) to treat sepsis and achieve source control, as noted below  -c/w Seroquel to 75 q8h (down from home dose 150 q8) and Haldol to 1mg BID (down from 2mg BID) - family refused Haldol today  -Pt remains non-responsive to commands and minimally arousable on lower doses, however unable to continue to titrate downward on sedative medications 2/2 continued agitation leading to tachycardia/tachypnea  -Mental status is the limiting factor to pt's extubation, family aware of risks associated w/ extubation with current mental status and concern for inability to protect airway. Discussion w/ family at bedside by Dr. Yuen - family wants to attempt extubation w/ understanding that if he does not tolerate extubation pt will have to be reintubated and sent for trach (likely by ENT as pt contractures limit extension of neck for percutaneous at bedside).   #Seizure  -Pt does not appear to be having clinical seizures at this time, likely not the etiology of AMS  -c/w home Keppra  -c/w home Tripleptal    CV  #Septic Shock 2/2 UTI vs. PNA   -Pt hypotensive w/ elevated lactate and likely source of infection urinary tract vs. PNA (hx aspiration pneumonitis)  -blood cx's 8/16 growing Klebsiella ESBL sensitive to penems  -Urine cx's growing Klebsiella ESBL/E.Coli/Enterococcus, likely the source of bacteremia; Klebsiella/E.Coli sensitive to penems however meropenem not covering Enterococcus; however, pt has clinically improved w/ meropenem tx so will hold off on additional abx at this time  -Sputum cx now growing Pseudomonas and E.Coli ESBL as well; E.Coli sensitive to penem, however not currently covering pseudomonas. Pt continues to remain clinically improved w/ meropenem tx, so will hold off on adding additional anti-pseudomonal agent at this time, may just be colonization rather that clinical infectious picture  -repeat sputum cx rejected 2/2 contamination - will send repeat  -repeat blood cx's drawn 8/17 negative to date, will continue to follow  -if pt spikes fever, will re-cx and do full fever   -c/w meropenem 1g q12hrs (currently day #6), to complete 7-10d. course  -pt no longer requiring NE, currently weaned midodrine 5 q8 to 5 q12 today; continue w/ weaning as BP allows  #Hypotension  - Likely 2/2 septic shock, currently off Levo and pressures have been maintained w/ midodrine and so far have tolerated weaning  - continue to tx underlying infection  - c/w tapering stress dose steroids, decreased from 25 q12 to 25 q24 today  -pt no longer requiring NE, currently weaned midodrine 5 q8 to 5 q12 today; continue w/ weaning as BP allows  - Fluid overloaded 2/2 IVF - c/w metolazone 10mg BID and monitor UOP and pressures in the setting of diuresis    RESPIRATORY  #Acute on chronic hypercapnic respiratory failure s/p intubation  -Repeat VBG shows improvement of hypercapnia and oxygenation.    -pt extubated this afternoon, currently tolerating extubation so far on aerosol mask at FiO2 50% at 10LPM  -Discussion w/ family at bedside by Dr. Yuen - family w/ understanding that if he does not tolerate extubation pt will have to be reintubated and sent for trach (likely by ENT as pt contractures limit extension of neck for percutaneous at bedside)    GI  #Constipation/Diarrhea  -monitor for BM's     ID  #Klebsiella bacteremia  -Pt w/ hx of aspiration pneumonitis and ESBL UTI. CXR shows pleural effusions but no definitive infiltrate.  -Blood: Klebsiella sensitive to penems; Urine: Klebsiella ESBL sensitive to penem/E.Coli ESBL sensitive to penem/Enterococcus; Sputum: E.Coli ESBl sensitive to penem/Pseudomonas sensitive to Cipro/gent/Zosyn  -c/w meropenem, as above    ENDO  #HHS (resolved)  -2/2 steroid use, blood glucose of 685 on admission.  -insulin gtt d/c'd as glucose downtrended and IVF d/c'd in the setting of volume overload  -transitioned to FSG/SSI + Lantus 14u at bedtime    FEN  #Fluids: none  #Monitor, Replete to K>4 and Mg>2  #Diet: Glucerna 1.2 w/ goal rate 60/hr    PPX  -SQH 5000 u q8h  -Protonix 40 IV qd    Code Status: FULL CODE

## 2017-08-22 NOTE — DISCHARGE NOTE ADULT - MEDICATION SUMMARY - MEDICATIONS TO TAKE
I will START or STAY ON the medications listed below when I get home from the hospital:    predniSONE 20 mg oral tablet  -- 1 tab(s) by mouth once a day  -- Indication: For Stress steroid dose    budesonide 3 mg oral capsule, extended release  -- 2 cap(s) by mouth once a day  -- Indication: For ACUTE RESPIRATORYFAILURE    losartan 50 mg oral tablet  -- 1 tab(s) by mouth once a day  -- Indication: For Hypertension    levETIRAcetam 100 mg/mL oral solution  -- 1.25 milliliter(s) by mouth 2 times a day  -- Indication: For Seizure    OXcarbazepine 300 mg oral tablet  -- 1 tab(s) by mouth 2 times a day  -- Indication: For Seizure     Trileptal 150 mg oral tablet  -- 1 tab(s) by mouth once a day (at bedtime)  -- Indication: For Seizure    Trileptal 300 mg oral tablet  -- 1 tab(s) by mouth 2 times a day  -- Indication: For Seizure    Keppra 250 mg oral tablet  -- 1 tab(s) by mouth 2 times a day  -- Indication: For Seizure    gabapentin 300 mg oral capsule  -- 300 milligram(s) by mouth 2 times a day  -- Indication: For neuropathic pain    CeleXA 20 mg oral tablet  -- 1 tab(s) by mouth once a day  -- Indication: For depression    Lipitor 40 mg oral tablet  -- 1 tab(s) by mouth once a day (at bedtime)  -- Indication: For Hyperlipedemia    haloperidol 2 mg oral tablet  -- 1 tab(s) by mouth 2 times a day  -- Indication: For Agitation    SEROquel  -- 2 tab(s) by gastrostomy tube once a day (at bedtime)  -- Indication: For Agitation    busPIRone 10 mg oral tablet  -- 10 milligram(s) by mouth once a day  -- Indication: For Anxiety    levalbuterol 0.31 mg/3 mL inhalation solution  -- 3 milliliter(s) inhaled 3 times a day  -- Indication: For ACUTE RESPIRATORYFAILURE    ipratropium 500 mcg/2.5 mL inhalation solution  -- 2.5 milliliter(s) inhaled 3 times a day  -- Indication: For Acute respiratory failure with hypoxia    Lasix 20 mg oral tablet  -- 20 milligram(s) by mouth 2 times a day  -- Indication: For Hypertension    sulfamethoxazole-trimethoprim 200 mg-40 mg/5 mL oral suspension  -- 20 milliliter(s) by mouth 2 times a day  -- Indication: For Sepsis    Florastor 250 mg oral capsule  -- 1 cap(s) by mouth once a day  -- Indication: For prophylaxis    pantoprazole  -- 40 milligram(s) enteral 2 times a day  -- Indication: For prophylaxis

## 2017-08-22 NOTE — DISCHARGE NOTE ADULT - CARE PLAN
Principal Discharge DX:	Sepsis  Goal:	To treat and contain the source of infection.  Instructions for follow-up, activity and diet:	You had shortness of breath, confusion on admission with a low blood pressure. This was found to be due to  infection in the urine (Urinary tract infection) and as well as in the lungs( Pneumonia) which was confirmed with urine, sputum and blood cultures which grew bacteria. You were treated with intravenous antibiotics (vancomycin and meropenam) in the hospital. You are being discharged to home with respiratory support  an oral antibiotic Bactrum.  Secondary Diagnosis:	Urinary tract infection without hematuria, site unspecified  Instructions for follow-up, activity and diet:	Your confusion and shortness of breath was most likely in the setting of sepsis secondary to infection of the urinary tract which was confirmed with urine cultures. You were treated with iv antibiotics (meropenam) and now being discharged on oral antibiotics (bactrum).  Secondary Diagnosis:	Acute respiratory failure with hypoxia  Secondary Diagnosis:	Hyperosmolar non-ketotic state in patient with type 2 diabetes mellitus Principal Discharge DX:	Sepsis  Goal:	To treat and contain the source of infection.  Instructions for follow-up, activity and diet:	You had shortness of breath, confusion on admission with a low blood pressure. This was found to be due to  infection in the urine (Urinary tract infection) and as well as in the lungs( Pneumonia) which was confirmed with urine, sputum and blood cultures which grew bacteria. You were treated with intravenous antibiotics (vancomycin and meropenam) in the hospital. You are being discharged to home with respiratory support  an oral antibiotic Bactrum.  Secondary Diagnosis:	Urinary tract infection without hematuria, site unspecified  Instructions for follow-up, activity and diet:	Your confusion and shortness of breath was most likely in the setting of sepsis secondary to infection of the urinary tract which was confirmed with urine cultures. You were treated with iv antibiotics (meropenam) and now being discharged on oral antibiotics (bactrum).  Secondary Diagnosis:	Acute respiratory failure with hypoxia  Instructions for follow-up, activity and diet:	You has shortness of breath on admission with low levels of oxygen and high carbon dioxide. You were put on ventilator for respiratory support and your respiratory condition was most likely due to underlying lung infection, as evident on sputum and blood cultures. You were extubated after family's persistence and thereafter put on aerosol mask, extensive discussion was held with family regarding possible adverse consequences( acute respiratory failure) after extubation. You are being discharged currently to home with respiratory therapist as per family's wishes.  Secondary Diagnosis:	Hyperosmolar non-ketotic state in patient with type 2 diabetes mellitus  Instructions for follow-up, activity and diet:	You had high sugars on admission (685) and were started on insulin drip following which this was resolved and the drip was stopped. Principal Discharge DX:	Sepsis  Goal:	To treat and contain the source of infection.  Instructions for follow-up, activity and diet:	You had shortness of breath, confusion on admission with a low blood pressure. This was found to be due to  infection in the urine (Urinary tract infection) and as well as in the lungs( Pneumonia) which was confirmed with urine, sputum and blood cultures which grew bacteria. You were treated with intravenous antibiotics (vancomycin and meropenam) in the hospital. You are being discharged to home with respiratory support  an oral antibiotic Bactrum.  Secondary Diagnosis:	Urinary tract infection without hematuria, site unspecified  Instructions for follow-up, activity and diet:	Your confusion and shortness of breath was most likely in the setting of sepsis secondary to infection of the urinary tract which was confirmed with urine cultures. You were treated with iv antibiotics (meropenam) and now being discharged on oral antibiotics (bactrum).  Secondary Diagnosis:	Acute respiratory failure with hypoxia  Instructions for follow-up, activity and diet:	You has shortness of breath on admission with low levels of oxygen and high carbon dioxide. You were put on ventilator for respiratory support and your respiratory condition was most likely due to underlying lung infection, as evident on sputum and blood cultures. You were extubated after family's persistence and thereafter put on 4lt NC, extensive discussion was held with family regarding possible adverse consequences( acute respiratory failure) after extubation. You are being discharged  to home (4 Lt NC) with respiratory therapist as per family's wishes.  Secondary Diagnosis:	Hyperosmolar non-ketotic state in patient with type 2 diabetes mellitus  Instructions for follow-up, activity and diet:	You had high sugars on admission (685) and were started on insulin drip following which this was resolved and the drip was stopped. You are being discharged on tube feeds with Jevity (1/2 can every 4 hours) 3-4 cans per day.

## 2017-08-23 LAB
ANION GAP SERPL CALC-SCNC: 9 MMOL/L — SIGNIFICANT CHANGE UP (ref 5–17)
BUN SERPL-MCNC: 18 MG/DL — SIGNIFICANT CHANGE UP (ref 7–23)
CALCIUM SERPL-MCNC: 9 MG/DL — SIGNIFICANT CHANGE UP (ref 8.4–10.5)
CHLORIDE SERPL-SCNC: 100 MMOL/L — SIGNIFICANT CHANGE UP (ref 96–108)
CO2 SERPL-SCNC: 33 MMOL/L — HIGH (ref 22–31)
CREAT SERPL-MCNC: 0.6 MG/DL — SIGNIFICANT CHANGE UP (ref 0.5–1.3)
GLUCOSE SERPL-MCNC: 105 MG/DL — HIGH (ref 70–99)
HCT VFR BLD CALC: 29.7 % — LOW (ref 39–50)
HGB BLD-MCNC: 9.2 G/DL — LOW (ref 13–17)
MAGNESIUM SERPL-MCNC: 2.2 MG/DL — SIGNIFICANT CHANGE UP (ref 1.6–2.6)
MCHC RBC-ENTMCNC: 29.8 PG — SIGNIFICANT CHANGE UP (ref 27–34)
MCHC RBC-ENTMCNC: 31 G/DL — LOW (ref 32–36)
MCV RBC AUTO: 96.1 FL — SIGNIFICANT CHANGE UP (ref 80–100)
PHOSPHATE SERPL-MCNC: 2.7 MG/DL — SIGNIFICANT CHANGE UP (ref 2.5–4.5)
PLATELET # BLD AUTO: 161 K/UL — SIGNIFICANT CHANGE UP (ref 150–400)
POTASSIUM SERPL-MCNC: 3.7 MMOL/L — SIGNIFICANT CHANGE UP (ref 3.5–5.3)
POTASSIUM SERPL-SCNC: 3.7 MMOL/L — SIGNIFICANT CHANGE UP (ref 3.5–5.3)
RBC # BLD: 3.09 M/UL — LOW (ref 4.2–5.8)
RBC # FLD: 14.1 % — SIGNIFICANT CHANGE UP (ref 10.3–16.9)
SODIUM SERPL-SCNC: 142 MMOL/L — SIGNIFICANT CHANGE UP (ref 135–145)
WBC # BLD: 6.3 K/UL — SIGNIFICANT CHANGE UP (ref 3.8–10.5)
WBC # FLD AUTO: 6.3 K/UL — SIGNIFICANT CHANGE UP (ref 3.8–10.5)

## 2017-08-23 PROCEDURE — 99233 SBSQ HOSP IP/OBS HIGH 50: CPT | Mod: GC

## 2017-08-23 PROCEDURE — 71010: CPT | Mod: 26

## 2017-08-23 RX ORDER — PANTOPRAZOLE SODIUM 20 MG/1
40 TABLET, DELAYED RELEASE ORAL DAILY
Qty: 0 | Refills: 0 | Status: DISCONTINUED | OUTPATIENT
Start: 2017-08-23 | End: 2017-08-24

## 2017-08-23 RX ORDER — POTASSIUM CHLORIDE 20 MEQ
10 PACKET (EA) ORAL
Qty: 0 | Refills: 0 | Status: COMPLETED | OUTPATIENT
Start: 2017-08-23 | End: 2017-08-23

## 2017-08-23 RX ORDER — INSULIN GLARGINE 100 [IU]/ML
14 INJECTION, SOLUTION SUBCUTANEOUS AT BEDTIME
Qty: 0 | Refills: 0 | Status: DISCONTINUED | OUTPATIENT
Start: 2017-08-23 | End: 2017-08-24

## 2017-08-23 RX ORDER — HALOPERIDOL DECANOATE 100 MG/ML
1 INJECTION INTRAMUSCULAR
Qty: 0 | Refills: 0 | Status: DISCONTINUED | OUTPATIENT
Start: 2017-08-23 | End: 2017-08-24

## 2017-08-23 RX ADMIN — OXCARBAZEPINE 450 MILLIGRAM(S): 300 TABLET, FILM COATED ORAL at 22:35

## 2017-08-23 RX ADMIN — INSULIN GLARGINE 14 UNIT(S): 100 INJECTION, SOLUTION SUBCUTANEOUS at 22:43

## 2017-08-23 RX ADMIN — MIDODRINE HYDROCHLORIDE 5 MILLIGRAM(S): 2.5 TABLET ORAL at 05:42

## 2017-08-23 RX ADMIN — Medication 3 MILLILITER(S): at 05:23

## 2017-08-23 RX ADMIN — HALOPERIDOL DECANOATE 1 MILLIGRAM(S): 100 INJECTION INTRAMUSCULAR at 05:50

## 2017-08-23 RX ADMIN — LEVETIRACETAM 1500 MILLIGRAM(S): 250 TABLET, FILM COATED ORAL at 17:00

## 2017-08-23 RX ADMIN — LEVETIRACETAM 1500 MILLIGRAM(S): 250 TABLET, FILM COATED ORAL at 05:45

## 2017-08-23 RX ADMIN — QUETIAPINE FUMARATE 75 MILLIGRAM(S): 200 TABLET, FILM COATED ORAL at 05:42

## 2017-08-23 RX ADMIN — GABAPENTIN 300 MILLIGRAM(S): 400 CAPSULE ORAL at 05:40

## 2017-08-23 RX ADMIN — Medication 100 MILLIEQUIVALENT(S): at 09:57

## 2017-08-23 RX ADMIN — CHLORHEXIDINE GLUCONATE 15 MILLILITER(S): 213 SOLUTION TOPICAL at 05:44

## 2017-08-23 RX ADMIN — QUETIAPINE FUMARATE 75 MILLIGRAM(S): 200 TABLET, FILM COATED ORAL at 15:19

## 2017-08-23 RX ADMIN — Medication 3 MILLILITER(S): at 21:51

## 2017-08-23 RX ADMIN — QUETIAPINE FUMARATE 75 MILLIGRAM(S): 200 TABLET, FILM COATED ORAL at 22:34

## 2017-08-23 RX ADMIN — OXCARBAZEPINE 300 MILLIGRAM(S): 300 TABLET, FILM COATED ORAL at 11:11

## 2017-08-23 RX ADMIN — Medication 100 MILLIEQUIVALENT(S): at 08:49

## 2017-08-23 RX ADMIN — PANTOPRAZOLE SODIUM 40 MILLIGRAM(S): 20 TABLET, DELAYED RELEASE ORAL at 11:11

## 2017-08-23 RX ADMIN — Medication 3 MILLILITER(S): at 01:33

## 2017-08-23 RX ADMIN — Medication 100 MILLIEQUIVALENT(S): at 11:11

## 2017-08-23 RX ADMIN — CHLORHEXIDINE GLUCONATE 15 MILLILITER(S): 213 SOLUTION TOPICAL at 17:02

## 2017-08-23 RX ADMIN — HEPARIN SODIUM 5000 UNIT(S): 5000 INJECTION INTRAVENOUS; SUBCUTANEOUS at 05:43

## 2017-08-23 RX ADMIN — Medication 3 MILLILITER(S): at 14:34

## 2017-08-23 RX ADMIN — HEPARIN SODIUM 5000 UNIT(S): 5000 INJECTION INTRAVENOUS; SUBCUTANEOUS at 22:35

## 2017-08-23 RX ADMIN — Medication 10 MILLIGRAM(S): at 11:11

## 2017-08-23 RX ADMIN — GABAPENTIN 300 MILLIGRAM(S): 400 CAPSULE ORAL at 17:02

## 2017-08-23 RX ADMIN — ATORVASTATIN CALCIUM 40 MILLIGRAM(S): 80 TABLET, FILM COATED ORAL at 22:34

## 2017-08-23 RX ADMIN — Medication 3 MILLILITER(S): at 17:37

## 2017-08-23 RX ADMIN — Medication 25 MILLIGRAM(S): at 05:45

## 2017-08-23 RX ADMIN — HEPARIN SODIUM 5000 UNIT(S): 5000 INJECTION INTRAVENOUS; SUBCUTANEOUS at 14:09

## 2017-08-23 RX ADMIN — Medication 3 MILLILITER(S): at 09:37

## 2017-08-23 RX ADMIN — MEROPENEM 100 MILLIGRAM(S): 1 INJECTION INTRAVENOUS at 11:11

## 2017-08-23 NOTE — PROGRESS NOTE ADULT - SUBJECTIVE AND OBJECTIVE BOX
INTERVAL HPI/OVERNIGHT EVENTS:  Pt was seen and examined at the bedside. He was extubated yesterday and in the AM has not been responding to name, commands. Did not show any response to painful stimuli. However, during the course of the day, he became responsive and was able to open eyes intermittently on verbal commands/sternal rub.     VITAL SIGNS:  T(F): 98.3 (08-23-17 @ 15:00)  HR: 80 (08-23-17 @ 16:00)  BP: 108/54 (08-23-17 @ 16:00)  RR: 28 (08-23-17 @ 16:00)  SpO2: 100% (08-23-17 @ 16:00)  Wt(kg): --    PHYSICAL EXAM:    Constitutional: Pt extubated and now on aerosol mask.    HEENT: NC/AT  Respiratory: decreased BS b/l; diffuse rhonchi b/l  Cardiovascular: +S1/S2; no M/R/G heard  Abdomen: PEG in place, site appears clean and dry; soft, NT/ND; +BS   Extremities: significant contractures of UE, most notably on the left; WWP; no edema b/l LE  Neurological: AAOx0.  Does not respond to name or verbal stimuli. Does not withdraws from painful stimuli. Pupils sluggish but reactive.       MEDICATIONS  (STANDING):  atorvastatin 40 milliGRAM(s) Oral at bedtime  busPIRone 10 milliGRAM(s) Oral daily  gabapentin 300 milliGRAM(s) Oral two times a day  ALBUTerol/ipratropium for Nebulization 3 milliLiter(s) Nebulizer every 4 hours  chlorhexidine 0.12% Liquid 15 milliLiter(s) Swish and Spit two times a day  levETIRAcetam  Solution 1500 milliGRAM(s) Oral two times a day  OXcarbazepine Suspension 300 milliGRAM(s) Oral daily  OXcarbazepine Suspension 450 milliGRAM(s) Oral at bedtime  heparin  Injectable 5000 Unit(s) SubCutaneous every 8 hours  meropenem IVPB 1000 milliGRAM(s) IV Intermittent every 12 hours  insulin lispro (HumaLOG) corrective regimen sliding scale   SubCutaneous every 6 hours  dextrose 5%. 1000 milliLiter(s) (50 mL/Hr) IV Continuous <Continuous>  dextrose 50% Injectable 12.5 Gram(s) IV Push once  dextrose 50% Injectable 25 Gram(s) IV Push once  dextrose 50% Injectable 25 Gram(s) IV Push once  metolazone 10 milliGRAM(s) Oral two times a day  QUEtiapine 75 milliGRAM(s) Oral every 8 hours  insulin glargine Injectable (LANTUS) 7 Unit(s) SubCutaneous at bedtime  pantoprazole   Suspension 40 milliGRAM(s) Enteral Tube daily    MEDICATIONS  (PRN):  dextrose Gel 1 Dose(s) Oral once PRN Blood Glucose LESS THAN 70 milliGRAM(s)/deciliter  glucagon  Injectable 1 milliGRAM(s) IntraMuscular once PRN Glucose LESS THAN 70 milligrams/deciliter  haloperidol     Tablet 1 milliGRAM(s) Oral two times a day PRN agitation      Allergies    No Known Allergies    Intolerances        LABS:                        9.2    6.3   )-----------( 161      ( 23 Aug 2017 05:07 )             29.7     08-23    142  |  100  |  18  ----------------------------<  105<H>  3.7   |  33<H>  |  0.60    Ca    9.0      23 Aug 2017 05:08  Phos  2.7     08-23  Mg     2.2     08-23            RADIOLOGY & ADDITIONAL TESTS: Reviewed    · Assessment	  89M PMH of AD, epilepsy, aspiration pneumonitis s/p PEG placement, 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 PNA initially intubated for airway protection, admitted to MICU for monitoring, now s/p extubation on 8/22.    NEURO  #AMS   -Likely 2/2 sepsis superimposed on baseline AD. Family reports that at baseline patient is alert and responsive.  -c/w abx (meropenem) to treat sepsis and achieve source control, as noted be  -c/w Seroquel to 75 q8h (down from home dose 150 q8) and Haldol to 1mg BID (down from 2mg BID) - family refused Haldol today  -Pt remains non-responsive to commands and minimally arousable on lower doses, however unable to continue to titrate downward on sedative medications 2/2 continued agitation leading to tachycardia/tachypnea  -Mental status is the limiting factor to pt's extubation, family aware of risks associated w/ extubation with current mental status and concern for inability to protect airway. Discussion w/ family at bedside by Dr. Yuen - family wants to attempt extubation w/ understanding that if he does not tolerate extubation pt will have to be reintubated and sent for trach (likely by ENT as pt contractures limit extension of neck for percutaneous at bedside).   #Seizure  -Pt does not appear to be having clinical seizures at this time, likely not the etiology of AMS  -c/w home Keppra  -c/w home Tripleptal    CV  #Septic Shock 2/2 UTI vs. PNA   -Pt hypotensive w/ elevated lactate and likely source of infection urinary tract vs. PNA (hx aspiration pneumonitis)  -blood cx's 8/16 growing Klebsiella ESBL sensitive to penems  -Urine cx's growing Klebsiella ESBL/E.Coli/Enterococcus, likely the source of bacteremia; Klebsiella/E.Coli sensitive to penems however meropenem not covering Enterococcus; however, pt has clinically improved w/ meropenem tx so will hold off on additional abx at this time  -Sputum cx now growing Pseudomonas and E.Coli ESBL as well; E.Coli sensitive to penem, however not currently covering pseudomonas. Pt continues to remain clinically improved w/ meropenem tx, so will hold off on adding additional anti-pseudomonal agent at this time, may just be colonization rather that clinical infectious picture  -repeat sputum cx rejected 2/2 contamination - will send repeat  -repeat blood cx's drawn 8/17 negative to date, will continue to follow  -if pt spikes fever, will re-cx and do full fever   -c/w meropenem 1g q12hrs (currently day #6), to complete 7-10d. course  -pt no longer requiring NE, currently weaned midodrine 5 q8 to 5 q12 today; continue w/ weaning as BP allows  #Hypotension  - Likely 2/2 septic shock, currently off Levo and pressures have been maintained w/ midodrine and so far have tolerated weaning  - continue to tx underlying infection  - c/w tapering stress dose steroids, decreased from 25 q12 to 25 q24 today  -pt no longer requiring NE, currently weaned midodrine 5 q8 to 5 q12 today; continue w/ weaning as BP allows  - Fluid overloaded 2/2 IVF - c/w metolazone 10mg BID and monitor UOP and pressures in the setting of diuresis    RESPIRATORY  #Acute on chronic hypercapnic respiratory failure s/p intubation  -Repeat VBG shows improvement of hypercapnia and oxygenation.    -pt extubated this afternoon, currently tolerating extubation so far on aerosol mask at FiO2 50% at 10LPM  -Discussion w/ family at bedside by Dr. Yuen - family w/ understanding that if he does not tolerate extubation pt will have to be reintubated and sent for trach (likely by ENT as pt contractures limit extension of neck for percutaneous at bedside)    GI  #Constipation/Diarrhea  -monitor for BM's     ID  #Klebsiella bacteremia  -Pt w/ hx of aspiration pneumonitis and ESBL UTI. CXR shows pleural effusions but no definitive infiltrate.  -Blood: Klebsiella sensitive to penems; Urine: Klebsiella ESBL sensitive to penem/E.Coli ESBL sensitive to penem/Enterococcus; Sputum: E.Coli ESBl sensitive to penem/Pseudomonas sensitive to Cipro/gent/Zosyn  -c/w meropenem, as above    ENDO  #HHS (resolved)  -2/2 steroid use, blood glucose of 685 on admission.  -insulin gtt d/c'd as glucose downtrended and IVF d/c'd in the setting of volume overload  -transitioned to FSG/SSI + Lantus 14u at bedtime    FEN  #Fluids: none  #Monitor, Replete to K>4 and Mg>2  #Diet: Glucerna 1.2 w/ goal rate 60/hr    PPX  -SQH 5000 u q8h  -Protonix 40 IV qd    Code Status: FULL CODE INTERVAL HPI/OVERNIGHT EVENTS:  Pt was seen and examined at the bedside. He was extubated yesterday and in the AM has not been responding to name, commands. Did not show any response to painful stimuli. However, during the course of the day, he became responsive and was able to open eyes intermittently on verbal commands/sternal rub.     VITAL SIGNS:  T(F): 98.3 (08-23-17 @ 15:00)  HR: 80 (08-23-17 @ 16:00)  BP: 108/54 (08-23-17 @ 16:00)  RR: 28 (08-23-17 @ 16:00)  SpO2: 100% (08-23-17 @ 16:00)  Wt(kg): --    PHYSICAL EXAM:    Constitutional: Pt extubated and now on aerosol mask.    HEENT: NC/AT  Respiratory: decreased BS b/l; diffuse rhonchi b/l  Cardiovascular: +S1/S2; no M/R/G heard  Abdomen: PEG in place, site appears clean and dry; soft, NT/ND; +BS   Extremities: significant contractures of UE, most notably on the left; WWP; no edema b/l LE  Neurological: AAOx0.  Does not respond to name or verbal stimuli. Does not withdraws from painful stimuli. Pupils sluggish but reactive.       MEDICATIONS  (STANDING):  atorvastatin 40 milliGRAM(s) Oral at bedtime  busPIRone 10 milliGRAM(s) Oral daily  gabapentin 300 milliGRAM(s) Oral two times a day  ALBUTerol/ipratropium for Nebulization 3 milliLiter(s) Nebulizer every 4 hours  chlorhexidine 0.12% Liquid 15 milliLiter(s) Swish and Spit two times a day  levETIRAcetam  Solution 1500 milliGRAM(s) Oral two times a day  OXcarbazepine Suspension 300 milliGRAM(s) Oral daily  OXcarbazepine Suspension 450 milliGRAM(s) Oral at bedtime  heparin  Injectable 5000 Unit(s) SubCutaneous every 8 hours  meropenem IVPB 1000 milliGRAM(s) IV Intermittent every 12 hours  insulin lispro (HumaLOG) corrective regimen sliding scale   SubCutaneous every 6 hours  dextrose 5%. 1000 milliLiter(s) (50 mL/Hr) IV Continuous <Continuous>  dextrose 50% Injectable 12.5 Gram(s) IV Push once  dextrose 50% Injectable 25 Gram(s) IV Push once  dextrose 50% Injectable 25 Gram(s) IV Push once  metolazone 10 milliGRAM(s) Oral two times a day  QUEtiapine 75 milliGRAM(s) Oral every 8 hours  insulin glargine Injectable (LANTUS) 7 Unit(s) SubCutaneous at bedtime  pantoprazole   Suspension 40 milliGRAM(s) Enteral Tube daily    MEDICATIONS  (PRN):  dextrose Gel 1 Dose(s) Oral once PRN Blood Glucose LESS THAN 70 milliGRAM(s)/deciliter  glucagon  Injectable 1 milliGRAM(s) IntraMuscular once PRN Glucose LESS THAN 70 milligrams/deciliter  haloperidol     Tablet 1 milliGRAM(s) Oral two times a day PRN agitation      Allergies    No Known Allergies    Intolerances        LABS:                        9.2    6.3   )-----------( 161      ( 23 Aug 2017 05:07 )             29.7     08-23    142  |  100  |  18  ----------------------------<  105<H>  3.7   |  33<H>  |  0.60    Ca    9.0      23 Aug 2017 05:08  Phos  2.7     08-23  Mg     2.2     08-23            RADIOLOGY & ADDITIONAL TESTS: Reviewed    · Assessment	  89M PMH of AD, epilepsy, aspiration pneumonitis s/p PEG placement, 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 PNA initially intubated for airway protection, admitted to MICU for monitoring, now s/p extubation on 8/22.    NEURO  #AMS   -Likely 2/2 sepsis superimposed on baseline AD. Family reports that at baseline patient is alert and responsive.  -Plan to [place]  -c/w Seroquel to 75 q8h (down from home dose 150 q8) and Haldol to 1mg BID (down from 2mg BID) - family refused Haldol today  -Pt remains non-responsive to commands and minimally arousable on lower doses, however unable to continue to titrate downward on sedative medications 2/2 continued agitation leading to tachycardia/tachypnea  -Mental status is the limiting factor to pt's extubation, family aware of risks associated w/ extubation with current mental status and concern for inability to protect airway. Discussion w/ family at bedside by Dr. Yuen - family wants to attempt extubation w/ understanding that if he does not tolerate extubation pt will have to be reintubated and sent for trach (likely by ENT as pt contractures limit extension of neck for percutaneous at bedside).   #Seizure  -Pt does not appear to be having clinical seizures at this time, likely not the etiology of AMS  -c/w home Keppra  -c/w home Tripleptal    CV  #Septic Shock 2/2 UTI vs. PNA   -Pt hypotensive w/ elevated lactate and likely source of infection urinary tract vs. PNA (hx aspiration pneumonitis)  -blood cx's 8/16 growing Klebsiella ESBL sensitive to penems  -Urine cx's growing Klebsiella ESBL/E.Coli/Enterococcus, likely the source of bacteremia; Klebsiella/E.Coli sensitive to penems however meropenem not covering Enterococcus; however, pt has clinically improved w/ meropenem tx so will hold off on additional abx at this time  -Sputum cx now growing Pseudomonas and E.Coli ESBL as well; E.Coli sensitive to penem, however not currently covering pseudomonas. Pt continues to remain clinically improved w/ meropenem tx, so will hold off on adding additional anti-pseudomonal agent at this time, may just be colonization rather that clinical infectious picture  -repeat sputum cx rejected 2/2 contamination - will send repeat  -repeat blood cx's drawn 8/17 negative to date, will continue to follow  -if pt spikes fever, will re-cx and do full fever   -c/w meropenem 1g q12hrs (currently day #6), to complete 7-10d. course  -pt no longer requiring NE, currently weaned midodrine 5 q8 to 5 q12 today; continue w/ weaning as BP allows  #Hypotension  - Likely 2/2 septic shock, currently off Levo and pressures have been maintained w/ midodrine and so far have tolerated weaning  - continue to tx underlying infection  - c/w tapering stress dose steroids, decreased from 25 q12 to 25 q24 today  -pt no longer requiring NE, currently weaned midodrine 5 q8 to 5 q12 today; continue w/ weaning as BP allows  - Fluid overloaded 2/2 IVF - c/w metolazone 10mg BID and monitor UOP and pressures in the setting of diuresis    RESPIRATORY  #Acute on chronic hypercapnic respiratory failure s/p intubation  -Repeat VBG shows improvement of hypercapnia and oxygenation.    -pt extubated this afternoon, currently tolerating extubation so far on aerosol mask at FiO2 50% at 10LPM  -Discussion w/ family at bedside by Dr. Yuen - family w/ understanding that if he does not tolerate extubation pt will have to be reintubated and sent for trach (likely by ENT as pt contractures limit extension of neck for percutaneous at bedside)    GI  #Constipation/Diarrhea  -monitor for BM's     ID  #Klebsiella bacteremia  -Pt w/ hx of aspiration pneumonitis and ESBL UTI. CXR shows pleural effusions but no definitive infiltrate.  -Blood: Klebsiella sensitive to penems; Urine: Klebsiella ESBL sensitive to penem/E.Coli ESBL sensitive to penem/Enterococcus; Sputum: E.Coli ESBl sensitive to penem/Pseudomonas sensitive to Cipro/gent/Zosyn  -c/w meropenem, as above    ENDO  #HHS (resolved)  -2/2 steroid use, blood glucose of 685 on admission.  -insulin gtt d/c'd as glucose downtrended and IVF d/c'd in the setting of volume overload  -transitioned to FSG/SSI + Lantus 14u at bedtime    FEN  #Fluids: none  #Monitor, Replete to K>4 and Mg>2  #Diet: Glucerna 1.2 w/ goal rate 60/hr    PPX  -SQH 5000 u q8h  -Protonix 40 IV qd    Code Status: FULL CODE INTERVAL HPI/OVERNIGHT EVENTS:  Pt was seen and examined at the bedside. He was extubated yesterday and in the AM has not been responding to name, commands. Did not show any response to painful stimuli. However, during the course of the day, he became responsive and was able to open eyes intermittently on verbal commands/sternal rub.     VITAL SIGNS:  T(F): 98.3 (08-23-17 @ 15:00)  HR: 80 (08-23-17 @ 16:00)  BP: 108/54 (08-23-17 @ 16:00)  RR: 28 (08-23-17 @ 16:00)  SpO2: 100% (08-23-17 @ 16:00)  Wt(kg): --    PHYSICAL EXAM:    Constitutional: Pt extubated and now on aerosol mask.    HEENT: NC/AT  Respiratory: decreased BS b/l; diffuse rhonchi b/l  Cardiovascular: +S1/S2; no M/R/G heard  Abdomen: PEG in place, site appears clean and dry; soft, NT/ND; +BS   Extremities: significant contractures of UE, most notably on the left; WWP; no edema b/l LE  Neurological: AAOx0.  Does not respond to name or verbal stimuli. Does not withdraws from painful stimuli. Pupils sluggish but reactive.       MEDICATIONS  (STANDING):  atorvastatin 40 milliGRAM(s) Oral at bedtime  busPIRone 10 milliGRAM(s) Oral daily  gabapentin 300 milliGRAM(s) Oral two times a day  ALBUTerol/ipratropium for Nebulization 3 milliLiter(s) Nebulizer every 4 hours  chlorhexidine 0.12% Liquid 15 milliLiter(s) Swish and Spit two times a day  levETIRAcetam  Solution 1500 milliGRAM(s) Oral two times a day  OXcarbazepine Suspension 300 milliGRAM(s) Oral daily  OXcarbazepine Suspension 450 milliGRAM(s) Oral at bedtime  heparin  Injectable 5000 Unit(s) SubCutaneous every 8 hours  meropenem IVPB 1000 milliGRAM(s) IV Intermittent every 12 hours  insulin lispro (HumaLOG) corrective regimen sliding scale   SubCutaneous every 6 hours  dextrose 5%. 1000 milliLiter(s) (50 mL/Hr) IV Continuous <Continuous>  dextrose 50% Injectable 12.5 Gram(s) IV Push once  dextrose 50% Injectable 25 Gram(s) IV Push once  dextrose 50% Injectable 25 Gram(s) IV Push once  metolazone 10 milliGRAM(s) Oral two times a day  QUEtiapine 75 milliGRAM(s) Oral every 8 hours  insulin glargine Injectable (LANTUS) 7 Unit(s) SubCutaneous at bedtime  pantoprazole   Suspension 40 milliGRAM(s) Enteral Tube daily    MEDICATIONS  (PRN):  dextrose Gel 1 Dose(s) Oral once PRN Blood Glucose LESS THAN 70 milliGRAM(s)/deciliter  glucagon  Injectable 1 milliGRAM(s) IntraMuscular once PRN Glucose LESS THAN 70 milligrams/deciliter  haloperidol     Tablet 1 milliGRAM(s) Oral two times a day PRN agitation      Allergies    No Known Allergies    Intolerances        LABS:                        9.2    6.3   )-----------( 161      ( 23 Aug 2017 05:07 )             29.7     08-23    142  |  100  |  18  ----------------------------<  105<H>  3.7   |  33<H>  |  0.60    Ca    9.0      23 Aug 2017 05:08  Phos  2.7     08-23  Mg     2.2     08-23            RADIOLOGY & ADDITIONAL TESTS: Reviewed    · Assessment	  89M PMH of AD, epilepsy, aspiration pneumonitis s/p PEG placement, 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 PNA initially intubated for airway protection, admitted to MICU for monitoring, now s/p extubation on 8/22.    NEURO  #AMS   -Likely 2/2 sepsis superimposed on baseline AD. Family reports that at baseline patient is alert and responsive.  -Plan to transition to PO bactrum from tomorrow and dc meropenam with plan for discharge tomorrow.  -c/w Seroquel to 75mg q8h (down from home dose 150 q8) and Haldol to 1mg BID now prn.      -#Seizure  -Pt does not appear to be having clinical seizures at this time, likely not the etiology of AMS  -c/w home Keppra  -c/w home Tripleptal    CV  #Septic Shock 2/2 UTI vs. PNA   -Pt now normotensive and maintaining MAP above 65, midodrine dc;d    -blood cx's 8/16 growing Klebsiella ESBL sensitive to penems  -Urine cx's growing Klebsiella ESBL/E.Coli/Enterococcus, likely the source of bacteremia; Klebsiella/E.Coli sensitive to penems however meropenem not covering Enterococcus; however, pt has clinically improved w/ meropenem tx and now plan to start PO bactrum tomorrow.  -Sputum cx  growing Pseudomonas and E.Coli ESBL as well; E.Coli sensitive to penem, however not currently covering pseudomonas. Pt continues to remain clinically improved w/ meropenem tx, , may just be colonization rather that clinical infectious picture  -repeat blood cx's drawn 8/17 negative to date, will continue to follow  -if pt spikes fever, will re-cx and do full fever       #Hypotension (resolved)  - Likely 2/2 septic shock, currently off Levo and dc'd midodrine and so far have tolerated weaning  - c/w tapering stress dose steroids, now at 20mg qd.  - Fluid overloaded 2/2 IVF - c/w metolazone 10mg BID and monitor UOP.     RESPIRATORY  #Acute on chronic hypercapnic respiratory failure s/p extubation on 8/22  -Repeat VBG shows improvement of hypercapnia and oxygenation.    - currently tolerating extubation so far on aerosol mask at FiO2 50% at 10LPM      GI  #Constipation/Diarrhea  -monitor for BM's     ID  #Klebsiella bacteremia  -Pt w/ hx of aspiration pneumonitis and ESBL UTI. CXR shows pleural effusions but no definitive infiltrate.  -Blood: Klebsiella sensitive to penems; Urine: Klebsiella ESBL sensitive to penem/E.Coli ESBL sensitive to penem/Enterococcus; Sputum: E.Coli ESBl sensitive to penem/Pseudomonas sensitive to Cipro/gent/Zosyn  -transition to PO bactrum tomorrow.    ENDO  #HHS (resolved)  -2/2 steroid use, blood glucose of 685 on admission.  -insulin gtt d/c'd as glucose downtrended and IVF d/c'd in the setting of volume overload  -transitioned to FSG/SSI + Lantus 14u at bedtime    FEN  #Fluids: none  #Monitor, Replete to K>4 and Mg>2  #Diet: Glucerna 1.2 w/ goal rate 60/hr    PPX  -SQH 5000 u q8h  -Protonix 40 IV qd    Code Status: FULL CODE INTERVAL HPI/OVERNIGHT EVENTS:  Pt was seen and examined at the bedside. He was extubated yesterday and in the AM has not been responding to name, commands. Did not show any response to painful stimuli. However, during the course of the day, he became responsive and was able to open eyes intermittently on verbal commands/sternal rub. plan for dc tomorrow with respiratory therapist    VITAL SIGNS:  T(F): 98.3 (08-23-17 @ 15:00)  HR: 80 (08-23-17 @ 16:00)  BP: 108/54 (08-23-17 @ 16:00)  RR: 28 (08-23-17 @ 16:00)  SpO2: 100% (08-23-17 @ 16:00)  Wt(kg): --    PHYSICAL EXAM:    Constitutional: Pt extubated and now on aerosol mask.    HEENT: NC/AT  Respiratory: decreased BS b/l; diffuse rhonchi b/l  Cardiovascular: +S1/S2; no M/R/G heard  Abdomen: PEG in place, site appears clean and dry; soft, NT/ND; +BS   Extremities: significant contractures of UE, most notably on the left; WWP; no edema b/l LE  Neurological: AAOx0.  Does not respond to name or verbal stimuli. Does not withdraws from painful stimuli. Pupils sluggish but reactive.       MEDICATIONS  (STANDING):  atorvastatin 40 milliGRAM(s) Oral at bedtime  busPIRone 10 milliGRAM(s) Oral daily  gabapentin 300 milliGRAM(s) Oral two times a day  ALBUTerol/ipratropium for Nebulization 3 milliLiter(s) Nebulizer every 4 hours  chlorhexidine 0.12% Liquid 15 milliLiter(s) Swish and Spit two times a day  levETIRAcetam  Solution 1500 milliGRAM(s) Oral two times a day  OXcarbazepine Suspension 300 milliGRAM(s) Oral daily  OXcarbazepine Suspension 450 milliGRAM(s) Oral at bedtime  heparin  Injectable 5000 Unit(s) SubCutaneous every 8 hours  meropenem IVPB 1000 milliGRAM(s) IV Intermittent every 12 hours  insulin lispro (HumaLOG) corrective regimen sliding scale   SubCutaneous every 6 hours  dextrose 5%. 1000 milliLiter(s) (50 mL/Hr) IV Continuous <Continuous>  dextrose 50% Injectable 12.5 Gram(s) IV Push once  dextrose 50% Injectable 25 Gram(s) IV Push once  dextrose 50% Injectable 25 Gram(s) IV Push once  metolazone 10 milliGRAM(s) Oral two times a day  QUEtiapine 75 milliGRAM(s) Oral every 8 hours  insulin glargine Injectable (LANTUS) 7 Unit(s) SubCutaneous at bedtime  pantoprazole   Suspension 40 milliGRAM(s) Enteral Tube daily    MEDICATIONS  (PRN):  dextrose Gel 1 Dose(s) Oral once PRN Blood Glucose LESS THAN 70 milliGRAM(s)/deciliter  glucagon  Injectable 1 milliGRAM(s) IntraMuscular once PRN Glucose LESS THAN 70 milligrams/deciliter  haloperidol     Tablet 1 milliGRAM(s) Oral two times a day PRN agitation      Allergies    No Known Allergies    Intolerances        LABS:                        9.2    6.3   )-----------( 161      ( 23 Aug 2017 05:07 )             29.7     08-23    142  |  100  |  18  ----------------------------<  105<H>  3.7   |  33<H>  |  0.60    Ca    9.0      23 Aug 2017 05:08  Phos  2.7     08-23  Mg     2.2     08-23            RADIOLOGY & ADDITIONAL TESTS: Reviewed    · Assessment	  89M PMH of AD, epilepsy, aspiration pneumonitis s/p PEG placement, 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 PNA initially intubated for airway protection, admitted to MICU for monitoring, now s/p extubation on 8/22.    NEURO  #AMS   -plan for dc tomorrow with respiratory therapist.  -Likely 2/2 sepsis superimposed on baseline AD. Family reports that at baseline patient is alert and responsive.  -Plan to transition to PO bactrum from tomorrow and dc meropenam with plan for discharge tomorrow.  -c/w Seroquel to 75mg q8h (down from home dose 150 q8) and Haldol to 1mg BID now prn.      -#Seizure  -Pt does not appear to be having clinical seizures at this time, likely not the etiology of AMS  -c/w home Keppra  -c/w home Tripleptal    CV  #Septic Shock 2/2 UTI vs. PNA   -Pt now normotensive and maintaining MAP above 65, midodrine dc;d    -blood cx's 8/16 growing Klebsiella ESBL sensitive to penems  -Urine cx's growing Klebsiella ESBL/E.Coli/Enterococcus, likely the source of bacteremia; Klebsiella/E.Coli sensitive to penems however meropenem not covering Enterococcus; however, pt has clinically improved w/ meropenem tx and now plan to start PO bactrum tomorrow.  -Sputum cx  growing Pseudomonas and E.Coli ESBL as well; E.Coli sensitive to penem, however not currently covering pseudomonas. Pt continues to remain clinically improved w/ meropenem tx, , may just be colonization rather that clinical infectious picture  -repeat blood cx's drawn 8/17 negative to date, will continue to follow  -if pt spikes fever, will re-cx and do full fever       #Hypotension (resolved)  - Likely 2/2 septic shock, currently off Levo and dc'd midodrine and so far have tolerated weaning  - c/w tapering stress dose steroids, now at 20mg qd.  - Fluid overloaded 2/2 IVF - c/w metolazone 10mg BID and monitor UOP.     RESPIRATORY  #Acute on chronic hypercapnic respiratory failure s/p extubation on 8/22  -Repeat VBG shows improvement of hypercapnia and oxygenation.    - currently tolerating extubation so far on aerosol mask at FiO2 50% at 10LPM      GI  #Constipation/Diarrhea  -monitor for BM's     ID  #Klebsiella bacteremia  -Pt w/ hx of aspiration pneumonitis and ESBL UTI. CXR shows pleural effusions but no definitive infiltrate.  -Blood: Klebsiella sensitive to penems; Urine: Klebsiella ESBL sensitive to penem/E.Coli ESBL sensitive to penem/Enterococcus; Sputum: E.Coli ESBl sensitive to penem/Pseudomonas sensitive to Cipro/gent/Zosyn  -transition to PO bactrum tomorrow.    ENDO  #HHS (resolved)  -2/2 steroid use, blood glucose of 685 on admission.  -insulin gtt d/c'd as glucose downtrended and IVF d/c'd in the setting of volume overload  -transitioned to FSG/SSI + Lantus 14u at bedtime    FEN  #Fluids: none  #Monitor, Replete to K>4 and Mg>2  #Diet: Glucerna 1.2 w/ goal rate 60/hr and lantus at 14 qhs     PPX  -SQH 5000 u q8h  -Protonix 40 IV qd    Code Status: FULL CODE

## 2017-08-23 NOTE — CHART NOTE - NSCHARTNOTEFT_GEN_A_CORE
Admitting Diagnosis:   89M PMH of AD, epilepsy, aspiration pneumonitis s/p PEG placement, 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 PNA initially intubated for airway protection, admitted to MICU for monitoring, now s/p extubation on 8/22.    PAST MEDICAL & SURGICAL HISTORY:  Cardiomegaly  Legally blind  Seizure disorder  Prostate hyperplasia with urinary obstruction  Atherosclerosis of coronary artery: CAD (coronary artery disease)  Essential hypertension: HTN (hypertension)  Cerebral artery occlusion with cerebral infarction: CVA (cerebral vascular accident)  Hyperlipidemia: HLD (hyperlipidemia)  Iron deficiency anemia: Iron deficiency anemia  S/P percutaneous endoscopic gastrostomy (PEG) tube placement  Grubbs catheter in place    Current Nutrition Order: NPO    GI Issues: Last BM 8/20    Pain: None reported    Skin Integrity: Intact    Labs:   08-23    142  |  100  |  18  ----------------------------<  105<H>  3.7   |  33<H>  |  0.60    Ca    9.0      23 Aug 2017 05:08  Phos  2.7     08-23  Mg     2.2     08-23      CAPILLARY BLOOD GLUCOSE  139 (23 Aug 2017 11:01)  120 (23 Aug 2017 00:00)  85 (22 Aug 2017 17:00)    Medications:  MEDICATIONS  (STANDING):  atorvastatin 40 milliGRAM(s) Oral at bedtime  busPIRone 10 milliGRAM(s) Oral daily  gabapentin 300 milliGRAM(s) Oral two times a day  ALBUTerol/ipratropium for Nebulization 3 milliLiter(s) Nebulizer every 4 hours  chlorhexidine 0.12% Liquid 15 milliLiter(s) Swish and Spit two times a day  levETIRAcetam  Solution 1500 milliGRAM(s) Oral two times a day  OXcarbazepine Suspension 300 milliGRAM(s) Oral daily  OXcarbazepine Suspension 450 milliGRAM(s) Oral at bedtime  heparin  Injectable 5000 Unit(s) SubCutaneous every 8 hours  meropenem IVPB 1000 milliGRAM(s) IV Intermittent every 12 hours  insulin lispro (HumaLOG) corrective regimen sliding scale   SubCutaneous every 6 hours  dextrose 5%. 1000 milliLiter(s) (50 mL/Hr) IV Continuous <Continuous>  dextrose 50% Injectable 12.5 Gram(s) IV Push once  dextrose 50% Injectable 25 Gram(s) IV Push once  dextrose 50% Injectable 25 Gram(s) IV Push once  metolazone 10 milliGRAM(s) Oral two times a day  QUEtiapine 75 milliGRAM(s) Oral every 8 hours  insulin glargine Injectable (LANTUS) 7 Unit(s) SubCutaneous at bedtime  pantoprazole   Suspension 40 milliGRAM(s) Enteral Tube daily    MEDICATIONS  (PRN):  dextrose Gel 1 Dose(s) Oral once PRN Blood Glucose LESS THAN 70 milliGRAM(s)/deciliter  glucagon  Injectable 1 milliGRAM(s) IntraMuscular once PRN Glucose LESS THAN 70 milligrams/deciliter  haloperidol     Tablet 1 milliGRAM(s) Oral two times a day PRN agitation    Weight:  75.6 kg 8/16  70.3 kg 8/17  75.8 kg 8/18  72 kg 8/19  76.2 kg 8/21    Weight Change: Relatively stable    Estimated energy needs using 70 kg IBW:  25-30 kcal/kg (6194-9046 kcal).   1.2-1.4 g/kg (84-98 g protein).   30-35 mL/kg (2034-8074 mL fluid).      Subjective: N/A this visit. Pt NPO & resting s/p extubation at time of visit.     Previous Nutrition Diagnosis: Inadequate energy intake RT NPO AEB meeting 0% of nutrition needs.    Active [ X  ]  Resolved [   ]    Goal: Restart EN within 12-24 hours.     Recommendations: Glucerna 1.2 at 62 mL x 24 hr via PEG to provide: 1488 mL TV, 1786 kcal, 89 g protein, & 1198 mL fluid.    Education: N/A this visit 2/2 lethargy    Risk Level: High [X   ] Moderate [   ] Low [   ]

## 2017-08-24 VITALS
DIASTOLIC BLOOD PRESSURE: 50 MMHG | OXYGEN SATURATION: 100 % | HEART RATE: 56 BPM | SYSTOLIC BLOOD PRESSURE: 101 MMHG | RESPIRATION RATE: 22 BRPM

## 2017-08-24 LAB
ANION GAP SERPL CALC-SCNC: 8 MMOL/L — SIGNIFICANT CHANGE UP (ref 5–17)
BUN SERPL-MCNC: 23 MG/DL — SIGNIFICANT CHANGE UP (ref 7–23)
CALCIUM SERPL-MCNC: 9.1 MG/DL — SIGNIFICANT CHANGE UP (ref 8.4–10.5)
CHLORIDE SERPL-SCNC: 99 MMOL/L — SIGNIFICANT CHANGE UP (ref 96–108)
CO2 SERPL-SCNC: 32 MMOL/L — HIGH (ref 22–31)
CREAT SERPL-MCNC: 0.6 MG/DL — SIGNIFICANT CHANGE UP (ref 0.5–1.3)
GLUCOSE SERPL-MCNC: 138 MG/DL — HIGH (ref 70–99)
HCT VFR BLD CALC: 27.1 % — LOW (ref 39–50)
HGB BLD-MCNC: 8.7 G/DL — LOW (ref 13–17)
MAGNESIUM SERPL-MCNC: 2.2 MG/DL — SIGNIFICANT CHANGE UP (ref 1.6–2.6)
MCHC RBC-ENTMCNC: 30.1 PG — SIGNIFICANT CHANGE UP (ref 27–34)
MCHC RBC-ENTMCNC: 32.1 G/DL — SIGNIFICANT CHANGE UP (ref 32–36)
MCV RBC AUTO: 93.8 FL — SIGNIFICANT CHANGE UP (ref 80–100)
PHOSPHATE SERPL-MCNC: 2.9 MG/DL — SIGNIFICANT CHANGE UP (ref 2.5–4.5)
PLATELET # BLD AUTO: 165 K/UL — SIGNIFICANT CHANGE UP (ref 150–400)
POTASSIUM SERPL-MCNC: 4 MMOL/L — SIGNIFICANT CHANGE UP (ref 3.5–5.3)
POTASSIUM SERPL-SCNC: 4 MMOL/L — SIGNIFICANT CHANGE UP (ref 3.5–5.3)
RBC # BLD: 2.89 M/UL — LOW (ref 4.2–5.8)
RBC # FLD: 14.5 % — SIGNIFICANT CHANGE UP (ref 10.3–16.9)
SODIUM SERPL-SCNC: 139 MMOL/L — SIGNIFICANT CHANGE UP (ref 135–145)
WBC # BLD: 6.4 K/UL — SIGNIFICANT CHANGE UP (ref 3.8–10.5)
WBC # FLD AUTO: 6.4 K/UL — SIGNIFICANT CHANGE UP (ref 3.8–10.5)

## 2017-08-24 PROCEDURE — 87040 BLOOD CULTURE FOR BACTERIA: CPT

## 2017-08-24 PROCEDURE — 87150 DNA/RNA AMPLIFIED PROBE: CPT

## 2017-08-24 PROCEDURE — 99292 CRITICAL CARE ADDL 30 MIN: CPT | Mod: 25

## 2017-08-24 PROCEDURE — 96375 TX/PRO/DX INJ NEW DRUG ADDON: CPT | Mod: XU

## 2017-08-24 PROCEDURE — 84300 ASSAY OF URINE SODIUM: CPT

## 2017-08-24 PROCEDURE — 83735 ASSAY OF MAGNESIUM: CPT

## 2017-08-24 PROCEDURE — 82330 ASSAY OF CALCIUM: CPT

## 2017-08-24 PROCEDURE — 82010 KETONE BODYS QUAN: CPT

## 2017-08-24 PROCEDURE — 94003 VENT MGMT INPAT SUBQ DAY: CPT

## 2017-08-24 PROCEDURE — 80053 COMPREHEN METABOLIC PANEL: CPT

## 2017-08-24 PROCEDURE — 82436 ASSAY OF URINE CHLORIDE: CPT

## 2017-08-24 PROCEDURE — 81001 URINALYSIS AUTO W/SCOPE: CPT

## 2017-08-24 PROCEDURE — 51702 INSERT TEMP BLADDER CATH: CPT | Mod: XU

## 2017-08-24 PROCEDURE — 94002 VENT MGMT INPAT INIT DAY: CPT

## 2017-08-24 PROCEDURE — 99291 CRITICAL CARE FIRST HOUR: CPT | Mod: 25

## 2017-08-24 PROCEDURE — 80048 BASIC METABOLIC PNL TOTAL CA: CPT

## 2017-08-24 PROCEDURE — 87086 URINE CULTURE/COLONY COUNT: CPT

## 2017-08-24 PROCEDURE — 82803 BLOOD GASES ANY COMBINATION: CPT

## 2017-08-24 PROCEDURE — 83605 ASSAY OF LACTIC ACID: CPT

## 2017-08-24 PROCEDURE — 71010: CPT | Mod: 26

## 2017-08-24 PROCEDURE — 85027 COMPLETE CBC AUTOMATED: CPT

## 2017-08-24 PROCEDURE — 85025 COMPLETE CBC W/AUTO DIFF WBC: CPT

## 2017-08-24 PROCEDURE — 82553 CREATINE MB FRACTION: CPT

## 2017-08-24 PROCEDURE — 31500 INSERT EMERGENCY AIRWAY: CPT

## 2017-08-24 PROCEDURE — 99238 HOSP IP/OBS DSCHRG MGMT 30/<: CPT

## 2017-08-24 PROCEDURE — 84132 ASSAY OF SERUM POTASSIUM: CPT

## 2017-08-24 PROCEDURE — 84295 ASSAY OF SERUM SODIUM: CPT

## 2017-08-24 PROCEDURE — 84484 ASSAY OF TROPONIN QUANT: CPT

## 2017-08-24 PROCEDURE — 96374 THER/PROPH/DIAG INJ IV PUSH: CPT | Mod: XU

## 2017-08-24 PROCEDURE — 87186 SC STD MICRODIL/AGAR DIL: CPT

## 2017-08-24 PROCEDURE — 87070 CULTURE OTHR SPECIMN AEROBIC: CPT

## 2017-08-24 PROCEDURE — 85610 PROTHROMBIN TIME: CPT

## 2017-08-24 PROCEDURE — 82550 ASSAY OF CK (CPK): CPT

## 2017-08-24 PROCEDURE — 94640 AIRWAY INHALATION TREATMENT: CPT

## 2017-08-24 PROCEDURE — 84133 ASSAY OF URINE POTASSIUM: CPT

## 2017-08-24 PROCEDURE — 71045 X-RAY EXAM CHEST 1 VIEW: CPT

## 2017-08-24 PROCEDURE — 85730 THROMBOPLASTIN TIME PARTIAL: CPT

## 2017-08-24 PROCEDURE — 83935 ASSAY OF URINE OSMOLALITY: CPT

## 2017-08-24 PROCEDURE — 93005 ELECTROCARDIOGRAM TRACING: CPT | Mod: XU

## 2017-08-24 PROCEDURE — 86850 RBC ANTIBODY SCREEN: CPT

## 2017-08-24 PROCEDURE — 84100 ASSAY OF PHOSPHORUS: CPT

## 2017-08-24 PROCEDURE — 86900 BLOOD TYPING SEROLOGIC ABO: CPT

## 2017-08-24 PROCEDURE — 86901 BLOOD TYPING SEROLOGIC RH(D): CPT

## 2017-08-24 PROCEDURE — 36415 COLL VENOUS BLD VENIPUNCTURE: CPT

## 2017-08-24 RX ORDER — QUETIAPINE FUMARATE 200 MG/1
75 TABLET, FILM COATED ORAL ONCE
Qty: 0 | Refills: 0 | Status: COMPLETED | OUTPATIENT
Start: 2017-08-24 | End: 2017-08-24

## 2017-08-24 RX ADMIN — Medication 3 MILLILITER(S): at 01:45

## 2017-08-24 RX ADMIN — QUETIAPINE FUMARATE 75 MILLIGRAM(S): 200 TABLET, FILM COATED ORAL at 08:32

## 2017-08-24 RX ADMIN — Medication 10 MILLIGRAM(S): at 11:07

## 2017-08-24 RX ADMIN — GABAPENTIN 300 MILLIGRAM(S): 400 CAPSULE ORAL at 05:38

## 2017-08-24 RX ADMIN — Medication 3 MILLILITER(S): at 09:37

## 2017-08-24 RX ADMIN — Medication 20 MILLIGRAM(S): at 05:38

## 2017-08-24 RX ADMIN — Medication 2: at 00:40

## 2017-08-24 RX ADMIN — OXCARBAZEPINE 300 MILLIGRAM(S): 300 TABLET, FILM COATED ORAL at 11:07

## 2017-08-24 RX ADMIN — PANTOPRAZOLE SODIUM 40 MILLIGRAM(S): 20 TABLET, DELAYED RELEASE ORAL at 11:07

## 2017-08-24 RX ADMIN — LEVETIRACETAM 1500 MILLIGRAM(S): 250 TABLET, FILM COATED ORAL at 05:38

## 2017-08-24 RX ADMIN — Medication 3 MILLILITER(S): at 06:12

## 2017-08-24 RX ADMIN — CHLORHEXIDINE GLUCONATE 15 MILLILITER(S): 213 SOLUTION TOPICAL at 05:38

## 2017-08-24 RX ADMIN — MEROPENEM 100 MILLIGRAM(S): 1 INJECTION INTRAVENOUS at 00:40

## 2017-08-24 RX ADMIN — HEPARIN SODIUM 5000 UNIT(S): 5000 INJECTION INTRAVENOUS; SUBCUTANEOUS at 05:38

## 2017-08-24 RX ADMIN — QUETIAPINE FUMARATE 75 MILLIGRAM(S): 200 TABLET, FILM COATED ORAL at 11:17

## 2017-08-24 RX ADMIN — Medication 2: at 11:17

## 2017-08-24 NOTE — PROGRESS NOTE ADULT - SUBJECTIVE AND OBJECTIVE BOX
INTERVAL HPI/OVERNIGHT EVENTS:    VITAL SIGNS:  T(F): 97.8 (08-24-17 @ 01:19)  HR: 69 (08-24-17 @ 08:13)  BP: 115/59 (08-24-17 @ 08:00)  RR: 29 (08-24-17 @ 08:13)  SpO2: 97% (08-24-17 @ 08:13)  Wt(kg): --    PHYSICAL EXAM:    Constitutional: NAD, well-groomed, well-developed  HEENT: PERRLA, EOMI, Normal Hearing, MMM  Neck: No LAD, No JVD  Back: Normal spine flexure, No CVA tenderness  Respiratory: CTAB   Cardiovascular: S1 and S2, RRR, no M/G/R  Gastrointestinal: BS+, soft, NT/ND  Extremities: No peripheral edema  Vascular: 2+ peripheral pulses  Neurological: A/O x 3, no focal deficits  Psychiatric: Normal mood, normal affect  Musculoskeletal: 5/5 strength b/l upper and lower extremities  Skin: No rashes      MEDICATIONS  (STANDING):  atorvastatin 40 milliGRAM(s) Oral at bedtime  busPIRone 10 milliGRAM(s) Oral daily  gabapentin 300 milliGRAM(s) Oral two times a day  ALBUTerol/ipratropium for Nebulization 3 milliLiter(s) Nebulizer every 4 hours  chlorhexidine 0.12% Liquid 15 milliLiter(s) Swish and Spit two times a day  levETIRAcetam  Solution 1500 milliGRAM(s) Oral two times a day  OXcarbazepine Suspension 300 milliGRAM(s) Oral daily  OXcarbazepine Suspension 450 milliGRAM(s) Oral at bedtime  heparin  Injectable 5000 Unit(s) SubCutaneous every 8 hours  meropenem IVPB 1000 milliGRAM(s) IV Intermittent every 12 hours  insulin lispro (HumaLOG) corrective regimen sliding scale   SubCutaneous every 6 hours  dextrose 5%. 1000 milliLiter(s) (50 mL/Hr) IV Continuous <Continuous>  dextrose 50% Injectable 12.5 Gram(s) IV Push once  dextrose 50% Injectable 25 Gram(s) IV Push once  dextrose 50% Injectable 25 Gram(s) IV Push once  metolazone 10 milliGRAM(s) Oral two times a day  QUEtiapine 75 milliGRAM(s) Oral every 8 hours  predniSONE   Tablet 20 milliGRAM(s) Oral daily  pantoprazole   Suspension 40 milliGRAM(s) Enteral Tube daily  insulin glargine Injectable (LANTUS) 14 Unit(s) SubCutaneous at bedtime    MEDICATIONS  (PRN):  dextrose Gel 1 Dose(s) Oral once PRN Blood Glucose LESS THAN 70 milliGRAM(s)/deciliter  glucagon  Injectable 1 milliGRAM(s) IntraMuscular once PRN Glucose LESS THAN 70 milligrams/deciliter  haloperidol     Tablet 1 milliGRAM(s) Oral two times a day PRN agitation      Allergies    No Known Allergies    Intolerances        LABS:                        8.7    6.4   )-----------( 165      ( 24 Aug 2017 06:23 )             27.1     08-24    139  |  99  |  23  ----------------------------<  138<H>  4.0   |  32<H>  |  0.60    Ca    9.1      24 Aug 2017 06:23  Phos  2.9     08-24  Mg     2.2     08-24            RADIOLOGY & ADDITIONAL TESTS:

## 2017-08-24 NOTE — PROGRESS NOTE ADULT - ATTENDING COMMENTS
Patient seen and examined with house-staff during bedside rounds.  Resident note read, including vitals, physical findings, laboratory data, and radiological reports.   Revisions included below.  Direct personal management at bed side and extensive interpretation of the data.  Plan was outlined and discussed in details with the housestaff.  Decision making of high complexity  Respiratory failure bacteremia and pneumonia.  He is to be discharged home. [facility] approved comfort care.  Change to po Bactrioum for another 7 days .  Yemi oijessica steroids as outpatient.  received one dose of haldol.  Discussed with family
Pt remains intubated, sedated, requiring mechanical ventilation and critical care management. Overall clinically improved, vasopressor requirement likely sedation related. Plan for lightening sedation and SBT. Spoke to daughter about overall prognosis and high likelihood of future recurrent respiratory failure.
Patient seen and examined with house-staff during bedside rounds.  Resident note read, including vitals, physical findings, laboratory data, and radiological reports.   Revisions included below.  Direct personal management at bed side and extensive interpretation of the data.  Plan was outlined and discussed in details with the housestaff.  Decision making of high complexity  respiratory failure bacteremia and pneumonia.  I discussed the case extensively with family.  The case was discussed with the Rabbi and he is to go home with comfort care.  Change to po antibiotics tomorrow.  Wean of steroids.  suctioned the patient small amount secretions
Pt seen and examined at the bedside. Chart reviewed, case d/w Housestaff. All imaging and lab data reviewed. I agree with their history, physical, assessment and plan.    Pt remains in acute hypoxemic respiratory failure, intubated on mechanical ventilator, likely due to aspiration, requiring continued critical care management.
Patient seen and examined with house-staff during bedside rounds.  Resident note read, including vitals, physical findings, laboratory data, and radiological reports.   Revisions included below.  Direct personal management at bed side and extensive interpretation of the data.  Plan was outlined and discussed in details with the housestaff.  Decision making of high complexity  I discussed the case in the family. Patient was on responsive this morning.   Dosage of seraquil and haldol were adjusted.  the mental status improved during the day. Continue antibiotic for his pneumonia and bacteremia. Increase diuresis  patient my need tracheostomy if fails weaning trials
Patient seen and examined with house-staff during bedside rounds.  Resident note read, including vitals, physical findings, laboratory data, and radiological reports.   Revisions included below.  Direct personal management at bed side and extensive interpretation of the data.  Plan was outlined and discussed in details with the housestaff.  Decision making of high complexity  I had extensive discussion with the Family regarding extubation Patient hasMental status. He is on able to protect is airways and clearances secretion.  I explained the need for tracheostomy the familypatient has successful spontaneous trial was extubated after the family decision to proceed tracheostomy if fails requires reintubation

## 2017-08-27 DIAGNOSIS — I25.10 ATHEROSCLEROTIC HEART DISEASE OF NATIVE CORONARY ARTERY WITHOUT ANGINA PECTORIS: ICD-10-CM

## 2017-08-27 DIAGNOSIS — E11.00 TYPE 2 DIABETES MELLITUS WITH HYPEROSMOLARITY WITHOUT NONKETOTIC HYPERGLYCEMIC-HYPEROSMOLAR COMA (NKHHC): ICD-10-CM

## 2017-08-27 DIAGNOSIS — B95.2 ENTEROCOCCUS AS THE CAUSE OF DISEASES CLASSIFIED ELSEWHERE: ICD-10-CM

## 2017-08-27 DIAGNOSIS — J96.22 ACUTE AND CHRONIC RESPIRATORY FAILURE WITH HYPERCAPNIA: ICD-10-CM

## 2017-08-27 DIAGNOSIS — G40.909 EPILEPSY, UNSPECIFIED, NOT INTRACTABLE, WITHOUT STATUS EPILEPTICUS: ICD-10-CM

## 2017-08-27 DIAGNOSIS — Z93.1 GASTROSTOMY STATUS: ICD-10-CM

## 2017-08-27 DIAGNOSIS — G30.9 ALZHEIMER'S DISEASE, UNSPECIFIED: ICD-10-CM

## 2017-08-27 DIAGNOSIS — E86.0 DEHYDRATION: ICD-10-CM

## 2017-08-27 DIAGNOSIS — Z96.0 PRESENCE OF UROGENITAL IMPLANTS: ICD-10-CM

## 2017-08-27 DIAGNOSIS — K59.00 CONSTIPATION, UNSPECIFIED: ICD-10-CM

## 2017-08-27 DIAGNOSIS — R65.21 SEVERE SEPSIS WITH SEPTIC SHOCK: ICD-10-CM

## 2017-08-27 DIAGNOSIS — I95.9 HYPOTENSION, UNSPECIFIED: ICD-10-CM

## 2017-08-27 DIAGNOSIS — R60.0 LOCALIZED EDEMA: ICD-10-CM

## 2017-08-27 DIAGNOSIS — R19.7 DIARRHEA, UNSPECIFIED: ICD-10-CM

## 2017-08-27 DIAGNOSIS — D50.9 IRON DEFICIENCY ANEMIA, UNSPECIFIED: ICD-10-CM

## 2017-08-27 DIAGNOSIS — N13.8 OTHER OBSTRUCTIVE AND REFLUX UROPATHY: ICD-10-CM

## 2017-08-27 DIAGNOSIS — E86.1 HYPOVOLEMIA: ICD-10-CM

## 2017-08-27 DIAGNOSIS — J96.01 ACUTE RESPIRATORY FAILURE WITH HYPOXIA: ICD-10-CM

## 2017-08-27 DIAGNOSIS — B96.20 UNSPECIFIED ESCHERICHIA COLI [E. COLI] AS THE CAUSE OF DISEASES CLASSIFIED ELSEWHERE: ICD-10-CM

## 2017-08-27 DIAGNOSIS — B96.5 PSEUDOMONAS (AERUGINOSA) (MALLEI) (PSEUDOMALLEI) AS THE CAUSE OF DISEASES CLASSIFIED ELSEWHERE: ICD-10-CM

## 2017-08-27 DIAGNOSIS — E87.70 FLUID OVERLOAD, UNSPECIFIED: ICD-10-CM

## 2017-08-27 DIAGNOSIS — E78.5 HYPERLIPIDEMIA, UNSPECIFIED: ICD-10-CM

## 2017-08-27 DIAGNOSIS — E87.3 ALKALOSIS: ICD-10-CM

## 2017-08-27 DIAGNOSIS — N39.0 URINARY TRACT INFECTION, SITE NOT SPECIFIED: ICD-10-CM

## 2017-08-27 DIAGNOSIS — F02.80 DEMENTIA IN OTHER DISEASES CLASSIFIED ELSEWHERE, UNSPECIFIED SEVERITY, WITHOUT BEHAVIORAL DISTURBANCE, PSYCHOTIC DISTURBANCE, MOOD DISTURBANCE, AND ANXIETY: ICD-10-CM

## 2017-08-27 DIAGNOSIS — A41.89 OTHER SPECIFIED SEPSIS: ICD-10-CM

## 2017-08-27 DIAGNOSIS — Z87.19 PERSONAL HISTORY OF OTHER DISEASES OF THE DIGESTIVE SYSTEM: ICD-10-CM

## 2017-08-27 DIAGNOSIS — B96.1 KLEBSIELLA PNEUMONIAE [K. PNEUMONIAE] AS THE CAUSE OF DISEASES CLASSIFIED ELSEWHERE: ICD-10-CM

## 2017-08-27 DIAGNOSIS — T88.7XXA UNSPECIFIED ADVERSE EFFECT OF DRUG OR MEDICAMENT, INITIAL ENCOUNTER: ICD-10-CM

## 2017-08-27 DIAGNOSIS — Z87.01 PERSONAL HISTORY OF PNEUMONIA (RECURRENT): ICD-10-CM

## 2017-08-27 DIAGNOSIS — R06.02 SHORTNESS OF BREATH: ICD-10-CM

## 2017-08-27 DIAGNOSIS — H54.8 LEGAL BLINDNESS, AS DEFINED IN USA: ICD-10-CM

## 2017-08-27 DIAGNOSIS — J18.9 PNEUMONIA, UNSPECIFIED ORGANISM: ICD-10-CM

## 2017-08-27 DIAGNOSIS — Z98.890 OTHER SPECIFIED POSTPROCEDURAL STATES: ICD-10-CM

## 2017-08-27 DIAGNOSIS — E87.8 OTHER DISORDERS OF ELECTROLYTE AND FLUID BALANCE, NOT ELSEWHERE CLASSIFIED: ICD-10-CM

## 2017-08-27 DIAGNOSIS — N40.1 BENIGN PROSTATIC HYPERPLASIA WITH LOWER URINARY TRACT SYMPTOMS: ICD-10-CM

## 2017-08-27 DIAGNOSIS — I69.954 HEMIPLEGIA AND HEMIPARESIS FOLLOWING UNSPECIFIED CEREBROVASCULAR DISEASE AFFECTING LEFT NON-DOMINANT SIDE: ICD-10-CM
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
## 2017-08-27 DIAGNOSIS — I51.7 CARDIOMEGALY: ICD-10-CM

## 2017-08-27 DIAGNOSIS — T38.0X5A ADVERSE EFFECT OF GLUCOCORTICOIDS AND SYNTHETIC ANALOGUES, INITIAL ENCOUNTER: ICD-10-CM

## 2017-08-27 DIAGNOSIS — I10 ESSENTIAL (PRIMARY) HYPERTENSION: ICD-10-CM

## 2017-08-27 DIAGNOSIS — R41.82 ALTERED MENTAL STATUS, UNSPECIFIED: ICD-10-CM

## 2017-11-22 ENCOUNTER — EMERGENCY (EMERGENCY)
Facility: HOSPITAL | Age: 82
LOS: 1 days | Discharge: HOME | End: 2017-11-22
Attending: EMERGENCY MEDICINE | Admitting: EMERGENCY MEDICINE
Payer: MEDICARE

## 2017-11-22 VITALS
SYSTOLIC BLOOD PRESSURE: 134 MMHG | OXYGEN SATURATION: 97 % | WEIGHT: 164.91 LBS | TEMPERATURE: 97 F | DIASTOLIC BLOOD PRESSURE: 71 MMHG | HEART RATE: 86 BPM | RESPIRATION RATE: 16 BRPM

## 2017-11-22 VITALS
OXYGEN SATURATION: 97 % | DIASTOLIC BLOOD PRESSURE: 81 MMHG | HEART RATE: 94 BPM | RESPIRATION RATE: 18 BRPM | SYSTOLIC BLOOD PRESSURE: 158 MMHG

## 2017-11-22 DIAGNOSIS — Z79.899 OTHER LONG TERM (CURRENT) DRUG THERAPY: ICD-10-CM

## 2017-11-22 DIAGNOSIS — Z98.890 OTHER SPECIFIED POSTPROCEDURAL STATES: ICD-10-CM

## 2017-11-22 DIAGNOSIS — J95.01 HEMORRHAGE FROM TRACHEOSTOMY STOMA: ICD-10-CM

## 2017-11-22 DIAGNOSIS — I25.10 ATHEROSCLEROTIC HEART DISEASE OF NATIVE CORONARY ARTERY WITHOUT ANGINA PECTORIS: ICD-10-CM

## 2017-11-22 DIAGNOSIS — Z98.89 OTHER SPECIFIED POSTPROCEDURAL STATES: Chronic | ICD-10-CM

## 2017-11-22 DIAGNOSIS — I10 ESSENTIAL (PRIMARY) HYPERTENSION: ICD-10-CM

## 2017-11-22 DIAGNOSIS — Z93.1 GASTROSTOMY STATUS: Chronic | ICD-10-CM

## 2017-11-22 DIAGNOSIS — Z79.52 LONG TERM (CURRENT) USE OF SYSTEMIC STEROIDS: ICD-10-CM

## 2017-11-22 DIAGNOSIS — E78.5 HYPERLIPIDEMIA, UNSPECIFIED: ICD-10-CM

## 2017-11-22 LAB
ALBUMIN SERPL ELPH-MCNC: 2.9 G/DL — LOW (ref 3.3–5)
ALP SERPL-CCNC: 109 U/L — SIGNIFICANT CHANGE UP (ref 40–120)
ALT FLD-CCNC: 40 U/L — SIGNIFICANT CHANGE UP (ref 10–45)
ANION GAP SERPL CALC-SCNC: 12 MMOL/L — SIGNIFICANT CHANGE UP (ref 5–17)
APTT BLD: 28.4 SEC — SIGNIFICANT CHANGE UP (ref 27.5–37.4)
AST SERPL-CCNC: 35 U/L — SIGNIFICANT CHANGE UP (ref 10–40)
BASOPHILS NFR BLD AUTO: 0.1 % — SIGNIFICANT CHANGE UP (ref 0–2)
BILIRUB SERPL-MCNC: 0.3 MG/DL — SIGNIFICANT CHANGE UP (ref 0.2–1.2)
BUN SERPL-MCNC: 26 MG/DL — HIGH (ref 7–23)
CALCIUM SERPL-MCNC: 9.2 MG/DL — SIGNIFICANT CHANGE UP (ref 8.4–10.5)
CHLORIDE SERPL-SCNC: 103 MMOL/L — SIGNIFICANT CHANGE UP (ref 96–108)
CO2 SERPL-SCNC: 31 MMOL/L — SIGNIFICANT CHANGE UP (ref 22–31)
CREAT SERPL-MCNC: 0.5 MG/DL — SIGNIFICANT CHANGE UP (ref 0.5–1.3)
EOSINOPHIL NFR BLD AUTO: 1.4 % — SIGNIFICANT CHANGE UP (ref 0–6)
GLUCOSE SERPL-MCNC: 138 MG/DL — HIGH (ref 70–99)
HCT VFR BLD CALC: 34.6 % — LOW (ref 39–50)
HGB BLD-MCNC: 10.3 G/DL — LOW (ref 13–17)
INR BLD: 1.11 — SIGNIFICANT CHANGE UP (ref 0.88–1.16)
LYMPHOCYTES # BLD AUTO: 20.6 % — SIGNIFICANT CHANGE UP (ref 13–44)
MCHC RBC-ENTMCNC: 28.1 PG — SIGNIFICANT CHANGE UP (ref 27–34)
MCHC RBC-ENTMCNC: 29.8 G/DL — LOW (ref 32–36)
MCV RBC AUTO: 94.5 FL — SIGNIFICANT CHANGE UP (ref 80–100)
MONOCYTES NFR BLD AUTO: 9.9 % — SIGNIFICANT CHANGE UP (ref 2–14)
NEUTROPHILS NFR BLD AUTO: 68 % — SIGNIFICANT CHANGE UP (ref 43–77)
PLATELET # BLD AUTO: 229 K/UL — SIGNIFICANT CHANGE UP (ref 150–400)
POTASSIUM SERPL-MCNC: 3.7 MMOL/L — SIGNIFICANT CHANGE UP (ref 3.5–5.3)
POTASSIUM SERPL-SCNC: 3.7 MMOL/L — SIGNIFICANT CHANGE UP (ref 3.5–5.3)
PROT SERPL-MCNC: 7.3 G/DL — SIGNIFICANT CHANGE UP (ref 6–8.3)
PROTHROM AB SERPL-ACNC: 12.3 SEC — SIGNIFICANT CHANGE UP (ref 9.8–12.7)
RBC # BLD: 3.66 M/UL — LOW (ref 4.2–5.8)
RBC # FLD: 15.1 % — SIGNIFICANT CHANGE UP (ref 10.3–16.9)
SODIUM SERPL-SCNC: 146 MMOL/L — HIGH (ref 135–145)
WBC # BLD: 7 K/UL — SIGNIFICANT CHANGE UP (ref 3.8–10.5)
WBC # FLD AUTO: 7 K/UL — SIGNIFICANT CHANGE UP (ref 3.8–10.5)

## 2017-11-22 PROCEDURE — 85025 COMPLETE CBC W/AUTO DIFF WBC: CPT

## 2017-11-22 PROCEDURE — 85610 PROTHROMBIN TIME: CPT

## 2017-11-22 PROCEDURE — 71010: CPT | Mod: 26

## 2017-11-22 PROCEDURE — 80053 COMPREHEN METABOLIC PANEL: CPT

## 2017-11-22 PROCEDURE — 36415 COLL VENOUS BLD VENIPUNCTURE: CPT

## 2017-11-22 PROCEDURE — 99284 EMERGENCY DEPT VISIT MOD MDM: CPT | Mod: GC

## 2017-11-22 PROCEDURE — 85730 THROMBOPLASTIN TIME PARTIAL: CPT

## 2017-11-22 PROCEDURE — 99285 EMERGENCY DEPT VISIT HI MDM: CPT | Mod: 25

## 2017-11-22 PROCEDURE — 71045 X-RAY EXAM CHEST 1 VIEW: CPT

## 2017-11-22 NOTE — ED ADULT NURSE NOTE - OBJECTIVE STATEMENT
Pt is a 89 year old man brought in from home due to "blood in tracheostomy tubing". Home nurse stated he noted blood in tubing when he first saw him tonight. Pt placed on vent by respiratory therapist, suctioned by RT clear secretions were noted. Small amount of blood is noted on tracheostomy collar. PT is in no distress. Pt is saturating 99% on vent, VS stable, Pt is on cardiac monitor. Pt's family is at bedside.

## 2017-11-22 NOTE — ED PROVIDER NOTE - MEDICAL DECISION MAKING DETAILS
Hgb stable, ENT cauterized slight oozing around stoma. Plan to d/c without outpatient ENT Hgb stable, ENT cauterized slight oozing around stoma. Plan to get CTA neck and to d/c with outpatient ENT f/up if CTA neck is negative.

## 2017-11-22 NOTE — ED ADULT TRIAGE NOTE - CHIEF COMPLAINT QUOTE
(trache to vent dependent) , family noticed  bleeding to trache  tonight; breathing unlabored; per LPN no trauma noted

## 2017-11-22 NOTE — ED PROVIDER NOTE - PROGRESS NOTE DETAILS
CXR improved since last admission, no infiltrates. Awaiting ENT evaluation. ENT evaluated patient, slight oozing around cuff cauterized with silver nitrate, no need for admission, follow up with outside ENT. ENT evaluated patient, slight oozing around cuff cauterized with silver nitrate. ENT requesting CTA of Neck to r/o rare complication of innominate artery bleed- if negative pt can be dcd. This was discussed at length with pt's family who wanted Santiago to be contacted for consultation. Attempts were made by myself and family to contact him with no success. Family made decision not to get CTA and to take pt home. Arrangements were made for transportation.

## 2017-11-22 NOTE — CONSULT NOTE ADULT - SUBJECTIVE AND OBJECTIVE BOX
ENT Consult Note    HPI: This is a 89M with extensive PMH including dementia and stroke s/p trach/PEG at OSH 3 weeks ago in the setting of chronic aspiration presenting this AM to [facility] with blood in trachea. Patient with 8.0 cuffed Shiley trach. Family denies prior blood suctioned from trach. Overnight, noted to have small trach bleeding, approximately 4cc, otherwise venting well. No other complaints. ENT consulted for evaluation.    PE:  NAD  Breathing comfortably on vent  [id number]  8.0 cuffed shiley, cuff up  Trach soft collar loose, not appropriately tightened  Neck soft    Trach removed and tracheoscopy performed at bedside: Small oozing noted from trach stoma, cauterized with silver nitrate, otherwise no active bleeding appreciated    A/P: 89M s/p trach 3 weeks prior, now with small tracheal stoma bleeding, likely 2/2 rubbing of trach against mucosa of stoma, s/p cautery.  - No further acute ENT intervention  - Please ensure trach collar remains appropriately tightened. Should only be able to insert two fingers between trach collar and neck.  - Can expect some intermittent blood tinged secretions. Patient should follow up with outside ENT. If bleeding continues to remain an issue, may need to have trach changed to alternative size  - Neck CTA to rule out tracheo-innominate fistula  - Contact ENT with any questions or concerns

## 2017-11-22 NOTE — ED PROVIDER NOTE - ATTENDING CONTRIBUTION TO CARE
89M PMHx of epilepsy, aspiration pneumonitis s/p PEG placement, CVA with L sided deficits and contractures, HTN, CAD, s/p trach and PEG at [facility] about 3 weeks ago, presents with blood from his tracheostomy. His aide states that the high pressure alarm went off on the vent, and he noted some red/ dark blood in the tubing. No fevers, chills. Pt on vent. abd: soft. Tubing changed by resp on pt arival to ED. Pt placed on vent. (+) scant red blood noted in tubing. Labs noted.  ENT consulted and evaluated patient, slight oozing around cuff cauterized with silver nitrate. ENT requesting CTA of Neck to r/o rare complication of innominate artery bleed- if negative pt can be dcd. This was discussed at length with pt's family who wanted Santiago to be contacted for consultation. Attempts were made by myself and family to contact him with no success. Family made decision not to get CTA and to take pt home. Arrangements were made for transportation.

## 2017-11-22 NOTE — ED PROVIDER NOTE - OBJECTIVE STATEMENT
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 His aid states that the high pressure alarm went off on the vent, and then there was some blood in the tubing. This has not happened to the patient before, and there was nothing out of the 89M PMHx of AD, epilepsy, aspiration pneumonitis s/p PEG placement, CVA with L sided deficits and contractures, HTN, CAD, s/p trach and PEG at [facility] about 3 weeks ago, presents with blood from his tracheostomy. His aid states that the high pressure alarm went off on the vent, and then there was some blood in the tubing. This has not happened to the patient before, and there was nothing out of the ordinary 89M PMHx of AD, epilepsy, aspiration pneumonitis s/p PEG placement, CVA with L sided deficits and contractures, HTN, CAD, s/p trach and PEG at [facility] about 3 weeks ago, presents with blood from his tracheostomy. His aid states that the high pressure alarm went off on the vent, and then there was some blood in the tubing. This has not happened to the patient before, and there was nothing out of the ordinary noticed by the family or aid. No BM yet today, but no black BMs in the past few days. No fever, chills, output from the PEG, or rash.

## 2018-08-08 NOTE — ED PROVIDER NOTE - PRINCIPAL DIAGNOSIS
51456 Exp Problem Focused - Mod. Complex Acute respiratory failure, unspecified whether with hypoxia or hypercapnia

## 2020-08-24 NOTE — ED ADULT TRIAGE NOTE - CHIEF COMPLAINT QUOTE
Pt coming from home w/ fever, increased respiratory effort. Pt managed at home w/ peg tube  antipyretics and IM antibiotics.
No

## 2020-12-28 NOTE — ED PROVIDER NOTE - RESPIRATORY, MLM
Detail Level: Detailed
Quality 431: Preventive Care And Screening: Unhealthy Alcohol Use - Screening: Patient screened for unhealthy alcohol use using a single question and scores less than 2 times per year
Rhonchi throughout. + increased WOB, tachypnea, and hypoxia. O2 sat 90% on NRB

## 2021-01-01 NOTE — PROGRESS NOTE ADULT - PROVIDER SPECIALTY LIST ADULT
Critical Care
MICU
Statement Selected

## 2023-03-01 NOTE — PATIENT PROFILE ADULT. - DIETITIAN.
Spoke with Mulugeta'
dietitian/nutrition services

## 2023-03-11 NOTE — PATIENT PROFILE ADULT. - DOES PATIENT HAVE ADVANCE DIRECTIVE
This patient has been assessed with a concern for Malnutrition and has been determined to have a diagnosis/diagnoses of Moderate protein-calorie malnutrition.    This patient is being managed with:   Diet Low Fiber-  Entered: Mar 10 2023 10:42AM    The following pending diet order is being considered for treatment of Moderate protein-calorie malnutrition:  Diet Full Liquid-  Free Water Flush Instructions:  **OFFER Sugar Free Options for beverages jellos and puddings  Supplement Feeding Modality:  Oral  Ensure Max Cans or Servings Per Day:  1       Frequency:  Two Times a day  Entered: Mar  9 2023 12:26PM   No

## 2023-04-28 NOTE — ED ADULT NURSE NOTE - PT NEEDS ASSIST
04/28/23                            Junior Martinez  [address] [facility] [address]    To Whom It May Concern:    This is to certify Junior Martinez was evaluated with Mey Estrada DO on 04/28/23 and can return to regular work on Monday, May 1, 2023          Electronically signed by:  Mey Estrada DO  [facility]--[address] [place] [address]  Dept Phone: [phone]        no

## 2024-02-07 NOTE — ED ADULT NURSE NOTE - URETHRAL CATH TYPE
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 Yg was accompanied to the session by her mother and siblings. Family remains in lobby/vehicle to encourage child's functional participation and independence.     Behavior(s) observed this date: alert, awake, cooperative, required consistent physical prompts and redirection, poor attention/distractible, unaware of errors and happy.      Objective    PROGRESS REPORT: Yg is demonstrating progress in the following areas: receptive language skills (understanding what is said to her), expressive language skills (communicating their wants and needs to others with gestures, AAC or spoken language), articulation skills (how clearly words are spoken) and phonological awareness skills (ability to recognize and work with sounds in spoken language) since last progress note. Specific data supporting progress listed below in data collection under short term goals. Specifically, therapist has made skilled observations of the following skills:       Speech Goals    Long Term Goals:   1. Child will produce age-appropriate functional expressive/receptive language skills in all settings and contexts.  2. Child will produce age-appropriate spoken language productions w/ all peers and adults in all settings and contexts.        Short Term Goals:   1. Child will  "utilize gestures to enhance functional communication in 4/5 opp w/ min cues over 3 consecutive sessions  *SLP uses signs for \"open\" and \"more\" and \"all done\".  Verbally targeted consonants with a vowel for one syllable, common words. Heavily targeted basic consonants with vowel addition however pt requires max visual and verbal prompts and cues from SLP models. She does verbalize sounds during play today however primarily when prompted by SLP.  She produces sounds in isolation and produces SLP verbal models for imitation for consonant sounds this session as well as vowel sounds. Direct imitation from SLP required however child also w/ inconsistent verbal imitation, however increased verbal attempts. Child w/ increased verbalizations paired w/ AAC usage this session. Uses AAC to id basic concepts, therapy gym items, animals, foods, and colors at 80% acc this session w/ models from SLP Child requests to use AAC and is excited when AAC is nearby. Uses AAC for cause/effect game play exploration. She enjoys matching, cause/effect, and game building stimuli.      2. Child will indicate item desired via verbal approximation in 8/10 opp w/ min cues over 3 consecutive sessions  *max cues for verbal initiations. Concerns for verbal/speech apraxia as pt w/ MAX cues and prompts to attempt imitations of lips/tongue. Max cues and prompts for articulatory skills and pattern productions. Use of sing song letter building apps on AAC        3. Child will identify body parts w/ 80% acc in 4/5 opportunities w/ min cues across 3 consecutive sessions  *pt id's feet when putting on shoes via pointing    4. Child will follow simple age-appropraite 1-step directions in 4/5 opp w/ min cues over 3 consecutive sessions  *SLP uses signs for \"open\" and \"more\" and \"all done\".  Verbally targeted consonants with a vowel for one syllable, common words. Heavily targeted basic consonants with vowel addition however pt requires max visual and verbal " prompts and cues from SLP models. She does verbalize sounds during play today however primarily when prompted by SLP.  She produces sounds in isolation and produces SLP verbal models for imitation for consonant sounds this session as well as vowel sounds. Direct imitation from SLP required however child also w/ inconsistent verbal imitation, however increased verbal attempts. Child w/ increased verbalizations paired w/ AAC usage this session. Uses AAC to id basic concepts, therapy gym items, animals, foods, and colors at 80% acc this session w/ models from SLP Child requests to use AAC and is excited when AAC is nearby. Uses AAC for cause/effect game play exploration. She enjoys matching, cause/effect, and game building stimuli.       5. Child will imitate productions of early developing sounds (/m/ as in “mama”; /b/ as in “baby”; “y” as in “you”; /n/ as in “no”; /w/ as in “we”; /d/ as in “daddy”; /p/ as in “pop”; /h/ as in “hi”) w/ one syllable word models in 4/5 opp of each phoneme w/ min cues over 3 consecutive session  *Verbally targeted consonants with a vowel for one syllable, common words. Heavily targeted basic consonants with vowel addition however pt requires max visual and verbal prompts and cues from SLP models. She does verbalize sounds during play today however primarily when prompted by SLP.  She produces sounds in isolation and produces SLP verbal models for imitation for consonant sounds this session as well as vowel sounds. Direct imitation from SLP required however child also w/ inconsistent verbal imitation, however increased verbal attempts. Child w/ increased verbalizations paired w/ AAC usage this session. Uses AAC to id basic concepts, therapy gym items, animals, foods, and colors at 80% acc this session w/ models from SLP Child requests to use AAC and is excited when AAC is nearby. Uses AAC for cause/effect game play exploration.    6. Child will demonstrate knowledge and understanding of  basic concepts of age appropriate level in 8/10 opp w/ min cues across 3 sessions.  *education on basic concepts from various ADL categories and activities. She requires mod assist for id of items this session w/ tactile based items. Child noted w/ increased processing duration. Use of SnapCore cause/effect games this session. Child w/ increased verbalizations paired w/ AAC usage this session. Uses AAC to id animals, colors, foods, shapes, etc at 80-90% acc this session. Child requests to use AAC and is excited when AAC is nearby. Uses AAC for cause/effect game play exploration. She verbalizes names for colors this session w/ increased intelligibility.                 *additional goals to be addressed as functionally appropriate pending progress toward POC        D/w pt parents goals and results/recommendations. Ideas for generalization such as book reading, playing, meal time routines, singing d/w pt guardian w/ agreement and understanding.          Early screening for diagnosis and treatment will be utilized.         Assessment      Patient is progressing with targeted goals to facilitate increased receptive language skills (understanding what is said to her) and expressive language skills (communicating their wants and needs to others with gestures, AAC or spoken language) to communicate effectively with medical professionals and communication partners in all activities of daily living across all settings.     SLP Diagnosis/Severity: Severe Expressive Language Delay, Mild Receptive Language Delay               Plan of Care     Continue with speech and language therapy to allow for improved independence communicating wants and needs during ADLs per patient's plan of care. Trial of AAC used this session w/ child w/ increased verbal attempts and vocal productions this session. Personal NUVO Grid device has been ordered for patient. Awaiting arrival.         REQUEST FOR 24 visits w/ therapy 2x per week for 12 weeks.             Child received NUVO w/ SnapCore AAC this session. Educated mother on device usage. Sent home device, materials, and paperwork with mother.          Billed Treatment Time     Total Time Calculation: 35 minutes        Planned CPT Codes: Speech/Language 12797 and AAC Treatment 34011              Referring Provider:     Wali Dewitt, Osmar  [address] [place] [address]   NPI: [id number]           Today's Treatment Provided by:    Thank you for allowing me to participate in the care of your patient-      Giuliano Doan M.A.Ed., CCC-SLP, ASD        2/7/2024    Speech-Language Pathologist  [address] [place], [address]  Office [phone] ext. [phone]   Fax [phone]       [place] License Number: [id number]  [facility] Licence Number: [id number]     Electronically Signed                            Indwelling

## 2024-03-04 NOTE — H&P ADULT - NSHPATTENDINGPLANDISCUSS_GEN_ALL_CORE
Requested medication(s) are due for refill today: Yes  Patient has already received a courtesy refill: No  Other reason request has been forwarded to provider:    as above

## 2024-04-18 NOTE — PROVIDER CONTACT NOTE (OTHER) - NAME OF MD/NP/PA/DO NOTIFIED:
Attempted to call report to [address] [place] [address], she asked that I call her back in 5-10 min. [facility]  Report of Consultation    Jonny Haque Patient Status:  Inpatient    4/15/1947 MRN [id number]   Location [facility] 8NE-A Attending No att. providers found   Hosp Day # 1 PCP Gee Jimenez MD     Reason for Consultation:   CP    History of Present Illness:   Jonny Haque is a(n) 77 year old male with ESRD, moderate aortic stenosis, GI bleeding and complex CAD with recent left main PCI and CX/OM PTCA by myself.  He was last discharged from [facility] approximately 2  weeks ago after presenting post PCI with CP and a new, moderate sized pericardial effusion.    Patient notes he had \"side pain\" prior to admission. It was on the left side of his chest.  He had a non-productive cough as well which has been present for approximatley 1 month.  He notes the pain was worst when lying on his left side in bed.  The pain was \"dull\" and did not radiate.  It was present at rest and was non-exertional.  It was 7/10 severity.  It improved when he wasn't coughing.  He had no associated symptoms.  This was not the pain he had a few weeks ago which prompted PCI.  The cough continued last night and his family brought him to the ER.      He is presently comfortable in bed.  The cough and chest pain have improved.  He is currently CP free.  His breathing is comfortable.  No complaints.    Past Medical History:   Past Medical History:    Calculus of kidney    Coronary atherosclerosis    bare metal stents at [facility] 2021    Diabetes (HCC)    Disorder of thyroid    Esophageal varices without bleeding, unspecified esophageal varices type (HCC)    Hepatitis, unspecified    hepatitis C-just completed taking Harvoni in 2016    High blood pressure    Malignant neoplasm of trigone of urinary bladder (HCC)    Malignant neoplasm of urinary bladder, unspecified site (HCC)    Malignant neoplasm of urinary bladder, unspecified site (HCC)    Renal disorder    Visual impairment     Dr. Rich reading glasses       Social History:   Smoking:  None  Alcohol:  None    Family History:   No family history of premature arthrosclerotic heart disease     Medications:   Scheduled:    insulin aspart  1-5 Units Subcutaneous TID CC and HS    heparin  5,000 Units Subcutaneous Q8H ANNEL    amiodarone  200 mg Oral Daily    aspirin  81 mg Oral QOD    clopidogrel  75 mg Oral Daily    insulin degludec  10 Units Subcutaneous Nightly    insulin aspart  10 Units Subcutaneous TID AC    levothyroxine  150 mcg Oral QAM AC    [Held by provider] losartan  50 mg Oral Daily    metoprolol succinate ER  50 mg Oral Daily Beta Blocker    pantoprazole  40 mg Oral BID AC    rosuvastatin  10 mg Oral Nightly    sevelamer carbonate  800 mg Oral TID CC       Continuous Infusion:       PRN Medications:     nitroglycerin    zolpidem    glucose **OR** glucose **OR** glucose-vitamin C **OR** dextrose **OR** glucose **OR** glucose **OR** glucose-vitamin C    acetaminophen    acetaminophen **OR** HYDROcodone-acetaminophen **OR** HYDROcodone-acetaminophen    polyethylene glycol (PEG 3350)    sennosides    bisacodyl    ondansetron    metoclopramide    Outpatient Medications:   Current Facility-Administered Medications on File Prior to Encounter   Medication Dose Route Frequency Provider Last Rate Last Admin    [COMPLETED] epoetin eunice (Epogen, Procrit) 30713 UNIT/ML injection 10,000 Units  10,000 Units Intravenous Once Durga Fernando MD   10,000 Units at 04/04/24 1052    [COMPLETED] epoetin eunice (Epogen, Procrit) 89800 UNIT/ML injection 10,000 Units  10,000 Units Intravenous Once Durga Fernando MD   10,000 Units at 04/02/24 1043    [COMPLETED] heparin (Porcine) 1000 UNIT/ML injection 3,000 Units  3,000 Units Intracatheter Once Durga Fernando MD   3,000 Units at 04/02/24 1130    [COMPLETED] cefTRIAXone (Rocephin) 2 g in D5W 100 mL IVPB-ADD  2 g Intravenous Once William Muñoz MD   Stopped at 03/31/24 1550    [COMPLETED] epoetin eunice (Epogen, Procrit)  26940 UNIT/ML injection 10,000 Units  10,000 Units Intravenous Once Durga Fernando MD   10,000 Units at 24 1407    [COMPLETED] iodixanol (VISIPAQUE) 320 MG/ML injection 100 mL  100 mL Injection ONCE PRN Satya Donald MD   240 mL at 24 1829    [COMPLETED] lidocaine PF (Xylocaine-MPF) 1 % injection             [COMPLETED] heparin (Porcine) 5000 UNIT/ML injection             [COMPLETED] fentaNYL (Sublimaze) 50 mcg/mL injection             [COMPLETED] midazolam (Versed) 2 MG/2ML injection             [COMPLETED] heparin (Porcine) 5000 UNIT/ML injection             [COMPLETED] heparin (Porcine) 1000 UNIT/ML injection             [COMPLETED] protamine 10 mg/mL injection             [] sodium chloride 0.9% infusion   Intravenous Continuous Satya Donald MD 50 mL/hr at 24 1901 New Bag at 24 1901    [COMPLETED] amiodarone (Cordarone) 150 mg in dextrose 5% 100 mL IV bolus  150 mg Intravenous Once Satya Donald  mL/hr at 24 1154 150 mg at 24 1154    [COMPLETED] amiodarone (Cordarone) 150 mg in dextrose 5% 100 mL IV bolus  150 mg Intravenous Once Satya Donald  mL/hr at 24 1505 150 mg at 24 1505    [COMPLETED] Sodium Zirconium Cyclosilicate (Lokelma) oral packet 5 g  5 g Oral Q8H Durga Fernando MD   5 g at 24 0851    [COMPLETED] epoetin eunice (Epogen, Procrit) 22975 UNIT/ML injection 10,000 Units  10,000 Units Intravenous Once Durga Fernando MD   10,000 Units at 24 1035    [COMPLETED] epoetin eunice (Epogen, Procrit) 78853 UNIT/ML injection 10,000 Units  10,000 Units Intravenous Once Durga Fernando MD   10,000 Units at 24 0955    [COMPLETED] lidocaine PF (Xylocaine-MPF) 1 % injection             [COMPLETED] heparin (Porcine) 5000 UNIT/ML injection             [COMPLETED] fentaNYL (Sublimaze) 50 mcg/mL injection             [COMPLETED] midazolam (Versed) 2 MG/2ML injection             [COMPLETED] midazolam (Versed) 2 MG/2ML injection              [COMPLETED] heparin (Porcine) 5000 UNIT/ML injection             [COMPLETED] iodixanol (VISIPAQUE) 320 MG/ML injection 100 mL  100 mL Injection ONCE PRN Satya Donald MD   260 mL at 24 1356    [COMPLETED] DOPamine in dextrose 5% (Intropin) 800 mg/250mL infusion premix             [COMPLETED] norepinephrine (Levophed) 1 mg/mL injection             [COMPLETED] heparin (Porcine) 5000 UNIT/ML injection             [COMPLETED] protamine 10 mg/mL injection             [COMPLETED] clopidogrel (Plavix) 75 MG tab             [] sodium chloride 0.9% infusion   Intravenous Continuous Satya Donald MD 50 mL/hr at 24 1514 New Bag at 24 1514    [COMPLETED] protamine 10 mg/mL injection 20 mg  20 mg Intravenous Once Satya Donald MD   20 mg at 24 1735    [COMPLETED] fentaNYL (Sublimaze) 50 mcg/mL injection             [COMPLETED] midazolam (Versed) 2 MG/2ML injection             [COMPLETED] aspirin chewable tab 324 mg  324 mg Oral Once Bharat Frias DO   324 mg at 24 2327    [COMPLETED] nitroglycerin (Nitrostat) SL tab 0.4 mg  0.4 mg Sublingual Once Bharat Frias DO   0.4 mg at 24 2327    [COMPLETED] iodixanol (VISIPAQUE) 320 MG/ML injection 100 mL  100 mL Injection ONCE PRN Satya Donald MD   90 mL at 24 0102    [COMPLETED] lidocaine PF (Xylocaine-MPF) 1 % injection             [COMPLETED] heparin (Porcine) 5000 UNIT/ML injection             BCG Live 50 mg in sodium chloride 0.9 % 50 mL Intravesicle  50 mg Intravesical Once Siva Lees MD         Current Outpatient Medications on File Prior to Encounter   Medication Sig Dispense Refill    amiodarone 200 MG Oral Tab Take 1 tablet (200 mg total) by mouth daily. 30 tablet 1    clopidogrel 75 MG Oral Tab Take 1 tablet (75 mg total) by mouth daily. 90 tablet 3    losartan 50 MG Oral Tab Take 1 tablet (50 mg total) by mouth daily. 90 tablet 3    metoprolol succinate ER 50 MG Oral Tablet 24 Hr Take 1 tablet (50 mg total) by mouth Daily  Beta Blocker. 90 tablet 3    aspirin 81 MG Oral Tab EC Take 1 tablet (81 mg total) by mouth every other day.  0    levothyroxine 150 MCG Oral Tab Take 1 tablet (150 mcg total) by mouth every other day. Take one tablet every morning before breakfast      Ferric Citrate (AURYXIA) 1  MG(Fe) Oral Tab       pantoprazole 40 MG Oral Tab EC Take 1 tablet (40 mg total) by mouth 2 (two) times daily before meals.      Sevelamer 800 MG Oral Tab Take 1 tablet (800 mg total) by mouth 3 (three) times daily with meals.      nitroGLYCERIN 0.3 MG Sublingual SL Tab Place 1 tablet (0.3 mg total) under the tongue every 5 (five) minutes as needed for Chest pain (as needed for chest pain).      zolpidem 10 MG Oral Tab 1 tablet (10 mg total) nightly as needed.      Insulin Glargine, 2 Unit Dial, (TOUJEO MAX SOLOSTAR) 300 UNIT/ML Subcutaneous Solution Pen-injector Inject 20 Units into the skin nightly. 6 mL 2    rosuvastatin 10 MG Oral Tab Take 1 tablet (10 mg total) by mouth nightly. (Patient taking differently: Take 1 tablet (10 mg total) by mouth nightly.) 90 tablet 0    Insulin Lispro, 1 Unit Dial, (HUMALOG KWIKPEN) 100 UNIT/ML Subcutaneous Solution Pen-injector 12 units before meals with sliding scale. Max daily dose: 50 units. (Patient taking differently: 10 Units. Three times a day. Before meals) 60 mL 3    Insulin Pen Needle (TRUEPLUS PEN NEEDLES) 31G X 5 MM Does not apply Misc Use 5 per day 500 each 2    Glucose Blood (ONETOUCH ULTRA) In Vitro Strip 6 times a day 400 each 3    CONTOUR NEXT TEST In Vitro Strip TEST BLOOD SUGAR FOUR TIMES DAILY 400 strip 3    Blood Glucose Monitoring Suppl (REX CONTOUR NEXT MONITOR) W/DEVICE Does not apply Kit 1 Device by Does not apply route 4 (four) times daily. 1 kit 0       Allergies:   No Known Allergies    Review of Systems:   No fevers, chills, change in weight or bowel habits.  Ten point review of systems is otherwise negative or unremarkable.    Physical Exam:   Vitals:    04/17/24  1645   BP: 146/67   Pulse: 74   Resp: 19   Temp: 98.3 °F (36.8 °C)     Wt Readings from Last 3 Encounters:   04/17/24 210 lb 8.6 oz (95.5 kg)   04/04/24 221 lb 5.5 oz (100.4 kg)   03/24/24 223 lb 1.6 oz (101.2 kg)           General: Well developed, well nourished male.  Pt is in no acute distress.  HEENT:   Normocephalic.  Atraumatic.  Eyes with no scleral icterus.  Neck: Supple.  No JVD.  Carotids 2+ and equal in symmetric fashion.  No bruits are noted.  Cardiac: Regular rate and rhythm.   There is a normal S1 and S2.  No S3 or S4.  No murmurs, rubs, or gallops.  PMI is non-displaced with a normal apical impulse.  Lungs: Clear to ascultation bilaterally.  No focal rales, rhonchi, or wheezes.  Good air movement is noted throughout all lung fields.  Abdomen: Soft.  Non-distended.  Non-tender.  Bowel sounds are present and normoactive.  No guarding or rebound.   Extremities: Extremities do not demonstrate any evidence of peripheral edema.   No cyanosis or clubbing of the digits is appreciated.  Femoral, Dorsalis Pedis, and Posterior Tibialis  pulses are 2+ and equal in a symmetric fashion.  Neurologic: Alert and oriented, normal affect.  No gross deficit appreciated.  Integument:  No visible rashes are appreciated.      Laboratories and Data:   Labs:    Recent Labs   Lab 04/16/24  1730 04/16/24  2323 04/17/24  0436   *  --  155*   BUN 38*  --  48*   CREATSERUM 4.37*  --  5.26*   CA 9.0  --  8.7   ALB 3.2*  --   --      --  138   K 3.5 3.5 3.4*  3.4*   CL 97*  --  100   CO2 31.0  --  29.0   ALKPHO 218*  --   --    AST 26  --   --    ALT 28  --   --    BILT 0.7  --   --    TP 7.5  --   --        Recent Labs   Lab 04/16/24  1730 04/17/24  0436   RBC 3.25* 3.03*   HGB 10.0* 9.6*   HCT 31.0* 28.8*   MCV 95.4 95.0   MCH 30.8 31.7   MCHC 32.3 33.3   RDW 16.4 16.8   NEPRELIM 3.58  --    WBC 5.1 3.8*   .0* 99.0*       Recent Labs   Lab 04/16/24  1730   PTP 14.2   INR 1.10       No results for input(s):  \"TROP\", \"CK\" in the last 168 hours.    Diagnostics:   Tele: SR.  EKG: Sinus/AF IVCD (ALBBB). No major changes from prior tracings.  Echo:   4/2/24  Conclusions:     1. Left ventricle: The cavity size was at the upper limits of normal. Wall      thickness was normal. Systolic function was at the lower limits of      normal. The estimated ejection fraction was 50%, by visual assessment.      Doppler parameters are consistent with abnormal left ventricular      relaxation - grade 1 diastolic dysfunction.   2. Right ventricle: The RV free wall longitudinal strain was -28.20%.   3. Left atrium: The left atrial volume was mildly increased.   4. Aortic valve: Transvalvular velocity was increased. The findings were      consistent with moderate stenosis. The peak systolic velocity was      3.83m/sec. The mean systolic gradient was 37mm Hg. The valve area (VTI)      was 1.34cm^2. The valve area (VTI) index was 0.61cm^2/m^2.   5. Pulmonary arteries: Systolic pressure was at the upper limits of normal,      estimated to be 34mm Hg. The peak systolic pressure is 34mm Hg.   6. Pericardium, extracardiac: A moderate pericardial effusion was      identified. There was evidence of hemodynamic compromise. There is RV      collapse. There was evidence for increased RV-LV interaction demonstrated      by respirophasic changes in transmitral velocities. There were bilateral      pleural effusions present.   Cath: Reviewed.  PCI to ULM and PTCA to OM.         Assessment:  1. CP  2. CAD, recent complex PCI  3. ESRD, HD dependent  4. GIB, with hx cirrhosis  5. AF, off AC given GIB  6. HTN  7. Aortic stenosis, moderate to severe  8. Pericardial effusioin, moderate 4/2024      Plan:  1. CP: Atypical and not c/w myocardial ischemia.  Troponin negative.  Comfortable that he is not infarcting.  Would continue with current medical management for now.  His effusion is significantly decreased in size and there is no hemodynamic effect per   Kwesi.  From CV standpoint he can DC home on baseline meds.  FU with me in 1-2 weeks in clinic.  2. PAF: No AC for now given GIB. Will need to consider watchman.  DAPT for now.   3. Effusion: Improved.  Likely had post MI (initial presentation) pericarditis which is now resolving.  Some of his pain has been pleuritic and may be related to pericardial inflammation.  Echo reassuring.  Will follow clinically for now.  4. HTN: Can switch off Losartan given dry cough and temporal coincidencce with initiation of ARB.  Defer to his HD/nephrology MDs.    Case and plans discussed at length with patient, family and RN.    [facility] MD Satya Donald MD  4/17/2024  9:12 PM

## 2024-05-17 NOTE — H&P ADULT - HISTORY OF PRESENT ILLNESS
89 year old bedbound M w/ PMH of Alzheimer's dementia, HTN, CAD, CVA w/ left sided deficits, GI bleed, aspiration pneumonitis, epilepsy, w/ PEG, presenting to [facility] ED with respiratory distress and altered mental status. At baseline, patient is AAOx3 and prays everyday. Per family (Stephane Turpin), patient was not feeling well for the last few days with increasing respiratory distress and shortness of breath. He was suctioned at home with increased sputum production. They called their PMD who ordered a chest x-ray which showed bilateral infiltrates. Patient did not desaturate or have fevers. At home, peripheral IV lines were placed and his PMD ordered 1g vancomycin, 2 g ceftriaxone, and 250 mg of amikacin. He was also given solumedrol 125 mg and lasix 80 mg. Upon presentation to the ED, T 97.9, HR 92, /83, RR 24, Sat 98%. Patient was found to have increasing respiratory distress, was not able to protect his airway, had stridor and was intubated for airway protection. Per family, patient has an acute respiratory distress episode every 2 weeks, presumed to be stridor from deviated trachea (per family), and is given atropine and solumedrol with relief of symptoms. In the ED, he was given a 1L bolus and started on propofol. (1) Oriented to own ability

## 2024-07-06 NOTE — DISCHARGE NOTE ADULT - CAREGIVER ADDRESS
Patient c/o difficulties swallowing and getting down her eggs this am.  Had made her vomit small amount of food.  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  Patient was unable to take her po medications this am. Dr. Lugo notified of above and orders placed for Barium Esophagram.    [address] [facility] [id number]